# Patient Record
Sex: FEMALE | Race: WHITE | NOT HISPANIC OR LATINO | Employment: STUDENT | URBAN - METROPOLITAN AREA
[De-identification: names, ages, dates, MRNs, and addresses within clinical notes are randomized per-mention and may not be internally consistent; named-entity substitution may affect disease eponyms.]

---

## 2022-11-28 PROBLEM — D68.52 PROTHROMBIN GENE MUTATION (HCC): Status: ACTIVE | Noted: 2022-11-28

## 2023-01-31 PROBLEM — Z31.69 ENCOUNTER FOR PRECONCEPTION CONSULTATION: Status: ACTIVE | Noted: 2023-01-31

## 2023-01-31 NOTE — PROGRESS NOTES
PRECONCEPTION CONSULTATION: MATERNAL-FETAL MEDICINE      Assessment and Plan: Doug Murrell is a 33y o  year-old  here for preconception counseling due to a new diagnosis of prothrombin gene heterozygosity    Problem List Items Addressed This Visit        Other    Prothrombin gene mutation ([address] ) - Primary    Encounter for preconception consultation     1  Prothrombin gene mutation: This was diagnosed because her mother had blood clots recently diagnosed and was found to have low AT3 and was a carrier for prothrombin gene mutation  Mariela Karimi was tested for prothrombin and was found to be a heterozygous carrier  We reviewed that the prothrombin [id number] mutation results in elevated circulating prothrombin levels  This is present in 3% of  populations  A heterozygous carrier, such as Doug Murrell, has a <1% risk of VTE during pregnancy  Having a personal history of VTE would increase this risk to ~10%  With a history of an affected first degree relative (Maxwell's mom), this risk is only slightly increased from the baseline <1% risk in mutation carriers  A combination of this mutation with factor V Leiden could increase the risk even more  Though having both mutations is rare, we will test her for this and AT3 given her moms lab results  If it is positive, she would benefit from anticoagulation in a future pregnancy  If she is negative, then she would not require anticoagulation during a future pregnancy, however it would be recommended for 6 weeks postpartum given her first degree relative with a positive family history  While it can be considered during pregnancy, given the low risk and no additional risk factors, I would not strongly recommend this  If she developed additional risk factors during a pregnancy for VTE (prolonged hospitalization, surgery, preeclampsia, or other), then I would initiate antepartum anticoagulation   Postpartum she should be on prophylactic lovenox 40 mg once a day for 6 weeks     2  General Preconception Counseling: We reviewed the typical course of prenatal care and opportunities to improve health prior to pregnancy including tobacco and marijuana cessation, a healthy diet and exercise  We discussed micronutrients that will improve nutritional stores during pregnancy (including calcium supplementation 7469 mg daily, folic acid 631 mcg daily, choline 450 mg daily, and omega-3 fatty acids (DHA & EPA)) 1000 mg daily, as well as the importance of prenatal vitamins     - A healthy diet and exercise were encouraged as the goal is a healthy weight before pregnancy  We reviewed that for most women this would be ~20 min of moderate activity everyday  - Laboratory testing can be completed prior to pregnancy to evaluation for immunity status (rubella and varicella) and immunizations updated prior to pregnancy  - Carrier Screening: We briefly discussed carrier screening  If not already offered through your office, we would be happy to see her back for carrier screening discussion and testing as desired  3  Adderall use: We reviewed that a lot of data that was concerning for pregnancy was based off of using more dextroamphetamine than prescribed  Using prescribed amounts has not been as well studied  When used for medical reasons it overall does not appear to increase the risk of birth defects  Some older studies are small effect on birthweight amongst people continue to use the past 28 weeks but this was to control weight gain and other studies looking at it to treat ADD as she has not found it effective  Misuse of amphetamines or using more than prescribed is associated with poor growth low birthweight and  delivery  However the studies may have had other risk factors  There can be symptoms of withdrawal in babies after exposure to amphetamines however it is not known if this extends to prescribed amounts of dextroamphetamine    There are no studies looking at long-term behavior  If she is able to be well controlled off of this medication this would be another medication that I would generally recommend discontinuing given potential risks  References: PMID: [id number] PMID: [id number]  It is her intention to discontinue this medication in the case of future pregnancy  Thank you for referring this patient for preconception consultation  Please do not hesitate to reach out with any additional questions or concerns  Unique Bocanegra MD  Attending Physician, Prema      Referring physician:   Monica Gallego, [address],  [address] [facility]    Reason for consultation: No chief complaint on file  Dear Dr Tate Nguyen,    Thank you for referring patient Nilda Hui for preconception Maternal-Fetal Medicine consultation regarding prothrombin gene heterozygosity  As you know, Ms Moon Dubon is a 33y o  year-old   Her history is as follows:    Past Medical History:   Diagnosis Date   • ADHD    • Asthma    • Fibroadenoma of breast, left    • Prothrombin gene mutation ([address] )        Past Surgical History:   Procedure Laterality Date   • WISDOM TOOTH EXTRACTION         OB History    Para Term  AB Living   0 0 0 0 0 0   SAB IAB Ectopic Multiple Live Births   0 0 0 0 0   Obstetric Comments   Menarche age 9   bcp-8yrs       Gynecologic history: No LMP recorded        Social History     Tobacco Use   • Smoking status: Never   • Smokeless tobacco: Never   Vaping Use   • Vaping Use: Never used   Substance Use Topics   • Alcohol use: Yes     Comment: occasional   • Drug use: Never       Family History   Problem Relation Age of Onset   • Thyroid disease Mother    • Breast cancer Other 61       Family history per our office screening tool includes mother with DVTs but is negative for Ashkenazi Methodist ancestry, birth defects, diabetes, early-onset breast ovarian or colon cancer; Down syndrome or other chromosome problem, genetic condition or carrier state for cystic fibrosis, sickle cell, thalassemia, Raphael-Sachs, or spinal muscular atrophy; hemophilia or von Willebrand's disease; preeclampsia or hypertension, or opiate use disorder/addiction or overdose  Current Outpatient Medications:   •  amphetamine-dextroamphetamine (ADDERALL XR) 25 MG 24 hr capsule, Take 1 capsule (25 mg total) by mouth every morning Max Daily Amount: 25 mg, Disp: 30 capsule, Rfl: 0    Allergies   Allergen Reactions   • Shellfish-Derived Products - Food Allergy Swelling       Occupation: She works as an RN   Spouse/Partner Name: Adelia Magana; Age 34; occupation: [employer]  Review of Systems   Constitutional: Negative for chills, fever and unexpected weight change  HENT: Negative for congestion, dental problem, facial swelling and sore throat  Eyes: Negative for visual disturbance  Respiratory: Negative for cough and shortness of breath  Cardiovascular: Negative for chest pain and palpitations  Gastrointestinal: Negative for diarrhea and vomiting  Endocrine: Negative for polydipsia  Genitourinary: Negative for dysuria and vaginal bleeding  Musculoskeletal: Negative for back pain and joint swelling  Skin: Negative for rash and wound  Allergic/Immunologic: Negative for immunocompromised state  Neurological: Negative for seizures and headaches  Hematological: Does not bruise/bleed easily  Psychiatric/Behavioral: Negative for hallucinations and suicidal ideas  Vitals: not currently breastfeeding  Physical Exam  Constitutional:       General: She is not in acute distress  Appearance: Normal appearance  She is not ill-appearing, toxic-appearing or diaphoretic  HENT:      Head: Normocephalic and atraumatic  Nose: No congestion or rhinorrhea  Eyes:      General: No scleral icterus  Right eye: No discharge  Left eye: No discharge        Extraocular Movements: Extraocular movements intact  Conjunctiva/sclera: Conjunctivae normal    Pulmonary:      Effort: Pulmonary effort is normal  No respiratory distress  Musculoskeletal:      Cervical back: Normal range of motion  Skin:     Coloration: Skin is not jaundiced or pale  Findings: No erythema, lesion or rash  Neurological:      General: No focal deficit present  Mental Status: She is alert and oriented to person, place, and time  Psychiatric:         Mood and Affect: Mood normal          Behavior: Behavior normal         -------------------------    The total time spent on this new patient on the encounter date was 45 minutes  This time included previsit service time of  15 minutes reviewing records and precharting, face-to-face (via virtual platform) service time of  20 minutes counseling regarding results and coordinating care, and post-service time of  10 minutes charting  At the conclusion of today's encounter, all questions were answered to her satisfaction  Thank you very much for this kind referral and please do not hesitate to contact me with any further questions or concerns      Sincerely,    Lauren Perry MD  Attending Physician, Pre[facility]    I spent 20 minutes directly with the patient during this visit

## 2023-02-03 ENCOUNTER — CONSULT (OUTPATIENT)
Dept: PERINATAL CARE | Facility: CLINIC | Age: 28
End: 2023-02-03

## 2023-02-03 VITALS
WEIGHT: 167.8 LBS | HEART RATE: 81 BPM | DIASTOLIC BLOOD PRESSURE: 60 MMHG | BODY MASS INDEX: 26.34 KG/M2 | HEIGHT: 67 IN | SYSTOLIC BLOOD PRESSURE: 120 MMHG

## 2023-02-03 DIAGNOSIS — Z82.49 FAMILY HISTORY OF THROMBOSIS IN FIRST DEGREE RELATIVE: ICD-10-CM

## 2023-02-03 DIAGNOSIS — Z31.69 ENCOUNTER FOR PRECONCEPTION CONSULTATION: ICD-10-CM

## 2023-02-03 DIAGNOSIS — D68.52 PROTHROMBIN GENE MUTATION (HCC): Primary | ICD-10-CM

## 2023-02-06 ENCOUNTER — APPOINTMENT (OUTPATIENT)
Dept: LAB | Facility: CLINIC | Age: 28
End: 2023-02-06

## 2023-02-06 DIAGNOSIS — D68.52 PROTHROMBIN GENE MUTATION (HCC): ICD-10-CM

## 2023-02-06 DIAGNOSIS — Z82.49 FAMILY HISTORY OF THROMBOSIS IN FIRST DEGREE RELATIVE: ICD-10-CM

## 2023-02-07 LAB — DEPRECATED AT III PPP: 93 % OF NORMAL (ref 92–136)

## 2023-02-10 LAB — F5 GENE MUT ANL BLD/T: NORMAL

## 2023-02-20 DIAGNOSIS — F90.1 ATTENTION DEFICIT HYPERACTIVITY DISORDER (ADHD), PREDOMINANTLY HYPERACTIVE TYPE: ICD-10-CM

## 2023-02-21 RX ORDER — DEXTROAMPHETAMINE SACCHARATE, AMPHETAMINE ASPARTATE MONOHYDRATE, DEXTROAMPHETAMINE SULFATE AND AMPHETAMINE SULFATE 6.25; 6.25; 6.25; 6.25 MG/1; MG/1; MG/1; MG/1
25 CAPSULE, EXTENDED RELEASE ORAL EVERY MORNING
Qty: 30 CAPSULE | Refills: 0 | Status: SHIPPED | OUTPATIENT
Start: 2023-02-21

## 2023-02-27 ENCOUNTER — TELEPHONE (OUTPATIENT)
Facility: HOSPITAL | Age: 28
End: 2023-02-27

## 2023-02-27 NOTE — TELEPHONE ENCOUNTER
PT called office requesting to discuss blood work she received from her mother that was positive  She states she wants to reach out and discuss this with Dr Jak Almendarez  Pt informed that I would send a message to her and Dr Jak Almendarez will call her to discuss  PTs phone number in Epic confirmed  PT receptive to information and declines questions at this time

## 2023-02-27 NOTE — TELEPHONE ENCOUNTER
I spoke with Ena Borden about her moms recent + test for lupus anticoagulant  Due to this result her mom got diagnosed with antiphospholipid antibody syndrome (APLS) in addition to her diagnosis of prothrombin gene mutation  We reviewed that APLS is an acquired and not an inherited thrombophilia  Since she has no personal history of blood clots and no other clinical history that is seen with APLS I would not recommend additional thrombophilia workup at this time  Ena Borden has known + Prothrombin gene heterozygosity  Ena Borden had directed testing based on her moms genetic mutation  I added Factor V Leiden testing as these two mutations together though rare can change pregnancy management, as well as AT3 since her mom had borderline values in her results  At this point I do not recommend additional work up as per my full preconception consult note    Unique Bocanegra MD  Attending Physician, Prema

## 2023-03-09 ENCOUNTER — TELEPHONE (OUTPATIENT)
Dept: FAMILY MEDICINE CLINIC | Facility: CLINIC | Age: 28
End: 2023-03-09

## 2023-03-15 ENCOUNTER — TELEPHONE (OUTPATIENT)
Dept: OBGYN CLINIC | Facility: MEDICAL CENTER | Age: 28
End: 2023-03-15

## 2023-03-15 DIAGNOSIS — N92.6 MISSED MENSES: Primary | ICD-10-CM

## 2023-03-19 ENCOUNTER — NURSE TRIAGE (OUTPATIENT)
Dept: OTHER | Facility: OTHER | Age: 28
End: 2023-03-19

## 2023-03-19 ENCOUNTER — APPOINTMENT (EMERGENCY)
Dept: ULTRASOUND IMAGING | Facility: HOSPITAL | Age: 28
End: 2023-03-19

## 2023-03-19 ENCOUNTER — HOSPITAL ENCOUNTER (EMERGENCY)
Facility: HOSPITAL | Age: 28
Discharge: HOME | End: 2023-03-20
Attending: EMERGENCY MEDICINE

## 2023-03-19 VITALS
HEART RATE: 81 BPM | SYSTOLIC BLOOD PRESSURE: 115 MMHG | RESPIRATION RATE: 18 BRPM | OXYGEN SATURATION: 100 % | BODY MASS INDEX: 27.1 KG/M2 | TEMPERATURE: 98.3 F | WEIGHT: 171.74 LBS | DIASTOLIC BLOOD PRESSURE: 69 MMHG

## 2023-03-19 DIAGNOSIS — O20.0 THREATENED MISCARRIAGE IN EARLY PREGNANCY: Primary | ICD-10-CM

## 2023-03-19 LAB
ABO GROUP BLD: NORMAL
ANION GAP SERPL CALCULATED.3IONS-SCNC: 7 MMOL/L (ref 4–13)
B-HCG SERPL-ACNC: 14 MIU/ML (ref 0–11.6)
BACTERIA UR QL AUTO: ABNORMAL /HPF
BASOPHILS # BLD AUTO: 0.03 THOUSANDS/ÂΜL (ref 0–0.1)
BASOPHILS NFR BLD AUTO: 0 % (ref 0–1)
BILIRUB UR QL STRIP: NEGATIVE
BUN SERPL-MCNC: 17 MG/DL (ref 5–25)
CALCIUM SERPL-MCNC: 9.2 MG/DL (ref 8.4–10.2)
CHLORIDE SERPL-SCNC: 104 MMOL/L (ref 96–108)
CLARITY UR: CLEAR
CO2 SERPL-SCNC: 26 MMOL/L (ref 21–32)
COLOR UR: YELLOW
CREAT SERPL-MCNC: 0.82 MG/DL (ref 0.6–1.3)
EOSINOPHIL # BLD AUTO: 0.05 THOUSAND/ÂΜL (ref 0–0.61)
EOSINOPHIL NFR BLD AUTO: 0 % (ref 0–6)
ERYTHROCYTE [DISTWIDTH] IN BLOOD BY AUTOMATED COUNT: 12.8 % (ref 11.6–15.1)
EXT PREGNANCY TEST URINE: NEGATIVE
EXT. CONTROL: NORMAL
GFR SERPL CREATININE-BSD FRML MDRD: 99 ML/MIN/1.73SQ M
GLUCOSE SERPL-MCNC: 96 MG/DL (ref 65–140)
GLUCOSE UR STRIP-MCNC: NEGATIVE MG/DL
HCT VFR BLD AUTO: 41.4 % (ref 34.8–46.1)
HGB BLD-MCNC: 13.6 G/DL (ref 11.5–15.4)
HGB UR QL STRIP.AUTO: ABNORMAL
IMM GRANULOCYTES # BLD AUTO: 0.04 THOUSAND/UL (ref 0–0.2)
IMM GRANULOCYTES NFR BLD AUTO: 0 % (ref 0–2)
KETONES UR STRIP-MCNC: NEGATIVE MG/DL
LEUKOCYTE ESTERASE UR QL STRIP: NEGATIVE
LYMPHOCYTES # BLD AUTO: 1.82 THOUSANDS/ÂΜL (ref 0.6–4.47)
LYMPHOCYTES NFR BLD AUTO: 14 % (ref 14–44)
MCH RBC QN AUTO: 29.2 PG (ref 26.8–34.3)
MCHC RBC AUTO-ENTMCNC: 32.9 G/DL (ref 31.4–37.4)
MCV RBC AUTO: 89 FL (ref 82–98)
MONOCYTES # BLD AUTO: 0.62 THOUSAND/ÂΜL (ref 0.17–1.22)
MONOCYTES NFR BLD AUTO: 5 % (ref 4–12)
NEUTROPHILS # BLD AUTO: 10.12 THOUSANDS/ÂΜL (ref 1.85–7.62)
NEUTS SEG NFR BLD AUTO: 81 % (ref 43–75)
NITRITE UR QL STRIP: NEGATIVE
NON-SQ EPI CELLS URNS QL MICRO: ABNORMAL /HPF
NRBC BLD AUTO-RTO: 0 /100 WBCS
PH UR STRIP.AUTO: 5.5 [PH] (ref 4.5–8)
PLATELET # BLD AUTO: 257 THOUSANDS/UL (ref 149–390)
PMV BLD AUTO: 8.7 FL (ref 8.9–12.7)
POTASSIUM SERPL-SCNC: 3.8 MMOL/L (ref 3.5–5.3)
PROT UR STRIP-MCNC: ABNORMAL MG/DL
RBC # BLD AUTO: 4.66 MILLION/UL (ref 3.81–5.12)
RBC #/AREA URNS AUTO: ABNORMAL /HPF
RH BLD: POSITIVE
SODIUM SERPL-SCNC: 137 MMOL/L (ref 135–147)
SP GR UR STRIP.AUTO: >=1.03 (ref 1–1.03)
UROBILINOGEN UR QL STRIP.AUTO: 0.2 E.U./DL
WBC # BLD AUTO: 12.68 THOUSAND/UL (ref 4.31–10.16)
WBC #/AREA URNS AUTO: ABNORMAL /HPF

## 2023-03-19 NOTE — TELEPHONE ENCOUNTER
Regardin Weeks Pregnant, Now Moderate Bleeding with Clots, Cramping  ----- Message from Armida Johnson sent at 3/19/2023  5:33 PM EDT -----  " I am 6 weeks Pregnant, I called earlier and was told to call back if symptoms got worse  My bleeding got heavier, It's now moderate with some clots   The blood is mixed with my urine stream  I am having some Cramps along with it  "

## 2023-03-19 NOTE — TELEPHONE ENCOUNTER
Reason for Disposition  • SPOTTING (single or brief episode)    Answer Assessment - Initial Assessment Questions  1  ONSET: "When did this bleeding start?"        today  2  DESCRIPTION: "Describe the bleeding that you are having " "How much bleeding is there?"     - SPOTTING: spotting, or pinkish / brownish mucous discharge; does not fill panti-liner or pad     - MILD:  less than 1 pad / hour; less than patient's usual menstrual bleeding    - MODERATE: 1-2 pads / hour; 1 menstrual cup every 6 hours; small-medium blood clots (e g , pea, grape, small coin)    - SEVERE: soaking 2 or more pads/hour for 2 or more hours; 1 menstrual cup every 2 hours; bleeding not contained by pads or continuous red blood from vagina; large blood clots (e g , golf ball, large coin)       Spotting does not need a pad   3  ABDOMINAL PAIN SEVERITY: If present, ask: "How bad is it?"  (e g , Scale 1-10; mild, moderate, or severe)    - MILD (1-3): doesn't interfere with normal activities, abdomen soft and not tender to touch     - MODERATE (4-7): interferes with normal activities or awakens from sleep, tender to touch     - SEVERE (8-10): excruciating pain, doubled over, unable to do any normal activities      3/10   4   PREGNANCY: "Do you know how many weeks or months pregnant you are?" "When was the first day of your last normal menstrual period?"      Unsure last period 2/7    Protocols used: PREGNANCY - VAGINAL BLEEDING LESS THAN 20 WEEKS EGA-ADULT-AH

## 2023-03-19 NOTE — TELEPHONE ENCOUNTER
Reason for Disposition  • MILD vaginal bleeding (i e , less than 1 pad / hour; less than patient's usual menstrual bleeding; not just spotting)    Answer Assessment - Initial Assessment Questions  1  ONSET: "When did this bleeding start?"        Today this morning  2  DESCRIPTION: "Describe the bleeding that you are having " "How much bleeding is there?"     - SPOTTING: spotting, or pinkish / brownish mucous discharge; does not fill panti-liner or pad     - MILD:  less than 1 pad / hour; less than patient's usual menstrual bleeding    - MODERATE: 1-2 pads / hour; 1 menstrual cup every 6 hours; small-medium blood clots (e g , pea, grape, small coin)    - SEVERE: soaking 2 or more pads/hour for 2 or more hours; 1 menstrual cup every 2 hours; bleeding not contained by pads or continuous red blood from vagina; large blood clots (e g , golf ball, large coin)       Mild- noticed some red blood in the toilet  Some small clots  Just started wearing a pad now- is not soaking through pad  Started with spotting earlier today  3  ABDOMINAL PAIN SEVERITY: If present, ask: "How bad is it?"  (e g , Scale 1-10; mild, moderate, or severe)    - MILD (1-3): doesn't interfere with normal activities, abdomen soft and not tender to touch     - MODERATE (4-7): interferes with normal activities or awakens from sleep, tender to touch     - SEVERE (8-10): excruciating pain, doubled over, unable to do any normal activities      3/10- feels period cramp    4  PREGNANCY: "Do you know how many weeks or months pregnant you are?" "When was the first day of your last normal menstrual period?"      Estimated to be about 6 weeks gestation  2/7 was last menstrual period  5  HEMODYNAMIC STATUS: "Are you weak or feeling lightheaded?" If Yes, ask: "Can you stand and walk normally?"       No lightheaded or dizziness       6  OTHER SYMPTOMS: "What other symptoms are you having with the bleeding?" (e g , passed tissue, vaginal discharge, fever, menstrual-type cramps)      No tissue like substance  NO fever  No other symptoms      Protocols used: PREGNANCY - VAGINAL BLEEDING LESS THAN 20 WEEKS EGA-ADULT-AH

## 2023-03-19 NOTE — TELEPHONE ENCOUNTER
Regarding: Light bleeding/Cramps  ----- Message from Ant Gallegos sent at 3/19/2023  4:21 PM EDT -----  "I have light bleeding with some cramps"

## 2023-03-19 NOTE — Clinical Note
Joel Watts was seen and treated in our emergency department on 3/19/2023  Diagnosis:     Tierra Loev  may return to work on return date  She may return on this date: 03/22/2023         If you have any questions or concerns, please don't hesitate to call        Elvia Hua MD    ______________________________           _______________          _______________  Hospital Representative                              Date                                Time

## 2023-03-20 NOTE — ED NOTES
Patient returned from [address] [place] [address] [address]       Marylou Conde RN  03/19/23 [id number]

## 2023-03-20 NOTE — ED PROVIDER NOTES
History  Chief Complaint   Patient presents with   • Vaginal Bleeding     Pt c/o vaginal bleeding and cramping that has gotten  worse during the day  Pt states she is x 6 weeks pregnant  Pt denies cp/sob/n/v/d or fevers     25-year-old female presented for evaluation of pelvic pain and vaginal bleeding  She reports that she estimates she is about 6 weeks pregnant based on her last menstrual cycle  She first had a positive pregnancy test about 2 weeks ago  She has been in contact with an OB/GYN but has not seen them in the office yet and has not yet had an ultrasound  Starting last evening she began to have some vaginal spotting that was relatively mild  Starting this morning she had heavier bleeding associated with abdominal/pelvic cramping similar to menstrual cycle  Throughout the day her symptoms worsened, the pain became more intense, and bleeding became heavier  This evening while she was in the shower she noted very heavy bleeding and passage of clots  She denies any chest pain, shortness of breath, presyncopal symptoms  History provided by:  Patient   used: No    Vaginal Bleeding  Quality:  Dark red, spotting, heavier than menses and clots  Severity:  Moderate  Onset quality:  Gradual  Duration:  1 day  Timing:  Constant  Progression:  Improving  Chronicity:  New  Menstrual history:  Regular and missed period  Possible pregnancy: yes    Relieved by:  Nothing  Worsened by:  Nothing  Ineffective treatments:  None tried  Associated symptoms: abdominal pain    Associated symptoms: no fatigue, no fever and no nausea        Prior to Admission Medications   Prescriptions Last Dose Informant Patient Reported? Taking?    amphetamine-dextroamphetamine (ADDERALL XR) 25 MG 24 hr capsule   No No   Sig: Take 1 capsule (25 mg total) by mouth every morning Max Daily Amount: 25 mg      Facility-Administered Medications: None       Past Medical History:   Diagnosis Date   • ADHD    • Asthma    • Fibroadenoma of breast, left    • Prothrombin gene mutation [facility])        Past Surgical History:   Procedure Laterality Date   • WISDOM TOOTH EXTRACTION         Family History   Problem Relation Age of Onset   • Thyroid disease Mother    • Breast cancer Other 61     I have reviewed and agree with the history as documented  E-Cigarette/Vaping   • E-Cigarette Use Never User      E-Cigarette/Vaping Substances   • Nicotine No    • THC No    • CBD No    • Flavoring No      Social History     Tobacco Use   • Smoking status: Never   • Smokeless tobacco: Never   Vaping Use   • Vaping Use: Never used   Substance Use Topics   • Alcohol use: Not Currently     Comment: occasional   • Drug use: Never       Review of Systems   Constitutional: Negative for fatigue and fever  Respiratory: Negative for chest tightness and shortness of breath  Gastrointestinal: Positive for abdominal pain  Negative for nausea and vomiting  Genitourinary: Positive for pelvic pain and vaginal bleeding  Neurological: Negative for syncope, weakness and numbness  Physical Exam  Physical Exam  Vitals and nursing note reviewed  Constitutional:       General: She is not in acute distress  Appearance: She is well-developed  HENT:      Head: Normocephalic and atraumatic  Mouth/Throat:      Mouth: Mucous membranes are moist       Pharynx: Oropharynx is clear  Eyes:      Conjunctiva/sclera: Conjunctivae normal    Cardiovascular:      Rate and Rhythm: Normal rate and regular rhythm  Heart sounds: No murmur heard  Pulmonary:      Effort: Pulmonary effort is normal  No respiratory distress  Breath sounds: Normal breath sounds  Abdominal:      Palpations: Abdomen is soft  Tenderness: There is no abdominal tenderness  Comments: Could not identify IUP on bedside transabdominal US  Musculoskeletal:         General: No swelling  Cervical back: Neck supple  Skin:     General: Skin is warm and dry  Capillary Refill: Capillary refill takes less than 2 seconds  Neurological:      General: No focal deficit present  Mental Status: She is alert and oriented to person, place, and time  Motor: No weakness  Gait: Gait normal    Psychiatric:         Mood and Affect: Mood normal          Vital Signs  ED Triage Vitals [03/19/23 2032]   Temperature Pulse Respirations Blood Pressure SpO2   98 3 °F (36 8 °C) 83 18 130/68 100 %      Temp Source Heart Rate Source Patient Position - Orthostatic VS BP Location FiO2 (%)   Oral Monitor Sitting Right arm --      Pain Score       6           Vitals:    03/19/23 2032 03/19/23 2304   BP: 130/68 115/69   Pulse: 83 81   Patient Position - Orthostatic VS: Sitting Lying         Visual Acuity      ED Medications  Medications - No data to display    Diagnostic Studies  Results Reviewed     Procedure Component Value Units Date/Time    Urine culture [[id number]] Collected: 03/19/23 2134    Lab Status:  In process Specimen: Urine, Clean Catch Updated: 03/19/23 2223    hCG, quantitative [[id number]]  (Abnormal) Collected: 03/19/23 2132    Lab Status: Final result Specimen: Blood from Arm, Left Updated: 03/19/23 2206     HCG, Quant 14 mIU/mL     Narrative:       Expected Ranges:     Approximate               Approximate HCG  Gestation age          Concentration ( mIU/mL)  _____________          ______________________   Aric Nurse                      HCG values  0 2-1                       5-50  1-2                           2-3                         100-5000  3-4                         500-58273  4-5                         1000-72839  5-6                         [id number]  6-8                         [id number]  8-12                        [id number]      Urine Microscopic [[id number]]  (Abnormal) Collected: 03/19/23 2134    Lab Status: Final result Specimen: Urine, Clean Catch Updated: 03/19/23 2203     RBC, UA Innumerable /hpf      WBC, UA 4-10 /hpf      Epithelial Cells None Seen /hpf      Bacteria, UA Occasional /hpf     Basic metabolic panel [[id number]] Collected: 03/19/23 2132    Lab Status: Final result Specimen: Blood from Arm, Left Updated: 03/19/23 2157     Sodium 137 mmol/L      Potassium 3 8 mmol/L      Chloride 104 mmol/L      CO2 26 mmol/L      ANION GAP 7 mmol/L      BUN 17 mg/dL      Creatinine 0 82 mg/dL      Glucose 96 mg/dL      Calcium 9 2 mg/dL      eGFR 99 ml/min/1 73sq m     Narrative:      [place] guidelines for Chronic Kidney Disease (CKD):   •  Stage 1 with normal or high GFR (GFR > 90 mL/min/1 73 square meters)  •  Stage 2 Mild CKD (GFR = 60-89 mL/min/1 73 square meters)  •  Stage 3A Moderate CKD (GFR = 45-59 mL/min/1 73 square meters)  •  Stage 3B Moderate CKD (GFR = 30-44 mL/min/1 73 square meters)  •  Stage 4 Severe CKD (GFR = 15-29 mL/min/1 73 square meters)  •  Stage 5 End Stage CKD (GFR <15 mL/min/1 73 square meters)  Note: GFR calculation is accurate only with a steady state creatinine    CBC and differential [[id number]]  (Abnormal) Collected: 03/19/23 2132    Lab Status: Final result Specimen: Blood from Arm, Left Updated: 03/19/23 2139     WBC 12 68 Thousand/uL      RBC 4 66 Million/uL      Hemoglobin 13 6 g/dL      Hematocrit 41 4 %      MCV 89 fL      MCH 29 2 pg      MCHC 32 9 g/dL      RDW 12 8 %      MPV 8 7 fL      Platelets 213 Thousands/uL      nRBC 0 /100 WBCs      Neutrophils Relative 81 %      Immat GRANS % 0 %      Lymphocytes Relative 14 %      Monocytes Relative 5 %      Eosinophils Relative 0 %      Basophils Relative 0 %      Neutrophils Absolute 10 12 Thousands/µL      Immature Grans Absolute 0 04 Thousand/uL      Lymphocytes Absolute 1 82 Thousands/µL      Monocytes Absolute 0 62 Thousand/µL      Eosinophils Absolute 0 05 Thousand/µL      Basophils Absolute 0 03 Thousands/µL     POCT pregnancy, urine [[id number]]  (Normal) Resulted: 03/19/23 2136    Lab Status: Final result Updated: 03/19/23 2136 EXT Preg Test, Ur Negative     Control Valid    Urine Macroscopic, POC [[id number]]  (Abnormal) Collected: 23    Lab Status: Final result Specimen: Urine Updated: 23     Color, UA Yellow     Clarity, UA Clear     pH, UA 5 5     Leukocytes, UA Negative     Nitrite, UA Negative     Protein, UA Trace mg/dl      Glucose, UA Negative mg/dl      Ketones, UA Negative mg/dl      Urobilinogen, UA 0 2 E U /dl      Bilirubin, UA Negative     Occult Blood, UA Large     Specific Gravity, UA >=1 030    Narrative:      CLINITEK RESULT    POCT urinalysis dipstick [[id number]]     Lab Status: No result Specimen: Urine                  US OB pregnancy limited with transvaginal   Final Result by Thao Harrell DO (40)      No intrauterine gestation is identified  Differential considerations remain early IUP, spontaneous  or ectopic pregnancy  Clinical correlation and correlation with serial quantitative BHCG measurements recommended  Probable right ovarian corpus luteum; bilateral ovaries otherwise appear grossly unremarkable  Other findings as above  Workstation performed: [id number]                    Procedures  Procedures         ED Course                               SBIRT 22yo+    Flowsheet Row Most Recent Value   SBIRT (23 yo +)    In order to provide better care to our patients, we are screening all of our patients for alcohol and drug use  Would it be okay to ask you these screening questions? No Filed at: 2023                    Medical Decision Making  81-year-old female presented for pelvic pain and vaginal bleeding in first trimester pregnancy  She reports having positive pregnancy test at home about 2 weeks ago  Unfortunately today her serum quantitative hCG was only 14  She described rather heavy bleeding with passage of clots earlier today  Both bedside informal ultrasound were unable to identify an IUP    Discussed with patient differential diagnosis still includes early pregnancy, ectopic pregnancy, or miscarriage  Unfortunately, given the report of a positive urine test 2 weeks ago and now negative urine test with hCG of only 14, miscarriage is favored at this point  Still we stressed the importance of prompt and thorough follow-up with OB/GYN  Patient is to undergo repeat hCG in about 2 days  We discussed return precautions for new or worsening symptoms  Threatened miscarriage in early pregnancy: acute illness or injury  Amount and/or Complexity of Data Reviewed  Labs: ordered  Decision-making details documented in ED Course  Radiology: ordered  Decision-making details documented in ED Course  Disposition  Final diagnoses:   Threatened miscarriage in early pregnancy     Time reflects when diagnosis was documented in both MDM as applicable and the Disposition within this note     Time User Action Codes Description Comment    3/20/2023 12:22 AM Mary Newton Add [O20 0] Threatened miscarriage in early pregnancy       ED Disposition     ED Disposition   Discharge    Condition   Stable    Date/Time   Mon Mar 20, 2023 12:22 AM    Comment   Maxwell Crabtree discharge to home/self care                 Follow-up Information     Follow up With Specialties Details Why Contact Info Additional Information    Maribel Portillo MD [facility] [address]  [address] [facility]   [address] [facility] [address]  [address] [facility]  [address], [address], [address] [phone]          Discharge Medication List as of 3/20/2023 12:25 AM      CONTINUE these medications which have NOT CHANGED    Details   amphetamine-dextroamphetamine (ADDERALL XR) 25 MG 24 hr capsule Take 1 capsule (25 mg total) by mouth every morning Max Daily Amount: 25 mg, Starting Tue 2/21/2023, Normal             No discharge procedures on file      PDMP Review       Value Time User    PDMP Reviewed  Yes 2/21/2023 10:21 AM Yimi Rasmussen MD          ED Provider  Electronically Signed by           Onel Bailon MD  03/20/23 1421

## 2023-03-20 NOTE — ED NOTES
Patient transported to [address] [place] [address],[address]       Maria Del Carmen Pierre RN  03/19/23 0737

## 2023-03-20 NOTE — DISCHARGE INSTRUCTIONS
We were not able to identify intrauterine pregnancy on your ultrasound today  Your serum hCG level was 14  Possible diagnoses still include early pregnancy, ectopic pregnancy, or miscarriage  You should speak to your OB/GYN tomorrow and arrange for repeat hCG blood test in about 2 days  If you have any new or worsening symptoms, please call your doctor or return to the ED for reevaluation

## 2023-03-21 LAB — BACTERIA UR CULT: NORMAL

## 2023-03-22 NOTE — PROGRESS NOTES
[facility] [address] [place] [address]    Assessment/Plan:  No problem-specific Assessment & Plan notes found for this encounter  Diagnoses and all orders for this visit:    Miscarriage, threatened, early pregnancy  -     hCG, quantitative; Future    Prothrombin gene mutation ([address] )        - see MFM consult    Other orders  -     Prenatal Vit w/Aa-Rgqiqqqwh-NJ (PNV PO); Take 1 tablet by mouth in the morning    - Planned expectant management, due to starting HCG of 14  Will repeat BHCG until negative  - to continue on PNV daily  - can try to conceive when cycle returns to normal  - will contact with next HCG level, Maxwell would like to repeat lab today  Subjective:   Binta Sauer is a 33 y o   female  CC: follow-up threatened miscarriage    HPI:   Nadia Dorado and her  are here to follow-up with probable miscarriage  Bleeding started 3/19/23,vaginal spotting that was relatively mild  3/19/23 she had heavier bleeding associated with abdominal/pelvic cramping similar to menstrual cycle  Throughout the day her symptoms worsened, the pain became more intense, and bleeding became heavier  Evening of 3/19/23, she was in the shower she noted very heavy bleeding and passage of clots  She still feels bloated and notes breast tenderness  HCG level was 14 on 3/19/23  Would like to try to get pregnant again in the near future  We reviewed recent lab results post visit with MFM  Nadia Dorado has known + Prothrombin gene heterozygosity  She is handling emotions well  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  She is taking PNV  Notes healthy diet  Planned expectant management  Will repeat BHCG until negative  Most common causes of miscarriage were discussed  ROS: Review of Systems   Constitutional: Negative for chills, fatigue and fever  Genitourinary: Positive for menstrual problem   Negative for difficulty urinating, frequency, urgency, vaginal bleeding, vaginal discharge and vaginal pain  PFSH: The following portions of the patient's history were reviewed and updated as appropriate: allergies, current medications, past family history, past medical history, obstetric history, gynecologic history, past social history, past surgical history and problem list        Objective:  /62   Ht 5' 6 75" (1 695 m)   Wt 77 7 kg (171 lb 4 8 oz)   LMP 02/07/2023 (Exact Date)   Breastfeeding Unknown   BMI 27 03 kg/m²    Physical Exam  Vitals reviewed  Cardiovascular:      Rate and Rhythm: Normal rate  Pulmonary:      Effort: Pulmonary effort is normal    Neurological:      Mental Status: She is alert and oriented to person, place, and time     Psychiatric:         Mood and Affect: Mood normal          Behavior: Behavior normal

## 2023-03-23 ENCOUNTER — APPOINTMENT (OUTPATIENT)
Dept: LAB | Facility: MEDICAL CENTER | Age: 28
End: 2023-03-23

## 2023-03-23 ENCOUNTER — ROUTINE PRENATAL (OUTPATIENT)
Dept: OBGYN CLINIC | Facility: MEDICAL CENTER | Age: 28
End: 2023-03-23

## 2023-03-23 VITALS
SYSTOLIC BLOOD PRESSURE: 116 MMHG | WEIGHT: 171.3 LBS | HEIGHT: 67 IN | BODY MASS INDEX: 26.89 KG/M2 | DIASTOLIC BLOOD PRESSURE: 62 MMHG

## 2023-03-23 DIAGNOSIS — O20.0 MISCARRIAGE, THREATENED, EARLY PREGNANCY: ICD-10-CM

## 2023-03-23 DIAGNOSIS — O20.0 MISCARRIAGE, THREATENED, EARLY PREGNANCY: Primary | ICD-10-CM

## 2023-03-23 DIAGNOSIS — D68.52 PROTHROMBIN GENE MUTATION (HCC): ICD-10-CM

## 2023-03-23 LAB — B-HCG SERPL-ACNC: <2 MIU/ML

## 2023-04-03 ENCOUNTER — OFFICE VISIT (OUTPATIENT)
Dept: FAMILY MEDICINE CLINIC | Facility: CLINIC | Age: 28
End: 2023-04-03

## 2023-04-03 VITALS
WEIGHT: 175 LBS | OXYGEN SATURATION: 99 % | DIASTOLIC BLOOD PRESSURE: 68 MMHG | BODY MASS INDEX: 27.47 KG/M2 | HEART RATE: 73 BPM | TEMPERATURE: 97.4 F | HEIGHT: 67 IN | SYSTOLIC BLOOD PRESSURE: 122 MMHG

## 2023-04-03 DIAGNOSIS — D68.52 PROTHROMBIN GENE MUTATION (HCC): ICD-10-CM

## 2023-04-03 DIAGNOSIS — F90.0 ATTENTION DEFICIT HYPERACTIVITY DISORDER (ADHD), PREDOMINANTLY INATTENTIVE TYPE: Primary | ICD-10-CM

## 2023-04-03 NOTE — PATIENT INSTRUCTIONS
Doing well overall considering  Recent miscarriage but optimistic for the next time  At the present time, prefers to stay off Adderall although there is low risk of abnormality of pregnancy when on Adderall  Fetal medicine specialist consultation reviewed in regard to her prothrombin gene disorder  Return as needed

## 2023-04-03 NOTE — PROGRESS NOTES
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  Work was a little harder without her Adderall  Energy levels lower without the Adderall  Presently doing 6 nightshifts in a row  Then 8 days off  Going to [employer] for 11 days  Past Medical History:   Diagnosis Date   • ADHD    • Asthma    • Fibroadenoma of breast, left    • Prothrombin gene mutation [facility])         Past Surgical History:   Procedure Laterality Date   • WISDOM TOOTH EXTRACTION         Social History     Tobacco Use   • Smoking status: Never   • Smokeless tobacco: Never   Substance Use Topics   • Alcohol use: Not Currently     Comment: occasional       Social History     Social History Narrative    Works as a nurse at [employer]  Ortho/Oncology  Went to [employer]  After pre-requisites at [employer]   to Otilia since 8/20  Back yard wedding  Came from [facility] when she was 7  Otilia works for the [employer]  Enjoys volleyball          The following portions of the patient's history were reviewed and updated as appropriate: allergies, current medications, past family history, past medical history, past social history, past surgical history and problem list       Review of Systems       /68 (BP Location: Left arm, Patient Position: Sitting, Cuff Size: Standard)   Pulse 73   Temp (!) 97 4 °F (36 3 °C) (Temporal)   Ht 5' 6 75\" (1 695 m)   Wt 79 4 kg (175 lb)   SpO2 99%   BMI 27 61 " kg/m²      Physical Exam   Appears well  Mood is OK  Current Outpatient Medications:   •  Prenatal Vit w/Tx-Mucamfslb-NH (PNV PO), Take 1 tablet by mouth in the morning, Disp: , Rfl:      No problem-specific Assessment & Plan notes found for this encounter  Diagnoses and all orders for this visit:    Attention deficit hyperactivity disorder (ADHD), predominantly inattentive type        Patient Instructions   Doing well overall considering  Recent miscarriage but optimistic for the next time  At the present time, prefers to stay off Adderall although there is low risk of abnormality of pregnancy when on Adderall  Fetal medicine specialist consultation reviewed in regard to her prothrombin gene disorder  Return as needed

## 2023-05-15 ENCOUNTER — TELEPHONE (OUTPATIENT)
Dept: OBGYN CLINIC | Facility: MEDICAL CENTER | Age: 28
End: 2023-05-15

## 2023-05-15 DIAGNOSIS — Z32.01 POSITIVE PREGNANCY TEST: Primary | ICD-10-CM

## 2023-05-15 NOTE — TELEPHONE ENCOUNTER
Patient called into office with positive pregnancy test, LMP was 4/16/23  Patient stated she does have history of miscarriage and was told to have labs drawn early to monitor levels  Patient was advised to have labs drawn at any time  Patient will call with any questions or concerns, labs added to chart

## 2023-05-16 ENCOUNTER — LAB (OUTPATIENT)
Dept: LAB | Facility: CLINIC | Age: 28
End: 2023-05-16

## 2023-05-16 DIAGNOSIS — N92.6 MISSED MENSES: ICD-10-CM

## 2023-05-16 DIAGNOSIS — Z32.01 POSITIVE PREGNANCY TEST: ICD-10-CM

## 2023-05-16 LAB
ABO GROUP BLD: NORMAL
B-HCG SERPL-ACNC: 540 MIU/ML (ref 0–5)
BLD GP AB SCN SERPL QL: NEGATIVE
PROGEST SERPL-MCNC: 24.9 NG/ML
RH BLD: POSITIVE
SPECIMEN EXPIRATION DATE: NORMAL

## 2023-05-17 ENCOUNTER — TELEPHONE (OUTPATIENT)
Dept: OBGYN CLINIC | Facility: MEDICAL CENTER | Age: 28
End: 2023-05-17

## 2023-05-17 DIAGNOSIS — Z32.01 POSITIVE PREGNANCY TEST: Primary | ICD-10-CM

## 2023-05-17 NOTE — RESULT ENCOUNTER NOTE
Please call patient with results  Labs consistent with early pregnancy  4w3d by LMP, labs consistent with this or psb just a bit earlier  Too early to definitively confirm viability and dates by US  Would recommend repeat labs tomorrow (ie approx 48hr after last) to watch rise  Monitor reported cramping and consider tylenol as needed  If bleeding develops, call office back to discuss

## 2023-05-17 NOTE — TELEPHONE ENCOUNTER
Spoke with patient and reviewed lab and results   Pt aware to call with any bleeding or extreme cramping  Aware to increase fluids and take tylenol as needed   Will have labs redrawn tomorrow

## 2023-05-17 NOTE — TELEPHONE ENCOUNTER
Patient called and would like to go over her HCG labs, patient said she is been having a lot of cramps and would like to know the next step   Please review

## 2023-05-18 ENCOUNTER — LAB (OUTPATIENT)
Dept: LAB | Facility: CLINIC | Age: 28
End: 2023-05-18

## 2023-05-18 DIAGNOSIS — Z32.01 POSITIVE PREGNANCY TEST: ICD-10-CM

## 2023-05-18 LAB — B-HCG SERPL-ACNC: 1427 MIU/ML (ref 0–5)

## 2023-05-19 ENCOUNTER — TELEPHONE (OUTPATIENT)
Dept: OBGYN CLINIC | Facility: MEDICAL CENTER | Age: 28
End: 2023-05-19

## 2023-05-19 DIAGNOSIS — Z32.01 POSITIVE PREGNANCY TEST: Primary | ICD-10-CM

## 2023-05-19 NOTE — RESULT ENCOUNTER NOTE
Please call patient with results  Hcg levels are rising as expected  Please have her repeat hcg in 1wk to monitor, schedule US in 2-4wks for pregnancy dating/location  Can complete here or with radiology

## 2023-05-25 ENCOUNTER — APPOINTMENT (OUTPATIENT)
Dept: LAB | Facility: CLINIC | Age: 28
End: 2023-05-25

## 2023-05-25 DIAGNOSIS — Z32.01 POSITIVE PREGNANCY TEST: ICD-10-CM

## 2023-05-25 LAB — B-HCG SERPL-ACNC: ABNORMAL MIU/ML (ref 0–5)

## 2023-05-28 DIAGNOSIS — Z32.01 POSITIVE PREGNANCY TEST: Primary | ICD-10-CM

## 2023-05-30 ENCOUNTER — TELEPHONE (OUTPATIENT)
Dept: OBGYN CLINIC | Facility: MEDICAL CENTER | Age: 28
End: 2023-05-30

## 2023-06-01 ENCOUNTER — APPOINTMENT (OUTPATIENT)
Dept: LAB | Facility: CLINIC | Age: 28
End: 2023-06-01
Payer: COMMERCIAL

## 2023-06-01 DIAGNOSIS — Z32.01 POSITIVE PREGNANCY TEST: ICD-10-CM

## 2023-06-01 LAB — B-HCG SERPL-ACNC: ABNORMAL MIU/ML (ref 0–5)

## 2023-06-01 PROCEDURE — 84702 CHORIONIC GONADOTROPIN TEST: CPT

## 2023-06-01 PROCEDURE — 36415 COLL VENOUS BLD VENIPUNCTURE: CPT

## 2023-06-16 ENCOUNTER — ULTRASOUND (OUTPATIENT)
Dept: OBGYN CLINIC | Facility: MEDICAL CENTER | Age: 28
End: 2023-06-16
Payer: COMMERCIAL

## 2023-06-16 VITALS
HEIGHT: 67 IN | DIASTOLIC BLOOD PRESSURE: 64 MMHG | SYSTOLIC BLOOD PRESSURE: 115 MMHG | BODY MASS INDEX: 27.56 KG/M2 | WEIGHT: 175.6 LBS

## 2023-06-16 DIAGNOSIS — Z32.01 PREGNANCY CONFIRMED BY POSITIVE BLOOD TEST: Primary | ICD-10-CM

## 2023-06-16 PROCEDURE — 76817 TRANSVAGINAL US OBSTETRIC: CPT | Performed by: STUDENT IN AN ORGANIZED HEALTH CARE EDUCATION/TRAINING PROGRAM

## 2023-06-16 PROCEDURE — 99214 OFFICE O/P EST MOD 30 MIN: CPT | Performed by: STUDENT IN AN ORGANIZED HEALTH CARE EDUCATION/TRAINING PROGRAM

## 2023-06-16 NOTE — PROGRESS NOTES
"Pregnancy Confirmation Visit  [facility] [address] [place] [address]    Assessment/Plan:  33 y o  Winferd Plaster presenting with missed menses  Viable pregnancy 8w5d by LMP consistent with ultrasound today (JOSE ELIAS 01/21/2024)  - Continue/start prenatal vitamin  - We reviewed her current medications and discussed which are safe to continue in pregnancy  - We briefly discussed options for aneuploidy screening, to be discussed further at the prenatal intake  - Schedule prenatal intake with RN and initial prenatal visit; prenatal labs will be ordered during the prenatal intake      Subjective:    CC: Missed period    Ligia James is a 33 y o  Winferd Plaster who presents with missed menses  Patient's last menstrual period was 04/16/2023 (exact date)  Patient notes that this pregnancy was planned and desired  She was not using contraception at the time of conception  She reports she is certain of her LMP and that she has regular menses  She has no vaginal bleeding since her LMP      Objective:  /64   Ht 5' 6 75\" (1 695 m)   Wt 79 7 kg (175 lb 9 6 oz)   LMP 04/16/2023 (Exact Date)   BMI 27 71 kg/m²     Physical Exam:  General: Well appearing, no distress  CV: Regular rate  Respiratory: Unlabored breathing  Abdomen: Soft, nontender  Extremities: Without edema  Mood and Affect: Appropriate    Transvaginal Pelvic Ultrasound  Malhotra IUP  Yolk sac: Present  Fetal Pole: Present  CRL consistent with [id number]  Cardiac activity: Present   bpm  No adnexal masses appreciated                      "

## 2023-06-27 ENCOUNTER — APPOINTMENT (OUTPATIENT)
Dept: LAB | Facility: MEDICAL CENTER | Age: 28
End: 2023-06-27
Payer: COMMERCIAL

## 2023-06-27 ENCOUNTER — INITIAL PRENATAL (OUTPATIENT)
Dept: OBGYN CLINIC | Facility: MEDICAL CENTER | Age: 28
End: 2023-06-27

## 2023-06-27 VITALS
WEIGHT: 178.4 LBS | DIASTOLIC BLOOD PRESSURE: 66 MMHG | BODY MASS INDEX: 28 KG/M2 | HEIGHT: 67 IN | SYSTOLIC BLOOD PRESSURE: 112 MMHG

## 2023-06-27 DIAGNOSIS — Z34.81 PRENATAL CARE, SUBSEQUENT PREGNANCY, FIRST TRIMESTER: Primary | ICD-10-CM

## 2023-06-27 DIAGNOSIS — Z34.81 PRENATAL CARE, SUBSEQUENT PREGNANCY, FIRST TRIMESTER: ICD-10-CM

## 2023-06-27 LAB
BASOPHILS # BLD AUTO: 0.02 THOUSANDS/ÂΜL (ref 0–0.1)
BASOPHILS NFR BLD AUTO: 0 % (ref 0–1)
BILIRUB UR QL STRIP: NEGATIVE
CLARITY UR: CLEAR
COLOR UR: YELLOW
EOSINOPHIL # BLD AUTO: 0.04 THOUSAND/ÂΜL (ref 0–0.61)
EOSINOPHIL NFR BLD AUTO: 1 % (ref 0–6)
ERYTHROCYTE [DISTWIDTH] IN BLOOD BY AUTOMATED COUNT: 12.6 % (ref 11.6–15.1)
GLUCOSE UR STRIP-MCNC: NEGATIVE MG/DL
HBV SURFACE AB SER-ACNC: 17.8 MIU/ML
HBV SURFACE AG SER QL: NORMAL
HCT VFR BLD AUTO: 37.5 % (ref 34.8–46.1)
HGB BLD-MCNC: 13 G/DL (ref 11.5–15.4)
HGB UR QL STRIP.AUTO: NEGATIVE
HIV 1+2 AB+HIV1 P24 AG SERPL QL IA: NORMAL
HIV 2 AB SERPL QL IA: NORMAL
HIV1 AB SERPL QL IA: NORMAL
HIV1 P24 AG SERPL QL IA: NORMAL
IMM GRANULOCYTES # BLD AUTO: 0.02 THOUSAND/UL (ref 0–0.2)
IMM GRANULOCYTES NFR BLD AUTO: 0 % (ref 0–2)
KETONES UR STRIP-MCNC: NEGATIVE MG/DL
LEUKOCYTE ESTERASE UR QL STRIP: NEGATIVE
LYMPHOCYTES # BLD AUTO: 1.67 THOUSANDS/ÂΜL (ref 0.6–4.47)
LYMPHOCYTES NFR BLD AUTO: 21 % (ref 14–44)
MCH RBC QN AUTO: 29.7 PG (ref 26.8–34.3)
MCHC RBC AUTO-ENTMCNC: 34.7 G/DL (ref 31.4–37.4)
MCV RBC AUTO: 86 FL (ref 82–98)
MONOCYTES # BLD AUTO: 0.44 THOUSAND/ÂΜL (ref 0.17–1.22)
MONOCYTES NFR BLD AUTO: 6 % (ref 4–12)
NEUTROPHILS # BLD AUTO: 5.74 THOUSANDS/ÂΜL (ref 1.85–7.62)
NEUTS SEG NFR BLD AUTO: 72 % (ref 43–75)
NITRITE UR QL STRIP: NEGATIVE
NRBC BLD AUTO-RTO: 0 /100 WBCS
PH UR STRIP.AUTO: 6 [PH]
PLATELET # BLD AUTO: 237 THOUSANDS/UL (ref 149–390)
PMV BLD AUTO: 9.7 FL (ref 8.9–12.7)
PROT UR STRIP-MCNC: NEGATIVE MG/DL
RBC # BLD AUTO: 4.37 MILLION/UL (ref 3.81–5.12)
RUBV IGG SERPL IA-ACNC: 29.3 IU/ML
SP GR UR STRIP.AUTO: 1.02 (ref 1–1.03)
TREPONEMA PALLIDUM IGG+IGM AB [PRESENCE] IN SERUM OR PLASMA BY IMMUNOASSAY: NORMAL
UROBILINOGEN UR STRIP-ACNC: <2 MG/DL
WBC # BLD AUTO: 7.93 THOUSAND/UL (ref 4.31–10.16)

## 2023-06-27 PROCEDURE — 81003 URINALYSIS AUTO W/O SCOPE: CPT

## 2023-06-27 PROCEDURE — 87086 URINE CULTURE/COLONY COUNT: CPT

## 2023-06-27 PROCEDURE — 87389 HIV-1 AG W/HIV-1&-2 AB AG IA: CPT

## 2023-06-27 PROCEDURE — 85025 COMPLETE CBC W/AUTO DIFF WBC: CPT

## 2023-06-27 PROCEDURE — OBC

## 2023-06-27 PROCEDURE — 36415 COLL VENOUS BLD VENIPUNCTURE: CPT

## 2023-06-27 PROCEDURE — 86780 TREPONEMA PALLIDUM: CPT

## 2023-06-27 PROCEDURE — 87340 HEPATITIS B SURFACE AG IA: CPT

## 2023-06-27 PROCEDURE — 86706 HEP B SURFACE ANTIBODY: CPT

## 2023-06-27 PROCEDURE — 86762 RUBELLA ANTIBODY: CPT

## 2023-06-27 NOTE — PROGRESS NOTES
OB History    Para Term  AB Living   2 0 0 0 1 0   SAB IAB Ectopic Multiple Live Births   1 0 0 0 0      # Outcome Date GA Lbr Newton/2nd Weight Sex Delivery Anes PTL Lv   2 Current            1 SAB               Obstetric Comments   Menarche age 9   bcp-9yrs         * Pt presents for OB intake  *  *Pt's LMP was Patient's last menstrual period was 2023 (exact date)  *Ultrasound date:23   8weeks 5days  *Estimated date of delivery: 24   * confirmed by LMP    *Signs/Symptoms of Pregnancy   *no Constipation    *no Headaches   *no Cramping  *no Spotting   *yes  Vomiting     Diabetes     *no Hx of GDM    *no BMI >35    *no First degree relative with type 2 diabetes    *no Hx of PCOS     Hypertension-    *no Hx of chronic HTN    *no Hx of gestational HTN   *no hx of preeclampsia, eclampsia, or HELLP syndrome     *Infection Screening   *no Does the pt have a hx of MRSA? *yes History of herpes? - cold sores    *Immunizations:   *yes Discussed influenza vaccine   *yes Discussed TDaP vaccine   *yes COVID Vaccine x2    ACTIVE MEDICAL/MENTAL HEALTH CONDITIONS  Asthma: *yes   Prothrombin gene mutation  Depression *no   Anxiety*no  Medications*no    *Interview education   *Handouts given:    *[facility] support center     *Lab Locations    *[facility] Parent[facility] Classes    *Schedule for Prenatal Visits    *Pregnancy Warning Signs Reviewed    *Safe Medications During Pregnancy    *CPT Code Sheet/MFM 13week NT pamphlet    *[employer]      SBIRT Screen:  Depression Screening Follow-up Plan: Patient's depression screening was negative with an [place] score of 5          *[facility]   *yes discussed genetic testing- pt interested   Patient has been informed of basic prenatal advice such as avoiding alcohol, drugs, and smoking  She should remain hydrated and take daily prenatal vitamins   Patient should avoid caffeine, raw sprouts, high mercury fish, undercooked fish, raw eggs, organ meat, unwashed produce, and unpasteurized cheeses, milk, and fruit juice and undercooked meats  She has been informed about toxoplasmosis and to avoid cat feces  *Details that I feel the provider should be aware of:  Gabe Merino came in for her OB intake at 10w2d  She c/o nausea and vomiting  Pregnancy medication list reviewed  Prenatal labs ordered  MFM appointments already scheduled  PN1 visit scheduled for *7/18  The patient was oriented to our practice and all questions were answered      Interviewed by:  Rosie Hanna RN

## 2023-06-29 LAB — BACTERIA UR CULT: NORMAL

## 2023-07-03 ENCOUNTER — TELEPHONE (OUTPATIENT)
Dept: OBGYN CLINIC | Facility: MEDICAL CENTER | Age: 28
End: 2023-07-03

## 2023-07-03 ENCOUNTER — CLINICAL SUPPORT (OUTPATIENT)
Dept: OBGYN CLINIC | Facility: MEDICAL CENTER | Age: 28
End: 2023-07-03

## 2023-07-03 VITALS — DIASTOLIC BLOOD PRESSURE: 76 MMHG | SYSTOLIC BLOOD PRESSURE: 124 MMHG

## 2023-07-03 DIAGNOSIS — O21.9 NAUSEA AND VOMITING IN PREGNANCY: Primary | ICD-10-CM

## 2023-07-03 DIAGNOSIS — Z34.91 PRESENCE OF FETAL HEART SOUNDS IN FIRST TRIMESTER: Primary | ICD-10-CM

## 2023-07-03 RX ORDER — METOCLOPRAMIDE 10 MG/1
10 TABLET ORAL 3 TIMES DAILY PRN
Qty: 90 TABLET | Refills: 0 | Status: SHIPPED | OUTPATIENT
Start: 2023-07-03

## 2023-07-03 NOTE — PROGRESS NOTES
Patient present for fetal heart tones. She has been going through a stressful time with a recent family emergency and her work. She is having constant nausea and vomiting. Vitamin B6 and Unisom not really helping. Discussed with provider and rx for Reglan will be sent to pharmacy. Denies any other symptoms. Letter for work days missed due to illness provided.  [id number]

## 2023-07-03 NOTE — TELEPHONE ENCOUNTER
Patient called into office at 11 weeks pregnant. She stated that she had a lot of stress this weekend due to a family emergency and she is concerned about the wellbeing of the baby. Spoke with Tereso Lozano who suggested patient come in for heart tones. Patient agreed to treatment plan.

## 2023-07-17 PROBLEM — Z31.69 ENCOUNTER FOR PRECONCEPTION CONSULTATION: Status: RESOLVED | Noted: 2023-01-31 | Resolved: 2023-07-17

## 2023-07-17 PROBLEM — Z3A.13 13 WEEKS GESTATION OF PREGNANCY: Status: ACTIVE | Noted: 2023-07-17

## 2023-07-17 NOTE — PATIENT INSTRUCTIONS
Medications and Pregnancy  The following list of over-the-counter medications is usually considered safe to take during pregnancy. Take care to not double up on products containing acetaminophen (Tylenol). Colds/Sore Throat  Robitussin DM - Plain (guaifenesin)  Saline nasal spray  Warm salt water gargle  Cepacol throat lozenges or mouthwash (cetylpyridinium)  Sucrets (hexylresoricinol)  Allergy  AVOID the “D” - or DECONGESTANT  Claritin (loratadine)  Zrytec (cetirizine)  Allerga (fexofenadine)  Headaches / Aches and Pains  Tylenol (acetaminophen)  Do NOT exceed more than 3000 mg of Tylenol in a 24-hour period. Heartburn  Mylanta (aluminum hydroxide/simethicone, magnesium hydroxide)  Maalaox (aluminum magnesium hydroxide, magnesium hydroxide)  Tums (calcium carbonate)  Riopan (magaldrate)  Constipation  Colace (docusate sodium)  Surfak (docusate sodium)  MiraLAX  Glycerin suppositories  Fleets enema (sodium phosphate & sodium biphosphate)  Nausea/Vomiting  Vitamin B6 (pyridoxine) - May take 50 mg at bedtime, 25 mg in the morning, 25 mg in the afternoon  Unisom (doxylamine) - May use for nausea/vomiting - (cut a 25 mg tablet in half). May cause drowsiness. Sleep  Benadryl (diphenhydramine) - Take 1-2 tablets as needed at bedtime  Unisom (doxylamine) 25 mg tablet - As needed at bedtime  Melatonin 5 mg tablet - As needed at bedtime    Generally the generic form of medicine is usually lower priced than the brand name form of the medicine. Talk to your healthcare provider before you take any medicines. Many medicines may harm your baby if you take them when you are pregnant. Do not take any medicines, vitamins, herbs, or supplements without first talking to your healthcare provider.

## 2023-07-17 NOTE — PROGRESS NOTES
Prenatal H&P     Denies loss of fluid, vaginal discharge, vaginal bleeding  and abdominal pain. Not feeling fetal movement. Tolerating PNV well. Prenatal panel reviewed -WNL. Plans for genetic screening appointment today. Planned pregnancy. 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 She denies a family history of inheritable conditions such as physical or intellectual disabilities, birth defects, blood disorders, heart or neural tube defects. She has never had MRSA. Recent travel to [place]. Denies travel planned in the near future     Patient Plans   - Feeding breast  - Contraception condoms   - Aware office delivers at [place]. If < 32 weeks will recommend evaluation at [address] [place] [address]:  - Continue prenatal vitamin daily  - Genetic screening/ [facility] appointment today  - ADHD in pregnancy no longer taking Adderal .  Doing okay  - prothrombin gene mutation - anticoagulation not necessary see MFM consult 2/2023. Reviewed with patient MFM will likely recommend low-dose aspirin use 162 mg daily. Has appointment today  -  Weight gain in pregnancy- Who BMI recommend 15-25 lb .  Healthy diet and daily exercise reviewed and encouraged  - Unit regimen reviewed visit every 4 weeks until 28 weeks, every 2 weeks until 36 weeks and then weekly until delivery. - Gonorrhea/ chlamydia collected. Pap smear UTD   - Vaccines in Pregnancy      - TDAP vaccine after 27 gestational weeks     - Reviewed with patient  Pregnancy with COVID infection is considered  high risk and can have poor outcome including  delivery, more severe infection. therefore  pregnant patients are recommended to get  COVID-19 vaccination. Written information provided. Had vaccine x 2      - influenza October- March NA  -  Common discomforts of pregnancy and precautions reviewed. Signs and symptoms to report reviewed. Encouraged social distancing, good hand hygiene, masking, avoiding crowds and written information provided about COVID-19.   RTO 4 weeks f/u US

## 2023-07-18 ENCOUNTER — ROUTINE PRENATAL (OUTPATIENT)
Dept: PERINATAL CARE | Facility: CLINIC | Age: 28
End: 2023-07-18
Payer: COMMERCIAL

## 2023-07-18 ENCOUNTER — INITIAL PRENATAL (OUTPATIENT)
Dept: OBGYN CLINIC | Facility: MEDICAL CENTER | Age: 28
End: 2023-07-18

## 2023-07-18 VITALS — SYSTOLIC BLOOD PRESSURE: 116 MMHG | BODY MASS INDEX: 27.77 KG/M2 | WEIGHT: 176 LBS | DIASTOLIC BLOOD PRESSURE: 62 MMHG

## 2023-07-18 VITALS
DIASTOLIC BLOOD PRESSURE: 67 MMHG | WEIGHT: 175.2 LBS | SYSTOLIC BLOOD PRESSURE: 126 MMHG | HEIGHT: 67 IN | HEART RATE: 87 BPM | BODY MASS INDEX: 27.5 KG/M2

## 2023-07-18 DIAGNOSIS — Z36.82 ENCOUNTER FOR ANTENATAL SCREENING FOR NUCHAL TRANSLUCENCY: ICD-10-CM

## 2023-07-18 DIAGNOSIS — Z34.92 PRENATAL CARE IN SECOND TRIMESTER: Primary | ICD-10-CM

## 2023-07-18 DIAGNOSIS — D68.52 HETEROZYGOUS FOR PROTHROMBIN G20210A MUTATION (HCC): ICD-10-CM

## 2023-07-18 DIAGNOSIS — D68.52 PROTHROMBIN GENE MUTATION (HCC): ICD-10-CM

## 2023-07-18 DIAGNOSIS — Z11.3 ROUTINE SCREENING FOR STI (SEXUALLY TRANSMITTED INFECTION): ICD-10-CM

## 2023-07-18 DIAGNOSIS — O21.9 NAUSEA/VOMITING IN PREGNANCY: Primary | ICD-10-CM

## 2023-07-18 DIAGNOSIS — Z3A.13 13 WEEKS GESTATION OF PREGNANCY: ICD-10-CM

## 2023-07-18 DIAGNOSIS — Z32.01 PREGNANCY CONFIRMED BY POSITIVE BLOOD TEST: ICD-10-CM

## 2023-07-18 DIAGNOSIS — F90.0 ATTENTION DEFICIT HYPERACTIVITY DISORDER (ADHD), PREDOMINANTLY INATTENTIVE TYPE: ICD-10-CM

## 2023-07-18 PROCEDURE — 87591 N.GONORRHOEAE DNA AMP PROB: CPT | Performed by: NURSE PRACTITIONER

## 2023-07-18 PROCEDURE — 87491 CHLMYD TRACH DNA AMP PROBE: CPT | Performed by: NURSE PRACTITIONER

## 2023-07-18 PROCEDURE — PNV: Performed by: NURSE PRACTITIONER

## 2023-07-18 RX ORDER — ONDANSETRON 4 MG/1
4 TABLET, ORALLY DISINTEGRATING ORAL EVERY 8 HOURS PRN
Qty: 12 TABLET | Refills: 0 | Status: SHIPPED | OUTPATIENT
Start: 2023-07-18 | End: 2023-08-17

## 2023-07-18 RX ORDER — ASPIRIN 81 MG/1
81 TABLET, CHEWABLE ORAL DAILY
Qty: 30 TABLET | Refills: 5 | Status: SHIPPED | OUTPATIENT
Start: 2023-07-18

## 2023-07-18 NOTE — LETTER
Date: 2023    Yulia Patiño. Edgar Norris, [address] [facility]  [place]    Patient: Pia Kwok   YOB: 1995   Date of Visit: 2023   Gestational age 15w4d   Aleah Konrad of this communication: Routine though please note I recommended for antepartum management either prophy LMWH or surveillance; she will see hematology to help decide. Dear Nelly Blanco,    This patient was seen recently in our  office. Please see ultrasound report under "OB Procedures" tab. Please don't hesitate to contact our office with any concerns or questions.       Sincerely,      Michel Biggs MD  Attending Physician, [address] [place] [address]

## 2023-07-18 NOTE — PROGRESS NOTES
Patient chose to have [facility] Non-invasive Prenatal Screen with fetal sex (per pt request). Patient given brochure and is aware [facility] will contact their insurance and coordinate coverage. Patient made aware she will need to respond to text message or e-mail from [address] within 2 business days or testing will be run through insurance. Patient informed text message will come from area code  "415". Provided [employer] # [phone] and web site : [email or web address].   "[place] your test online" card with barcode and test tube ID provided to patient. Reviewed [employer]'s web site states 5-7 business days for results via their portal.   [employer] message will be sent to patient when MFM receives results /provider reviews. 2 vials of blood drawn from left arm by Danny Doyle RN. Patient tolerated blood draw without difficulty. Specimens labeled with patient identifiers (name, date of birth, specimen collection date), order and specimen were verified with patient, packed and sent via [address]. [employer]  tracking #  [id number]  Copy of lab order scanned to Epic media. Maternal Fetal Medicine will have results in approximately 7-10 business days and will call patient or notify via [address] [place] [address]. Patient aware viewing lab result online will reveal fetal sex if ordered. Patient verbalized understanding of all instructions and no questions at this time.

## 2023-07-18 NOTE — PATIENT INSTRUCTIONS
Thank you for choosing us for your  care today. If you have any questions about your ultrasound or care, please do not hesitate to contact us or your primary obstetrician. Some general instructions for your pregnancy are:    Exercise: Aim for 22 minutes per day (150 minutes per week) of regular exercise. Walking is great! Nutrition: Choose healthy sources of calcium, iron, and protein. Learn about Preeclampsia: preeclampsia is a common, serious high blood pressure complication in pregnancy. A blood pressure of [id number] (systolic or top number) or 69JRRB (diastolic or bottom number) is not normal and needs evaluation by your doctor. Aspirin is sometimes prescribed in early pregnancy to prevent preeclampsia in women with risk factors - ask your obstetrician if you should be on this medication. For more resources, visit:  [employer]  If you smoke, try to reduce how many cigarettes you smoke or try to quit completely. Do not vape. Other warning signs to watch out for in pregnancy or postpartum: chest pain, obstructed breathing or shortness of breath, seizures, thoughts of hurting yourself or your baby, bleeding, a painful or swollen leg, fever, or headache (see [employer] POST-BIRTH Warning Signs campaign). If these happen call 911. Itching is also not normal in pregnancy and if you experience this, especially over your hands and feet, potentially worse at night, notify your doctors.

## 2023-07-19 PROBLEM — D68.52 HETEROZYGOUS FOR PROTHROMBIN G20210A MUTATION (HCC): Status: ACTIVE | Noted: 2023-07-19

## 2023-07-19 LAB
C TRACH DNA SPEC QL NAA+PROBE: NEGATIVE
N GONORRHOEA DNA SPEC QL NAA+PROBE: NEGATIVE

## 2023-07-19 NOTE — PROGRESS NOTES
[id number]  [facility] [place]: Ms. Louana Pallas was seen today for nuchal translucency ultrasound. See ultrasound report under "OB Procedures" tab. MDM:   I. Diagnoses/Problems addressed:  Stable chronic illness: prothrombin gene mutation  II. Data: I ordered the following tests: NIPS. III. Risk of morbidity: Moderate (Rx management)    Please don't hesitate to contact our office with any concerns or questions.   John Osorio MD

## 2023-07-26 ENCOUNTER — TELEPHONE (OUTPATIENT)
Facility: HOSPITAL | Age: 28
End: 2023-07-26

## 2023-07-26 NOTE — TELEPHONE ENCOUNTER
Left VMM for patient with results of her [facility] NIPS test, gender not provided. Patient instructed on MSAFP being ordered by OB and timing of test. Patient instructed to contact the nurse line with any questions.

## 2023-07-26 NOTE — TELEPHONE ENCOUNTER
----- Message from Natalio Lima MD sent at 2023 11:18 AM EDT -----  Hi  RN staff, I've reviewed this NIPS result which shows low residual risk for the conditions tested (trisomies 13, 18, and 21, and sex chromosome abnormality), can you call her to inform her of this result if she has not viewed her result via [place]? Her OB office is to order the MSAFP. Thank you.   Natalio Lima MD

## 2023-08-11 ENCOUNTER — CONSULT (OUTPATIENT)
Dept: HEMATOLOGY ONCOLOGY | Facility: CLINIC | Age: 28
End: 2023-08-11
Payer: COMMERCIAL

## 2023-08-11 ENCOUNTER — APPOINTMENT (OUTPATIENT)
Dept: LAB | Facility: MEDICAL CENTER | Age: 28
End: 2023-08-11
Payer: COMMERCIAL

## 2023-08-11 VITALS
DIASTOLIC BLOOD PRESSURE: 64 MMHG | TEMPERATURE: 96.9 F | WEIGHT: 180 LBS | HEIGHT: 67 IN | OXYGEN SATURATION: 98 % | BODY MASS INDEX: 28.25 KG/M2 | RESPIRATION RATE: 18 BRPM | SYSTOLIC BLOOD PRESSURE: 112 MMHG | HEART RATE: 86 BPM

## 2023-08-11 DIAGNOSIS — Z82.49 FAMILY HISTORY OF DVT: ICD-10-CM

## 2023-08-11 DIAGNOSIS — Z83.2 FAMILY HISTORY OF ANTIPHOSPHOLIPID SYNDROME: ICD-10-CM

## 2023-08-11 DIAGNOSIS — Z3A.16 16 WEEKS GESTATION OF PREGNANCY: ICD-10-CM

## 2023-08-11 DIAGNOSIS — D68.52 HETEROZYGOUS FOR PROTHROMBIN G20210A MUTATION (HCC): Primary | ICD-10-CM

## 2023-08-11 DIAGNOSIS — O09.299 HISTORY OF MISCARRIAGE, CURRENTLY PREGNANT: ICD-10-CM

## 2023-08-11 PROCEDURE — 85732 THROMBOPLASTIN TIME PARTIAL: CPT

## 2023-08-11 PROCEDURE — 85705 THROMBOPLASTIN INHIBITION: CPT

## 2023-08-11 PROCEDURE — 99204 OFFICE O/P NEW MOD 45 MIN: CPT

## 2023-08-11 PROCEDURE — 85670 THROMBIN TIME PLASMA: CPT

## 2023-08-11 PROCEDURE — 36415 COLL VENOUS BLD VENIPUNCTURE: CPT

## 2023-08-11 PROCEDURE — 86147 CARDIOLIPIN ANTIBODY EA IG: CPT

## 2023-08-11 PROCEDURE — 85613 RUSSELL VIPER VENOM DILUTED: CPT

## 2023-08-11 PROCEDURE — 86146 BETA-2 GLYCOPROTEIN ANTIBODY: CPT

## 2023-08-11 NOTE — PROGRESS NOTES
[address] [facility] [address]  Hematology Ambulatory Consult  [address] [facility], 1995, [id number]  2023    Assessment/Plan:  1. 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 16 weeks gestation of pregnancy  3. Family history of DVT  4. Family history of antiphospholipid syndrome  5. History of miscarriage, currently pregnant  Ms. Kristen Reddy is a 52-year-old female seen in consultation for anticoagulation recommendations. She has a personal history of heterozygous prothrombin gene mutation diagnosed in  and is currently 16 weeks gestation. She has no personal history of VTE. She does have a family history of VTE in her mother at the age of 40 who was diagnosed with prothrombin gene mutation as well as lupus anticoagulant and is now on Coumadin lifelong/indefinite. She also has a personal history of a miscarriage at 6 weeks gestation. For these reasons I have ordered for her to have testing of lupus anticoagulant, cardiolipin antibody, or beta-2 glycoprotein which I have ordered for her today. I discussed with the patient that due to no personal history of VTE and a family history under the age of 48 of VTE with only a diagnosis of heterozygous prothrombin gene mutation anticoagulation could be considered antepartum. We also discussed that if she were to deliver via  6 weeks of postpartum Lovenox would be recommended. If she does have a diagnosis of lupus anticoagulant, cardiolipin antibody or beta-2 glycoprotein anticoagulation with Lovenox would definitely be recommended for VTE prophylaxis. She will have this testing drawn today after leaving the office and start the 81 mg of aspirin daily suggested by [facility] previously. She knows I will reach out to her with the results as well as discussed with [facility] initiation of anticoagulation if indicated.   I reviewed with her today in the office signs and symptoms of VTE which she should seek evaluation for. - Lupus anticoagulant; Future  - Cardiolipin antibody; Future  - Beta-2 glycoprotein antibodies; Future          Based on the results of upcoming labs we will determine if further ongoing hematology follow-up is necessary. Patient voiced agreement and understanding to the above. Patient knows to call the Hematology/Oncology office with any questions and concerns regarding the above. Barrier(s) to care: None. The patient is able to self care.    -------------------------------------------------------------------------------------------------------    Chief Complaint   Patient presents with   • Follow-up       Referring provider:  Clary Lassiter MD  [address] [place] [address]    History of present illness:  Tierra Sutherland is a 73-year-old female with no significant past medical history seen in consultation for anticoagulation recommendations. She is currently 16 weeks gestation and was diagnosed with heterozygous prothrombin gene mutation in 2022. She was prescribed 81 mg of aspirin daily by [facility] and has not started this as of yet. She does have a history of a miscarriage at 6 weeks gestation. No other previous pregnancies. She has no prior history of VTE, MI, or CVA. She was previously on birth control up until her diagnosis in 2022. She underwent testing after her mom had a DVT at 40years old. She was diagnosed as well with heterozygous prothrombin gene mutation as well as lupus anticoagulant and now on Coumadin lifelong/indefinite. She also has a family history in her maternal grandfather of VTE. She does not have any history of any kidney or liver disorders, autoimmune conditions. She is not a smoker. She is up-to-date on Pap smears.   She only has a surgical history with wisdom teeth removal.        Component 11/2/22 12:16 PM   Prothrombin Mutation Comment Abnormal     Comment: Result: c.*97G>A - Detected, Heterozygous   This result is consistent with a 2- to 4-fold increased risk for   venous thromboembolism. Review of Systems   Constitutional: Negative for activity change, appetite change, diaphoresis, fatigue, fever and unexpected weight change. HENT: Negative. Eyes: Negative for photophobia and visual disturbance. Respiratory: Negative. Negative for cough, chest tightness and shortness of breath. Cardiovascular: Negative. Negative for leg swelling. Gastrointestinal: Negative. Endocrine: Negative for cold intolerance and heat intolerance. Genitourinary: Negative. Musculoskeletal: Negative. Skin: Negative for color change, pallor and rash. Neurological: Negative. Hematological: Negative. Psychiatric/Behavioral: Negative.         Patient Active Problem List   Diagnosis   • Attention deficit hyperactivity disorder (ADHD)   • Prothrombin gene mutation (HCC)   • 13 weeks gestation of pregnancy   • Heterozygous for prothrombin [id number] mutation ([address])       Past Medical History:   Diagnosis Date   • ADHD    • Asthma    • Fibroadenoma of breast, left    • Prothrombin gene mutation ([address])        Past Surgical History:   Procedure Laterality Date   • WISDOM TOOTH EXTRACTION         Family History   Problem Relation Age of Onset   • Deep vein thrombosis Mother    • Other Mother         Prothrombin gene mutation   • Thyroid disease Mother    • No Known Problems Father    • No Known Problems Maternal Grandmother    • Diabetes Maternal Grandfather    • Heart disease Maternal Grandfather    • Heart attack Maternal Grandfather    • No Known Problems Paternal Grandmother    • Heart disease Paternal Grandfather    • Heart attack Paternal Grandfather    • Breast cancer Other 61   • Lung cancer Other    • Colon cancer Neg Hx    • Ovarian cancer Neg Hx        Social History     Socioeconomic History   • Marital status: /Civil Union     Spouse name: Not on file   • Number of children: Not on file   • Years of education: Not on file   • Highest education level: Not on file   Occupational History   • Not on file   Tobacco Use   • Smoking status: Never   • Smokeless tobacco: Never   Vaping Use   • Vaping Use: Never used   Substance and Sexual Activity   • Alcohol use: Not Currently     Comment: occasional   • Drug use: Never   • Sexual activity: Yes     Partners: Male     Birth control/protection: None   Other Topics Concern   • Not on file   Social History Narrative    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 Went to [employer]. After pre-requisites at [facility] [employer].  to Marky since 8/20. Back yard wedding. Came from [facility] when she was 7. Marky works for the [employer]. Enjoys [employer]. Social Determinants of Health     Financial Resource Strain: Not on file   Food Insecurity: Not on file   Transportation Needs: Not on file   Physical Activity: Not on file   Stress: Not on file   Social Connections: Not on file   Intimate Partner Violence: Not on file   Housing Stability: Not on file         Current Outpatient Medications:   •  metoclopramide (Reglan) 10 mg tablet, Take 1 tablet (10 mg total) by mouth 3 (three) times a day as needed (nausea/ vomiting in pregnancy), Disp: 90 tablet, Rfl: 0  •  ondansetron (ZOFRAN-ODT) 4 mg disintegrating tablet, Take 1 tablet (4 mg total) by mouth every 8 (eight) hours as needed for nausea or vomiting, Disp: 12 tablet, Rfl: 0  •  Prenatal Vit w/Zd-Gyevydhjl-BG (PNV PO), Take 1 tablet by mouth in the morning, Disp: , Rfl:   •  aspirin 81 mg chewable tablet, Chew 1 tablet (81 mg total) daily Chew or swallow one tablets daily. Stop at 36 weeks.  (Patient not taking: Reported on 8/11/2023), Disp: 30 tablet, Rfl: 5    Allergies   Allergen Reactions   • Shellfish-Derived Products - Food Allergy Swelling       Objective:  /64 (BP Location: Left arm)   Pulse 86   Temp (!) 96.9 °F (36.1 °C) (Tympanic) Resp 18   Ht 5' 6.5" (1.689 m)   Wt 81.6 kg (180 lb)   LMP 04/16/2023 (Exact Date)   SpO2 98%   BMI 28.62 kg/m²   Physical Exam  Constitutional:       General: She is not in acute distress. Appearance: Normal appearance. She is normal weight. She is not ill-appearing. HENT:      Head: Atraumatic. Eyes:      Extraocular Movements: Extraocular movements intact. Conjunctiva/sclera: Conjunctivae normal.   Cardiovascular:      Rate and Rhythm: Normal rate. Pulses: Normal pulses. Pulmonary:      Effort: Pulmonary effort is normal. No respiratory distress. Abdominal:      Tenderness: There is no abdominal tenderness. Musculoskeletal:         General: Normal range of motion. Cervical back: Normal range of motion. No tenderness. Right lower leg: No edema. Left lower leg: No edema. Lymphadenopathy:      Cervical: No cervical adenopathy. Skin:     General: Skin is warm and dry. Capillary Refill: Capillary refill takes less than 2 seconds. Coloration: Skin is not jaundiced or pale. Neurological:      General: No focal deficit present. Mental Status: She is alert and oriented to person, place, and time. Motor: No weakness. Gait: Gait normal.   Psychiatric:         Mood and Affect: Mood normal.         Behavior: Behavior normal.         Thought Content:  Thought content normal.         Judgment: Judgment normal.         Result Review  Labs:   Initial Prenatal on 07/18/2023   Component Date Value Ref Range Status   • N gonorrhoeae, DNA Probe 07/18/2023 Negative  Negative Final   • Chlamydia trachomatis, DNA Probe 07/18/2023 Negative  Negative Final   Appointment on 06/27/2023   Component Date Value Ref Range Status   • HIV-1 p24 Antigen 06/27/2023 Non-Reactive  Non-Reactive Final   • HIV-1 Antibody 06/27/2023 Non-Reactive  Non-Reactive Final   • HIV-2 Antibody 06/27/2023 Non-Reactive  Non-Reactive Final   • HIV Ag-Ab 5th Gen 06/27/2023 Non-Reactive Non-Reactive Final    A Non-Reactive test result does not preclude the possibility of exposure or infection with HIV-1 and/or HIV-2. Non-Reactive results can occur if the quantity of marker present is below the detection limits or is not present during the stage of disease in which a sample is collected. Repeat testing should be considered where there is clinical suspicion of infection.       • Urine Culture 06/27/2023 50,000-59,000 cfu/ml   Final    Mixed Contaminants X3   • Hepatitis B Surface Ag 06/27/2023 Non-reactive  Non-Reactive Final   • WBC 06/27/2023 7.93  4.31 - 10.16 Thousand/uL Final   • RBC 06/27/2023 4.37  3.81 - 5.12 Million/uL Final   • Hemoglobin 06/27/2023 13.0  11.5 - 15.4 g/dL Final   • Hematocrit 06/27/2023 37.5  34.8 - 46.1 % Final   • MCV 06/27/2023 86  82 - 98 fL Final   • MCH 06/27/2023 29.7  26.8 - 34.3 pg Final   • MCHC 06/27/2023 34.7  31.4 - 37.4 g/dL Final   • RDW 06/27/2023 12.6  11.6 - 15.1 % Final   • MPV 06/27/2023 9.7  8.9 - 12.7 fL Final   • Platelets 85/46/9359 237  149 - 390 Thousands/uL Final   • nRBC 06/27/2023 0  /100 WBCs Final   • Neutrophils Relative 06/27/2023 72  43 - 75 % Final   • Immat GRANS % 06/27/2023 0  0 - 2 % Final   • Lymphocytes Relative 06/27/2023 21  14 - 44 % Final   • Monocytes Relative 06/27/2023 6  4 - 12 % Final   • Eosinophils Relative 06/27/2023 1  0 - 6 % Final   • Basophils Relative 06/27/2023 0  0 - 1 % Final   • Neutrophils Absolute 06/27/2023 5.74  1.85 - 7.62 Thousands/µL Final   • Immature Grans Absolute 06/27/2023 0.02  0.00 - 0.20 Thousand/uL Final   • Lymphocytes Absolute 06/27/2023 1.67  0.60 - 4.47 Thousands/µL Final   • Monocytes Absolute 06/27/2023 0.44  0.17 - 1.22 Thousand/µL Final   • Eosinophils Absolute 06/27/2023 0.04  0.00 - 0.61 Thousand/µL Final   • Basophils Absolute 06/27/2023 0.02  0.00 - 0.10 Thousands/µL Final   • Rubella IgG Quant 06/27/2023 29.3  >14.9 IU/mL Final   • Syphilis Total Antibody 06/27/2023 Non-reactive Non-Reactive Final    No serological evidence of infection with T. pallidum. Early or incubating syphilis infection cannot be excluded. Consider repeat testing based on clinical suspicion.    • Hep B S Ab 06/27/2023 17.80  3-500 mIU/mL mIU/mL Final    Protective Immunity: Hep B Surface Antibody >= 10 mIu/ml (Traceable to [facility] Reference Preparation)   Initial Prenatal on 06/27/2023   Component Date Value Ref Range Status   • Color, UA 06/27/2023 Yellow   Final   • Clarity, UA 06/27/2023 Clear   Final   • Specific Gravity, UA 06/27/2023 1.022  1.003 - 1.030 Final   • pH, UA 06/27/2023 6.0  4.5, 5.0, 5.5, 6.0, 6.5, 7.0, 7.5, 8.0 Final   • Leukocytes, UA 06/27/2023 Negative  Negative Final   • Nitrite, UA 06/27/2023 Negative  Negative Final   • Protein, UA 06/27/2023 Negative  Negative mg/dl Final   • Glucose, UA 06/27/2023 Negative  Negative mg/dl Final   • Ketones, UA 06/27/2023 Negative  Negative mg/dl Final   • Urobilinogen, UA 06/27/2023 <2.0  <2.0 mg/dl mg/dl Final   • Bilirubin, UA 06/27/2023 Negative  Negative Final   • Occult Blood, UA 06/27/2023 Negative  Negative Final   Appointment on 06/01/2023   Component Date Value Ref Range Status   • HCG, Quant 06/01/2023 58,148 (H)  0 - 5 mIU/mL Final   Appointment on 05/25/2023   Component Date Value Ref Range Status   • HCG, Quant 05/25/2023 16,638 (H)  0 - 5 mIU/mL Final   Lab on 05/18/2023   Component Date Value Ref Range Status   • HCG, Quant 05/18/2023 1,427 (H)  0 - 5 mIU/mL Final   Lab on 05/16/2023   Component Date Value Ref Range Status   • ABO Grouping 05/16/2023 O   Final   • Rh Factor 05/16/2023 Positive   Final   • Antibody Screen 05/16/2023 Negative   Final   • Specimen Expiration Date 05/16/2023 [id number]   Final   • HCG, Quant 05/16/2023 540 (H)  0 - 5 mIU/mL Final   • Progesterone 05/16/2023 24.90  See Comment ng/mL Final    PROGESTERONE:     Non Pregnant Females:  Mid-Follicular Phase 0.13-4.91 ng/mL   Mid-Luteal Phase 5.16-18.56 ng/mL Postmenopausal Females <0.78 ng/mL     Pregnant Females:   1st trimester 4.73-50.74 ng/mL   2nd trimester 19.41-45.30 ng/mL    Note: Normal ranges do not apply to patients who are transgender, non-binary, or whose legal sex, sex at birth, and gender identity differ.    Appointment on 03/23/2023   Component Date Value Ref Range Status   • HCG, Quant 03/23/2023 <2  <=6 mIU/mL Final   Admission on 03/19/2023, Discharged on 03/20/2023   Component Date Value Ref Range Status   • WBC 03/19/2023 12.68 (H)  4.31 - 10.16 Thousand/uL Final   • RBC 03/19/2023 4.66  3.81 - 5.12 Million/uL Final   • Hemoglobin 03/19/2023 13.6  11.5 - 15.4 g/dL Final   • Hematocrit 03/19/2023 41.4  34.8 - 46.1 % Final   • MCV 03/19/2023 89  82 - 98 fL Final   • MCH 03/19/2023 29.2  26.8 - 34.3 pg Final   • MCHC 03/19/2023 32.9  31.4 - 37.4 g/dL Final   • RDW 03/19/2023 12.8  11.6 - 15.1 % Final   • MPV 03/19/2023 8.7 (L)  8.9 - 12.7 fL Final   • Platelets 63/75/6601 257  149 - 390 Thousands/uL Final   • nRBC 03/19/2023 0  /100 WBCs Final   • Neutrophils Relative 03/19/2023 81 (H)  43 - 75 % Final   • Immat GRANS % 03/19/2023 0  0 - 2 % Final   • Lymphocytes Relative 03/19/2023 14  14 - 44 % Final   • Monocytes Relative 03/19/2023 5  4 - 12 % Final   • Eosinophils Relative 03/19/2023 0  0 - 6 % Final   • Basophils Relative 03/19/2023 0  0 - 1 % Final   • Neutrophils Absolute 03/19/2023 10.12 (H)  1.85 - 7.62 Thousands/µL Final   • Immature Grans Absolute 03/19/2023 0.04  0.00 - 0.20 Thousand/uL Final   • Lymphocytes Absolute 03/19/2023 1.82  0.60 - 4.47 Thousands/µL Final   • Monocytes Absolute 03/19/2023 0.62  0.17 - 1.22 Thousand/µL Final   • Eosinophils Absolute 03/19/2023 0.05  0.00 - 0.61 Thousand/µL Final   • Basophils Absolute 03/19/2023 0.03  0.00 - 0.10 Thousands/µL Final   • Sodium 03/19/2023 137  135 - 147 mmol/L Final   • Potassium 03/19/2023 3.8  3.5 - 5.3 mmol/L Final   • Chloride 03/19/2023 104  96 - 108 mmol/L Final   • CO2 03/19/2023 26  21 - 32 mmol/L Final   • ANION GAP 03/19/2023 7  4 - 13 mmol/L Final   • BUN 03/19/2023 17  5 - 25 mg/dL Final   • Creatinine 03/19/2023 0.82  0.60 - 1.30 mg/dL Final    Standardized to IDMS reference method   • Glucose 03/19/2023 96  65 - 140 mg/dL Final    If the patient is fasting, the ADA then defines impaired fasting glucose as > 100 mg/dL and diabetes as > or equal to 123 mg/dL. Specimen collection should occur prior to Sulfasalazine administration due to the potential for falsely depressed results. Specimen collection should occur prior to Sulfapyridine administration due to the potential for falsely elevated results.    • Calcium 03/19/2023 9.2  8.4 - 10.2 mg/dL Final   • eGFR 03/19/2023 99  ml/min/1.73sq m Final   • ABO Grouping 03/19/2023 O   Final   • Rh Factor 03/19/2023 Positive   Final   • HCG, Quant 03/19/2023 14 (H)  0 - 11.6 mIU/mL Final   • EXT Preg Test, Ur 03/19/2023 Negative   Final   • Control 03/19/2023 Valid   Final   • Color, UA 03/19/2023 Yellow   Final   • Clarity, UA 03/19/2023 Clear   Final   • pH, UA 03/19/2023 5.5  4.5 - 8.0 Final   • Leukocytes, UA 03/19/2023 Negative  Negative Final   • Nitrite, UA 03/19/2023 Negative  Negative Final   • Protein, UA 03/19/2023 Trace (A)  Negative mg/dl Final   • Glucose, UA 03/19/2023 Negative  Negative mg/dl Final   • Ketones, UA 03/19/2023 Negative  Negative mg/dl Final   • Urobilinogen, UA 03/19/2023 0.2  0.2, 1.0 E.U./dl E.U./dl Final   • Bilirubin, UA 03/19/2023 Negative  Negative Final   • Occult Blood, UA 03/19/2023 Large (A)  Negative Final   • Specific Gravity, UA 03/19/2023 >=1.030  1.003 - 1.030 Final   • RBC, UA 03/19/2023 Innumerable (A)  None Seen, 2-4 /hpf Final   • WBC, UA 03/19/2023 4-10 (A)  None Seen, 2-4, 5-60 /hpf Final   • Epithelial Cells 03/19/2023 None Seen  None Seen, Occasional /hpf Final   • Bacteria, UA 03/19/2023 Occasional  None Seen, Occasional /hpf Final   • Urine Culture 03/19/2023 No Growth <1000 cfu/mL   Final   Appointment on 02/06/2023   Component Date Value Ref Range Status   • AntiThrombIN III Activity 02/06/2023 93  92 - 136 % of Normal Final   • Factor V Leiden 02/06/2023 Comment   Final    Comment: Result: c.1601G>A ([id number]) - Not Detected  This result is not associated with an increased risk for venous  thromboembolism. See Additional Clinical Information and  Comments. Additional Clinical Information:  Venous thromboembolism is a multifactorial disease influenced by  genetic, environmental, and circumstantial risk factors. The c.1601G>A  (p. [id number]) variant in the F5 gene, commonly referred to as Factor  V Leiden, is a genetic risk factor for venous thromboembolism. Heterozygous carriers of this variant have a 6- to 8-fold increased  risk for venous thromboembolism. Individuals homozygous for this  variant (ie, with a copy of the variant on each chromosome) have an  approximately 80-fold increased risk for venous thromboembolism. Individuals who carry both a c.*97G>A variant in the F2 gene and  Factor V Leiden have an approximately 20-fold increased risk for  venous thromboembolism. Risks are likely to be even higher in more  complex genotype combinations involving                            the F2 c.*97G>A variant and  Factor V Leiden (PMID: [id number]). Additional risk factors include but  are not limited to: deficiency of protein C, protein S, or  antithrombin III, age, male sex, personal or family history of deep  vein thromboembolism, smoking, surgery, prolonged immobilization,  malignant neoplasm, tamoxifen treatment, raloxifene treatment, oral  contraceptive use, hormone replacement therapy, and pregnancy. Management of thrombotic risk and thrombotic events should follow  established guidelines and fit the clinical circumstance. This result  cannot predict the occurrence or recurrence of a thrombotic event.   Comment:  Genetic counseling is recommended to discuss the potential clinical  implications of positive results, as well as recommendations for  testing family members. Genetic Coordinators are available for health care providers to  discuss results at [phone] ([phone]). Test Details:  Variant Analyzed: c.1601G>A (p. [id number]), referred to as Factor                            V  Leiden  Methods/Limitations:  DNA analysis of the F5 gene (NM_000130.5) was performed by PCR  amplification followed by restriction enzyme analysis. The diagnostic  sensitivity is >99%. Results must be combined with clinical  information for the most accurate interpretation. Molecular-based  testing is highly accurate, but as in any laboratory test, diagnostic  errors may occur. False positive or false negative results may occur  for reasons that include genetic variants, blood transfusions, bone  marrow transplantation, somatic or tissue-specific mosaicism,  mislabeled samples, or erroneous representation of family  relationships. This test was developed and its performance characteristics  determined by Gabrielle Maddox. It has not been cleared or approved by the  Food and Drug Administration. References:  [facility], Rossy Lowery; [facility] Professional  Practice and Guidelines Committee. Addendum: [facility] consensus statement on factor                            V Leiden mutation  testing. [employer]. 2021 Mar 5. doi: 10.1038/i74329-702-31306-t. PMID: 54818550. Micky Manley. Factor V Leiden Thrombophilia. 1999 May 14  (Updated 2018 Jan 4). In: Gen Paige, Wilbur FARFAN, Mariel RA, et al.,  editors. Alison(R) ([employer]). [address] [place] [address] ([facility]): [facility], [address] [place] [address]; [phone]. Available from:  [employer].Lisandro Amado Elna Kurk CS;  [facility] Laboratory  Committee.  Venous thromboembolism  laboratory testing (factor V Leiden and factor II c.*97G>A), 2018  update: a technical standard of the [address] [place] [address] ([facility]). [employer]. 2018 Dec;20(12):4532-2990.  doi: 47.4073/[id number]-N. Epub 2018 Oct 5. PMID: 15171742. Jonathan Mcdonald, PhD, Iram Dye, PhD  Tasha Marcial, PhD, Newton Keith, PhD, Artemio Little, PhD, Irina Lopez, PhD, Hector Bose, PhD, mW Michelle, PhD,                            [facility]   There may be more visits with results that are not included. Imaging:  No relevant imaging to review       Please note: This report has been generated by a voice recognition software system. Therefore there may be syntax, spelling, and/or grammatical errors. Please call if you have any questions.

## 2023-08-12 ENCOUNTER — NURSE TRIAGE (OUTPATIENT)
Dept: OTHER | Facility: OTHER | Age: 28
End: 2023-08-12

## 2023-08-12 NOTE — TELEPHONE ENCOUNTER
Regardin wks pregnant/ pain on left side  ----- Message from Mendel Gibbs sent at 2023  1:18 PM EDT -----  " I'm about 18 wks pregnant and my  and I had intercourse last night and now I have a pain on my left side.  I'm not sure if it's anything I just want to be cautious."

## 2023-08-12 NOTE — TELEPHONE ENCOUNTER
Patient calling in with complaints of constant left lower abdominal pain that radiates to her hip. Patient stated the pain started shortly after having intercourse yesterday morning. She stated the pain is a dull ache, 3-4/10. She stated she has been using a heating pad and resting but pain still persists. Patient also stated her blood pressure was elevated today at 150/72 and 137/7. Patient stated she has been taking her blood pressures randomly since becoming pregnant and that she normally is in low 110's/70's. Patient stated she has been getting frequent headaches, last headache was 3 days ago for which she takes her contacts out, takes Tylenol, lays in a dark room and applies ice to her head. Patient denies any visual changes, upper abdominal pain, vaginal bleeding or leakage of fluid. On call provider contacted, stated patient should continue to monitor pain at home as it is mild at this time. Instructed patient to continue to rest, take Tylenol as needed and rest to help with discomfort. Informed her that provider stated BP's 160/110 or higher would be cause for concern. Instructed patient to call back with further questions, concerns or worsening symptoms. Patient verbalized understanding.

## 2023-08-12 NOTE — TELEPHONE ENCOUNTER
Reason for Disposition  • Mild abdominal pain    Answer Assessment - Initial Assessment Questions  1. LOCATION: "Where does it hurt?"       Left side, LLQ to hip     2. RADIATION: "Does the pain shoot anywhere else?" (e.g., chest, back, shoulder)      Radiate to hip     3. ONSET: "When did the pain begin?" (e.g., minutes, hours or days ago)       Yesterday morning     4. ONSET: "Gradual or sudden onset?"      Sudden    5. PATTERN "Does the pain come and go, or has it been constant since it started?"        Constant     6. SEVERITY: "How bad is the pain?" "What does it keep you from doing?"  (e.g., Scale 1-10; mild, moderate, or severe)    - MILD (1-3): doesn't interfere with normal activities, abdomen soft and not tender to touch     - MODERATE (4-7): interferes with normal activities or awakens from sleep, tender to touch     - SEVERE (8-10): excruciating pain, doubled over, unable to do any normal activities      Dull ache, 3-4/10    7. RECURRENT SYMPTOM: "Have you ever had this type of stomach pain before?" If Yes, ask: "When was the last time?" and "What happened that time?"       Denies     8. CAUSE: "What do you think is causing the stomach pain?"      Eliza yesterday morning     9. RELIEVING/AGGRAVATING FACTORS: "What makes it better or worse?" (e.g., antacids, bowel movement, movement)       Denies        Tried using a heating pad and resting but it hasn't gone away     10. OTHER SYMPTOMS: "Has there been any vaginal bleeding, fever, vomiting, diarrhea, or urine problems?"        BP- 150/72 this afternoon, BP now- 137/77         Has been getting frequent headaches- last one 3 days ago        Denies vaginal bleeding, leakage of fluid, contractions        Last BM today    11.  JOSE ELIAS: "What date are you expecting to deliver?"        1/21/24    Protocols used: PREGNANCY - ABDOMINAL PAIN LESS THAN 20 WEEKS EGA-ADULT-

## 2023-08-14 LAB
APTT SCREEN TO CONFIRM RATIO: 1.3 RATIO (ref 0–1.34)
CONFIRM APTT/NORMAL: 37.6 SEC (ref 0–47.6)
LA PPP-IMP: NORMAL
SCREEN APTT: 36.6 SEC (ref 0–43.5)
SCREEN DRVVT: 35 SEC (ref 0–47)
THROMBIN TIME: 15.8 SEC (ref 0–23)

## 2023-08-15 ENCOUNTER — ROUTINE PRENATAL (OUTPATIENT)
Dept: OBGYN CLINIC | Facility: MEDICAL CENTER | Age: 28
End: 2023-08-15

## 2023-08-15 ENCOUNTER — APPOINTMENT (OUTPATIENT)
Dept: LAB | Facility: MEDICAL CENTER | Age: 28
End: 2023-08-15
Payer: COMMERCIAL

## 2023-08-15 VITALS — BODY MASS INDEX: 28.46 KG/M2 | SYSTOLIC BLOOD PRESSURE: 118 MMHG | WEIGHT: 179 LBS | DIASTOLIC BLOOD PRESSURE: 72 MMHG

## 2023-08-15 DIAGNOSIS — D68.52 HETEROZYGOUS FOR PROTHROMBIN G20210A MUTATION (HCC): ICD-10-CM

## 2023-08-15 DIAGNOSIS — D68.52 PROTHROMBIN GENE MUTATION (HCC): ICD-10-CM

## 2023-08-15 DIAGNOSIS — Z3A.17 17 WEEKS GESTATION OF PREGNANCY: Primary | ICD-10-CM

## 2023-08-15 DIAGNOSIS — Z3A.17 17 WEEKS GESTATION OF PREGNANCY: ICD-10-CM

## 2023-08-15 LAB
B2 GLYCOPROT1 IGA SERPL IA-ACNC: 0.5
B2 GLYCOPROT1 IGG SERPL IA-ACNC: 1.5
B2 GLYCOPROT1 IGM SERPL IA-ACNC: 5.6
CARDIOLIPIN IGA SER IA-ACNC: 1
CARDIOLIPIN IGG SER IA-ACNC: 2.1
CARDIOLIPIN IGM SER IA-ACNC: 4.9

## 2023-08-15 PROCEDURE — 82105 ALPHA-FETOPROTEIN SERUM: CPT

## 2023-08-15 PROCEDURE — PNV: Performed by: OBSTETRICS & GYNECOLOGY

## 2023-08-15 PROCEDURE — 36415 COLL VENOUS BLD VENIPUNCTURE: CPT

## 2023-08-15 NOTE — PROGRESS NOTES
Chanelle Duron is a 33y.o. year old  at 17w2d for routine prenatal visit.   + FM, no vaginal bleeding, contractions, or LOF  Complaints: Yes - round ligament pain , supportive care discussed   Most recent ultrasound and labs reviewed.   Order for MSAFP given  prothrombin gene mutation - no personal hx of DVT / plan for postpartum anticoagulation, followed by [facility] and heme

## 2023-08-18 LAB
2ND TRIMESTER 4 SCREEN SERPL-IMP: NORMAL
AFP ADJ MOM SERPL: 1
AFP INTERP AMN-IMP: NORMAL
AFP INTERP SERPL-IMP: NORMAL
AFP INTERP SERPL-IMP: NORMAL
AFP SERPL-MCNC: 36.3 NG/ML
AGE AT DELIVERY: 27.3 YR
GA METHOD: NORMAL
GA: 17.3 WEEKS
IDDM PATIENT QL: NO
MULTIPLE PREGNANCY: NO
NEURAL TUBE DEFECT RISK FETUS: NORMAL %

## 2023-09-05 NOTE — PROGRESS NOTES
Please refer to the [facility] ultrasound report in Ob Procedures for additional information regarding today's visit

## 2023-09-07 ENCOUNTER — ROUTINE PRENATAL (OUTPATIENT)
Dept: PERINATAL CARE | Facility: OTHER | Age: 28
End: 2023-09-07
Payer: COMMERCIAL

## 2023-09-07 VITALS
BODY MASS INDEX: 29.41 KG/M2 | HEART RATE: 81 BPM | SYSTOLIC BLOOD PRESSURE: 122 MMHG | DIASTOLIC BLOOD PRESSURE: 68 MMHG | WEIGHT: 187.4 LBS | HEIGHT: 67 IN

## 2023-09-07 DIAGNOSIS — D68.52 HETEROZYGOUS FOR PROTHROMBIN G20210A MUTATION (HCC): ICD-10-CM

## 2023-09-07 DIAGNOSIS — O99.112 THROMBOPHILIA AFFECTING PREGNANCY IN SECOND TRIMESTER, ANTEPARTUM (HCC): ICD-10-CM

## 2023-09-07 DIAGNOSIS — Z36.3 ENCOUNTER FOR ANTENATAL SCREENING FOR MALFORMATIONS: Primary | ICD-10-CM

## 2023-09-07 DIAGNOSIS — Z36.86 ENCOUNTER FOR ANTENATAL SCREENING FOR CERVICAL LENGTH: ICD-10-CM

## 2023-09-07 DIAGNOSIS — D68.59 THROMBOPHILIA AFFECTING PREGNANCY IN SECOND TRIMESTER, ANTEPARTUM (HCC): ICD-10-CM

## 2023-09-07 DIAGNOSIS — Z3A.20 20 WEEKS GESTATION OF PREGNANCY: ICD-10-CM

## 2023-09-07 PROCEDURE — 99213 OFFICE O/P EST LOW 20 MIN: CPT | Performed by: OBSTETRICS & GYNECOLOGY

## 2023-09-07 PROCEDURE — 76805 OB US >/= 14 WKS SNGL FETUS: CPT | Performed by: OBSTETRICS & GYNECOLOGY

## 2023-09-07 PROCEDURE — 76817 TRANSVAGINAL US OBSTETRIC: CPT | Performed by: OBSTETRICS & GYNECOLOGY

## 2023-09-07 NOTE — LETTER
September 7, 2023     Nirav Hollins, [address] [place] [address],[address]  [place]    Patient: Tamy Guzman   YOB: 1995   Date of Visit: 9/7/2023       Dear Dr. Jacquelin Dorado: Thank you for referring Tamy Guzman to me for evaluation. Below are my notes for this consultation. If you have questions, please do not hesitate to call me. I look forward to following your patient along with you.          Sincerely,        Lb Waldron MD        CC: No Recipients    Lb Waldron MD  9/5/2023  6:16 PM  Sign when Signing Visit  Please refer to the [facility] ultrasound report in Ob Procedures for additional information regarding today's visit

## 2023-09-11 ENCOUNTER — ROUTINE PRENATAL (OUTPATIENT)
Dept: OBGYN CLINIC | Facility: MEDICAL CENTER | Age: 28
End: 2023-09-11

## 2023-09-11 ENCOUNTER — OFFICE VISIT (OUTPATIENT)
Dept: FAMILY MEDICINE CLINIC | Facility: CLINIC | Age: 28
End: 2023-09-11
Payer: COMMERCIAL

## 2023-09-11 VITALS
SYSTOLIC BLOOD PRESSURE: 114 MMHG | HEIGHT: 67 IN | OXYGEN SATURATION: 99 % | TEMPERATURE: 97.6 F | HEART RATE: 74 BPM | WEIGHT: 187.8 LBS | DIASTOLIC BLOOD PRESSURE: 64 MMHG | BODY MASS INDEX: 29.47 KG/M2

## 2023-09-11 VITALS — BODY MASS INDEX: 29.91 KG/M2 | SYSTOLIC BLOOD PRESSURE: 118 MMHG | DIASTOLIC BLOOD PRESSURE: 70 MMHG | WEIGHT: 188.1 LBS

## 2023-09-11 DIAGNOSIS — B07.0 PLANTAR WART: ICD-10-CM

## 2023-09-11 DIAGNOSIS — H61.23 BILATERAL IMPACTED CERUMEN: ICD-10-CM

## 2023-09-11 DIAGNOSIS — B07.8 COMMON WART: Primary | ICD-10-CM

## 2023-09-11 DIAGNOSIS — Z34.02 ENCOUNTER FOR SUPERVISION OF NORMAL FIRST PREGNANCY IN SECOND TRIMESTER: Primary | ICD-10-CM

## 2023-09-11 DIAGNOSIS — Z3A.21 21 WEEKS GESTATION OF PREGNANCY: ICD-10-CM

## 2023-09-11 DIAGNOSIS — R32 URINARY INCONTINENCE, UNSPECIFIED TYPE: Primary | ICD-10-CM

## 2023-09-11 DIAGNOSIS — D68.52 HETEROZYGOUS FOR PROTHROMBIN G20210A MUTATION (HCC): ICD-10-CM

## 2023-09-11 PROCEDURE — 69209 REMOVE IMPACTED EAR WAX UNI: CPT | Performed by: FAMILY MEDICINE

## 2023-09-11 PROCEDURE — PNV: Performed by: OBSTETRICS & GYNECOLOGY

## 2023-09-11 PROCEDURE — 99213 OFFICE O/P EST LOW 20 MIN: CPT | Performed by: FAMILY MEDICINE

## 2023-09-11 NOTE — PATIENT INSTRUCTIONS
Hopefully, wart will fall off over the next 3 weeks. May need to be frozen another time. For plantar wart, suggest obtaining Mediplast and a pumice stone. Junaid Good is a brand name for salicylic acid plaster. Congratulations on pregnancy! Return as needed.

## 2023-09-11 NOTE — PROGRESS NOTES
Chief Complaint   Patient presents with   • Other     Wart on elbow   • Ear Fullness     Both ears-left more than right-  Patient is 21 weeks pregnant        HPI   Here because her ears seem blocked and she has a wart on her elbow. Changed her name to her  name and is 21 weeks pregnant. Has been seen by  because of her family history of prothrombin mutation. Currently on an aspirin. Was on Adderall which she stopped when she got pregnant. Functioning okay she admits to reluctantly. Here with a wart on her right elbow. Also feels that her ears are blocked. 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 After pre-requisites at [employer].  to Charisma Jimenes since 8/20. Back yard wedding. Came from [facility] when she was 7. Charisma Jimenes works for the [employer]. Enjoys volleyball. The following portions of the patient's history were reviewed and updated as appropriate: allergies, current medications, past family history, past medical history, past social history, past surgical history and problem list.      Review of Systems       /64 (BP Location: Left arm, Patient Position: Sitting, Cuff Size: Standard)   Pulse 74   Temp 97.6 °F (36.4 °C) (Temporal)   Ht 5' 6.5" (1.689 m)   Wt 85.2 kg (187 lb 12.8 oz)   LMP 04/16/2023 (Exact Date)   SpO2 99%   BMI 29.86 kg/m²      Physical Exam   Wart present on the right elbow. Wax present in both ear canals left greater than right.         Lesion Destruction    Date/Time: 9/11/2023 3:40 PM    Performed by: Heriberto Fuentes MD  Authorized by: Heriberto Fuentes MD  Universal Protocol:  Consent: Verbal consent obtained. Patient understanding: patient states understanding of the procedure being performed      Procedure Details - Lesion Destruction:     Number of Lesions:  1  Lesion 1:     Body area:  Upper extremity    Upper extremity location:  R elbow    Initial size (mm):  6    Malignancy: benign hyperkeratotic lesion      Destruction method: cryotherapy    Ear cerumen removal    Date/Time: 9/11/2023 3:40 PM    Performed by: Heriberto Fuentes MD  Authorized by: Heriberto Fuentes MD  Universal Protocol:  Consent: Verbal consent obtained. Patient understanding: patient states understanding of the procedure being performed      Patient location:  Clinic  Procedure details:     Location:  L ear and R ear    Procedure type: irrigation only    Post-procedure details:     Complication:  None    Hearing quality:  Normal    Patient tolerance of procedure: Tolerated well, no immediate complications                    Current Outpatient Medications:   •  aspirin 81 mg chewable tablet, Chew 1 tablet (81 mg total) daily Chew or swallow one tablets daily. Stop at 36 weeks. , Disp: 30 tablet, Rfl: 5  •  Prenatal Vit w/By-Zezdukusj-ZP (PNV PO), Take 1 tablet by mouth in the morning, Disp: , Rfl:      No problem-specific Assessment & Plan notes found for this encounter. Diagnoses and all orders for this visit:    Common wart    Plantar wart    Bilateral impacted cerumen    Other orders  -     Lesion Destruction  -     Ear cerumen removal        Patient Instructions   Hopefully, wart will fall off over the next 3 weeks. May need to be frozen another time. For plantar wart, suggest obtaining Mediplast and a pumice stone. Tita Ruiz is a brand name for salicylic acid plaster. Congratulations on pregnancy! Return as needed.

## 2023-09-11 NOTE — PROGRESS NOTES
Routine Prenatal Visit  [facility] [address] [place] [address]    Assessment/Plan:  Ruby Robledo is a 33y.o. year old  at 20w2d who presents for routine prenatal visit. 1. Encounter for supervision of normal first pregnancy in second trimester    2. 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 21 weeks gestation of pregnancy          Subjective:     CC: Prenatal care    Lang Mcmillan is a 33 y.o.  female who presents for routine prenatal care at 21w1d. Pregnancy ROS: no leakage of fluid, pelvic pain, or vaginal bleeding.  + fetal movement. Reports cramping today, no bleeding    The following portions of the patient's history were reviewed and updated as appropriate: allergies, current medications, past family history, past medical history, obstetric history, gynecologic history, past social history, past surgical history and problem list.      Objective:  /70   Wt 85.3 kg (188 lb 1.6 oz)   LMP 2023 (Exact Date)   BMI 29.91 kg/m²   Pregravid Weight/BMI: 77.1 kg (170 lb) (BMI 27.03)  Current Weight: 85.3 kg (188 lb 1.6 oz)   Total Weight Gain: 8.21 kg (18 lb 1.6 oz)   Pre- Vitals    Flowsheet Row Most Recent Value   Prenatal Assessment    Fetal Heart Rate 142   Movement Present   Prenatal Vitals    Blood Pressure 118/70   Weight - Scale 85.3 kg (188 lb 1.6 oz)   Urine Albumin/Glucose    Dilation/Effacement/Station    Vaginal Drainage    Edema    LLE Edema None   RLE Edema None   Facial Edema None           General: Well appearing, no distress  Respiratory: Unlabored breathing  Cardiovascular: Regular rate. Abdomen: Soft, gravid, nontender  Fundal Height: Appropriate for gestational age. Extremities: Warm and well perfused. Non tender.

## 2023-09-16 DIAGNOSIS — Z3A.13 13 WEEKS GESTATION OF PREGNANCY: ICD-10-CM

## 2023-09-18 RX ORDER — ASPIRIN 81 MG/1
81 TABLET, CHEWABLE ORAL DAILY
Qty: 30 TABLET | Refills: 0 | Status: SHIPPED | OUTPATIENT
Start: 2023-09-18

## 2023-09-22 NOTE — PROGRESS NOTES
PT Evaluation     Today's date: 2023  Patient name: Stephen Arita  : 1995  MRN: [id number]  Referring provider: Kendra Dorman MD  Dx:   Encounter Diagnosis     ICD-10-CM    1. Urinary incontinence, unspecified type  R32 Ambulatory Referral to Physical Therapy      2. Abdominal weakness  R19.8           Start Time: 1200  Stop Time: 1300  Total time in clinic (min): 60 minutes    Assessment  Assessment details: Og Arnold is a 33y.o. year old female with complaints of urinary incontinence and requesting education for labor and delivery. The following impairments were found on evaluation: poor bladder habits . These impairments contribute to the following functional limitations: decreased tolerance to activity and functional mobility; and the following disability: decreased quality of life and increased risk of UTI. Og Arnold  is a good candidate for therapy and would benefit from skilled physical therapy to address the above impairments in order to allow the patient to achieve below goals and return to her prior level of function and maintain function t/o remainder of pregnancy. Patient education provided on PFM anatomy and function, toilet posture, toilet mechanics  and healthy bladder habits. Patient educated on diagnosis, plan of care and prognosis. Domitila Livingston are in agreement with recommended plan of care, goals for therapy and demonstrates motivation for active participation in proposed plan of care. Thank you Dr. Kerry Hahn for this referral!    Impairments: abnormal coordination, abnormal muscle firing, abnormal muscle tone, abnormal or restricted ROM, activity intolerance, impaired physical strength and poor body mechanics  Barriers to therapy: none  Understanding of Dx/Px/POC: good   Prognosis: good    Goals  STG to be completed in 8 weeks from 2023:  1.  Og Arnold will be independent with initial home exercise program.   2. Og Arnold will demonstrate ability to contract, relax and actively lengthen PFM. 3. Veto Samuels will report 50% improvement in UI.   4. Maxwell will complete bladder diary activity. 11. Veto Samuels will participate in internal PFM exam.   10. Veto Samuels will participate in birth preparation education. LTG to be completed in 12 weeks from 9/26/2023 or upon discharge from PFPT:  1. Veto Samuels will be independent with advanced home exercise program for self-management of symptoms. 2. Veto Samuels will demonstrate ability to appropriately activate deep core muscles with functional mobility tasks. 3. Maxwell report 90% improvement or better in UI.   4. Veto Samuels will report not needing to change clothes due to UI. Plan  Patient would benefit from: skilled physical therapy  Planned modality interventions: cryotherapy, biofeedback, TENS, thermotherapy: hydrocollator packs and ultrasound  Planned therapy interventions: abdominal trunk stabilization, activity modification, ADL retraining, ADL training, balance, balance/weight bearing training, behavior modification, body mechanics training, breathing training, coordination, flexibility, functional ROM exercises, graded activity, gait training, graded exercise, graded motor, home exercise program, IADL retraining, IASTM, joint mobilization, kinesiology taping, manual therapy, massage, motor coordination training, nerve gliding, neuromuscular re-education, patient education, postural training, strengthening, stretching, therapeutic activities and therapeutic exercise  Frequency: 1x week  Duration in weeks: 10  Plan of Care beginning date: 9/26/2023  Plan of Care expiration date: 12/19/2023  Treatment plan discussed with: patient, PTA and referring physician        PT Pelvic Floor Subjective:   History of Present Illness:   Erlin Aaron is a 33 y.o. female who prefers she/her pronouns. They present to pelvic floor physical therapy with the primary complaint of UI. They are referred to OPPT by Dr. Elba Mcclelland.   Maxwell reports she is having new onset urinary incontinence began during first trimester. She also wants to learn some strategies to make delivery smooth as possible. This is her first pregnancy. She is currently 23 weeks pregnant with baby boy due Jan 21.     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     Life stress severity: moderate (reads for relaxation )  Hand dominance:  Right  Diet and Exercise:    Diet:balanced nutrition    Walking every day 1 mile - was more active   Was more active prior to pregnancy   Co-morbidities:    Patient Active Problem List:     Attention deficit hyperactivity disorder (ADHD)     Prothrombin gene mutation ([address])     21 weeks gestation of pregnancy     Heterozygous for prothrombin [id number] mutation ([address])     Thrombophilia affecting pregnancy in second trimester, antepartum ([address])  OB/ gyn History    Gestational History:     Prior Pregnancy: Yes      Number of prior pregnancies: 2 (1 miscarriage)    Number of term pregnancies: 1    Menstrual History:      Menstrual irregularities regular menses    Painful periods:  Difficulty managing menstrual pain (painful but tolerable )  Bladder Function:     Voiding Difficulties negative for: urgency (more urgency at night ), frequent urination, hesitancy, straining and incomplete emptying      Voiding Difficulties comments:     Voiding frequency: every 1-2 hours and every 3-4 hours    Urinary leakage: urine leakage (does feel when she leaks )    Urinary leakage aggravated by: coughing, sneezing, exercise (sometimes when walking ), post-void dribble and laughing    Urinary leakage not aggravated by: bending, standing up, walking to the bathroom, hearing running water, key-in-the-door syndrome and seeing a toilet    Nocturia (episodes per night): 1 and 2 (during first trimester 3-4 )    Painful urination: No      Intake (ounces): Intake (ounces) comment: Water 54oz   Tea (ice) 1 cup (not every day)  Lemonade 1 cup (not every day)  At night more thirsty   Coffee- 1 cup a day   Incontinence Management:     Pads/Diaper Use:  None (needing to change her underwear 1x per day if needed )  Bowel Function:     Bowel frequency: multiple times a day and daily    [place] Stool Scale: type 3 and type 4    Uses "squatty potty": Squatty Potty  Sexual Function:     Sexually Active:  Sexually active    Pain during intercourse: No      Lubrication Use: No  Pt reports everything feels tighter  Feels less interest in sex   Can have discomfort position dependent with tightness   :  Pain:     No pain reported by patient.   Diagnostic Tests:     None    Patient Goals:     Patient goals for therapy:  Fully empty bladder or bowels, improved bladder or bowel function, improved comfort, improved pain management, improved quality of life, improved sleep and return to sport/leisure activities    Other patient goals:  "get stronger for labor, decrease peeing myself"       Objective   Graphical documentation:     Pt reports everything feels tighter  Feels less interest in sex   Can have discomfort position dependent with tightness     to be completed next visit            Precautions: standard       9/26/2023          Visit Number: #1 #2 #3 #4 #5 #6 #7 #8   Outcome Measure:           Patient Ed           PFM anatomy and function KH          Double void  KH          Decrease fluids 2 hours before bed [facility]          Bladder diary  3 copies provided           Perineal massage            Birth preparation                                                        Neuro Re-Ed                                                                                        Ther Ex           Warm-up                                                                                         Ther Activity                                 Manual           PFM exam Modalities

## 2023-09-26 ENCOUNTER — EVALUATION (OUTPATIENT)
Age: 28
End: 2023-09-26
Payer: COMMERCIAL

## 2023-09-26 DIAGNOSIS — R19.8 ABDOMINAL WEAKNESS: ICD-10-CM

## 2023-09-26 DIAGNOSIS — R32 URINARY INCONTINENCE, UNSPECIFIED TYPE: Primary | ICD-10-CM

## 2023-09-26 PROCEDURE — 97530 THERAPEUTIC ACTIVITIES: CPT

## 2023-09-26 PROCEDURE — 97162 PT EVAL MOD COMPLEX 30 MIN: CPT

## 2023-09-28 NOTE — PROGRESS NOTES
Daily Note     Today's date: 10/3/2023  Patient name: Sandra Barbosa  : 1995  MRN: [id number]  Referring provider: Carolyn Nguyen MD  Dx:   Encounter Diagnosis     ICD-10-CM    1. Urinary incontinence, unspecified type  R32       2. Abdominal weakness  R19.8           Start Time: 6307  Stop Time: 1615  Total time in clinic (min): 45 minutes    Subjective: Pt completed bladder diary. She noticed that she does leak with laughing and leaking with bending/squatting. Objective: See treatment diary below    Pelvic Floor Muscle Control  Pelvic Floor Muscle Contraction: debbie to Isolate with cues; breath holding, glutes, adductors substitution  Pelvic Floor Muscle Relaxation: complete  PERF-Power Right: 5/5  PERF-Power Left: 5/5  PERF-Endurance:  10 seconds  PERF-Reps (# to fatigue): 10  PERF- Flicks: 10  Cough Reflex: absent- improve with cuing (absent with laugh)        Assessment: Maxwell Crabtree tolerated today's treatment session well. Patient education provided on preactivation of PFMs, pressure management, fluid intake . [address] completed all TE with good form and no adverse reactions. Pt with good PFM strength. Lacks coordination with cough and laughing. She tends to have breath holding patterns and poor pressure management- improved with education. Price Sierra continues to benefit from skilled OPPT services to address urinary incontinence  and pregnancy/birth prep. Will continue to address functional deficits and focus on progression of POC per patient tolerance. Plan: Continue per plan of care. Progress treatment as tolerated.        Precautions: standard       2023 10/4/2023         Visit Number: #1 #2 #3 #4 #5 #6 #7 #8   Outcome Measure:           Patient Ed           PFM anatomy and function KH          Double void  KH          Decrease fluids 2 hours before bed [facility]          Bladder diary  3 copies provided  Reviewed          Perineal massage            Birth preparation Pressure management   [facility]                                           Neuro Re-Ed           Pre activation of PFM with STS, cough, bending over   [facility]         STS with core activation and exhale  [facility]                                                                 Ther Ex           Warm-up                                                                                         Ther Activity                                 Manual           PFM exam   [facility] - see above                                                      Modalities

## 2023-10-03 ENCOUNTER — OFFICE VISIT (OUTPATIENT)
Age: 28
End: 2023-10-03
Payer: COMMERCIAL

## 2023-10-03 DIAGNOSIS — R32 URINARY INCONTINENCE, UNSPECIFIED TYPE: Primary | ICD-10-CM

## 2023-10-03 DIAGNOSIS — R19.8 ABDOMINAL WEAKNESS: ICD-10-CM

## 2023-10-03 PROCEDURE — 97140 MANUAL THERAPY 1/> REGIONS: CPT

## 2023-10-03 PROCEDURE — 97530 THERAPEUTIC ACTIVITIES: CPT

## 2023-10-05 NOTE — PROGRESS NOTES
Daily Note     Today's date: 10/10/2023  Patient name: Yves Guerrero  : 1995  MRN: [id number]  Referring provider: Donna Bunn MD  Dx:   Encounter Diagnosis     ICD-10-CM    1. Urinary incontinence, unspecified type  R32       2. Abdominal weakness  R19.8           Start Time: 8782  Stop Time: 1200  Total time in clinic (min): 45 minutes    Subjective: Pt reports her bladder is doing better. She is trying to "sip up" she is doing ok with pelvic floor but forgets belly. She thinks she is leaking less- still with leakage if sneeze comes up out of the blue. She is trying to be better with water. She is trying to drink more earlier in the day and less in the evenings. Objective: See treatment diary below      Assessment: Yves Guerrero tolerated today's treatment session well. Patient education provided on birth prep, pressure management, bed mobility. Price Sierra completed all TE with good form and no adverse reactions. Introduced further core activation TE and birth prep education. Price Sierra continues to benefit from skilled OPPT services to address urinary incontinence . Will continue to address functional deficits and focus on progression of POC per patient tolerance. Patient performed Recumbent bike to increase blood flow to the area being treated, prepare the muscles for strength training and stretching, improve overall tolerance to activity, and aerobic endurance. Plan: Continue per plan of care. Progress treatment as tolerated.        Precautions: standard       2023 10/4/2023 10/10/2023        Visit Number: #1 #2 #3 #4 #5 #6 #7 #8   Outcome Measure:           Patient Ed           PFM anatomy and function KH          Double void  KH          Decrease fluids 2 hours before bed [facility]          Bladder diary  3 copies provided  Reviewed          Perineal massage    Handout provided         Birth preparation    Discussed environment- sound machine, music, lighting, smells Pressure management   [facility]  Reviewed         Getting in and out of bed   Reviewed and practiced        Labor positions   Handout provided                    Neuro Re-Ed           Pre activation of PFM with STS, cough, bending over   [facility] Reviewed         STS with core activation and exhale  KH  x10         Cat/cow           carey pose           Quadruped alternating arms                                  Ther Ex           Warm-up    Bike level 1 5 min         pball marches    1 min        pball LAQ   1 min                                                                Ther Activity                                 Manual           PFM exam   KH - see above                                                      Modalities

## 2023-10-09 ENCOUNTER — ROUTINE PRENATAL (OUTPATIENT)
Dept: OBGYN CLINIC | Facility: MEDICAL CENTER | Age: 28
End: 2023-10-09

## 2023-10-09 VITALS — WEIGHT: 195.4 LBS | BODY MASS INDEX: 31.07 KG/M2 | DIASTOLIC BLOOD PRESSURE: 64 MMHG | SYSTOLIC BLOOD PRESSURE: 118 MMHG

## 2023-10-09 DIAGNOSIS — Z3A.25 25 WEEKS GESTATION OF PREGNANCY: ICD-10-CM

## 2023-10-09 DIAGNOSIS — Z34.92 SECOND TRIMESTER PREGNANCY: Primary | ICD-10-CM

## 2023-10-09 DIAGNOSIS — D68.52 HETEROZYGOUS FOR PROTHROMBIN G20210A MUTATION (HCC): ICD-10-CM

## 2023-10-09 DIAGNOSIS — D68.59 THROMBOPHILIA AFFECTING PREGNANCY IN SECOND TRIMESTER, ANTEPARTUM (HCC): ICD-10-CM

## 2023-10-09 DIAGNOSIS — O99.112 THROMBOPHILIA AFFECTING PREGNANCY IN SECOND TRIMESTER, ANTEPARTUM (HCC): ICD-10-CM

## 2023-10-09 PROCEDURE — PNV: Performed by: OBSTETRICS & GYNECOLOGY

## 2023-10-09 NOTE — ASSESSMENT & PLAN NOTE
No personal hx VTE.  Offered antepartum anticoagulation vs surveillance    Plan: Surveillance antepartum, anticoagulation pp  Follows with heme onc as well

## 2023-10-09 NOTE — PROGRESS NOTES
Assessment  33 y.o. [employer] at 25w1d presenting for routine prenatal visit. Plan  Diagnoses and all orders for this visit:    Second trimester pregnancy  25 weeks gestation of pregnancy  - PTL precautions  - Normal movements discussed  - Level 2 reviewed  - 28wk lab slips given  - Discussed Tdap and flu recommendations. 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 [employer] at 25w1d who presents for routine prenatal visit. She is without complaint. She denies contractions, loss of fluid, or vaginal bleeding. She feels regular fetal movements. 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 Appearance: Normal appearance. She is well-developed. She is not ill-appearing, toxic-appearing or diaphoretic. HENT:      Head: Normocephalic and atraumatic. Eyes:      General: No scleral icterus. Right eye: No discharge. Left eye: No discharge.       Conjunctiva/sclera: Conjunctivae normal.   Pulmonary:      Effort: Pulmonary effort is normal. No accessory muscle usage or respiratory distress. Abdominal:      General: There is distension (gravid). Tenderness: There is no abdominal tenderness. There is no guarding or rebound. Skin:     General: Skin is warm and dry. Coloration: Skin is not jaundiced. Findings: No bruising, erythema or rash. Neurological:      Mental Status: She is alert. Psychiatric:         Mood and Affect: Mood normal.         Behavior: Behavior normal.         Thought Content:  Thought content normal.         Judgment: Judgment normal.

## 2023-10-10 ENCOUNTER — OFFICE VISIT (OUTPATIENT)
Age: 28
End: 2023-10-10
Payer: COMMERCIAL

## 2023-10-10 DIAGNOSIS — R32 URINARY INCONTINENCE, UNSPECIFIED TYPE: Primary | ICD-10-CM

## 2023-10-10 DIAGNOSIS — R19.8 ABDOMINAL WEAKNESS: ICD-10-CM

## 2023-10-10 PROCEDURE — 97530 THERAPEUTIC ACTIVITIES: CPT

## 2023-10-10 PROCEDURE — 97110 THERAPEUTIC EXERCISES: CPT

## 2023-10-10 PROCEDURE — 97112 NEUROMUSCULAR REEDUCATION: CPT

## 2023-10-13 NOTE — PROGRESS NOTES
Daily Note     Today's date: 10/13/2023  Patient name: Martinez Colindres  : 1995  MRN: [id number]  Referring provider: Aric Lorenzo MD  Dx:   Encounter Diagnosis     ICD-10-CM    1. Urinary incontinence, unspecified type  R32       2. Abdominal weakness  R19.8                      Subjective: ***      Objective: See treatment diary below      Assessment: Maxwell Crabtree tolerated today's treatment session well. Patient education provided on [facility] education options:22138}. Price Sierra completed all TE with good form and no adverse reactions. ***  Maxwell continues to benefit from skilled OPPT services to address {khdailysymptoms:05068}. Will continue to address functional deficits and focus on progression of POC per patient tolerance. Patient performed {KUAerobic:66593} to increase blood flow to the area being treated, prepare the muscles for strength training and stretching, improve overall tolerance to activity, and aerobic endurance. Plan: Continue per plan of care. Progress treatment as tolerated.        Precautions: standard       2023 10/4/2023 10/10/2023 10/17/2023       Visit Number: #1 #2 #3 #4 #5 #6 #7 #8   Outcome Measure:           Patient Ed           PFM anatomy and function KH          Double void  KH          Decrease fluids 2 hours before bed [facility]          Bladder diary  3 copies provided  Reviewed          Perineal massage    Handout provided         Birth preparation    Discussed environment- sound machine, music, lighting, smells         Pressure management   [facility]  Reviewed         Getting in and out of bed   Reviewed and practiced        Labor positions   Handout provided                    Neuro Re-Ed           Pre activation of PFM with STS, cough, bending over   [facility] Reviewed         STS with core activation and exhale  KH  x10         Cat/cow           carey pose           Quadruped alternating arms                                  Ther Ex           Warm-up    Bike level 1 5 min         fuentes sorto    1 min        fuentes SCHNEIDER   1 min                                                                Ther Activity                                 Manual           PFM exam   KH - see above                                                      Modalities

## 2023-10-14 DIAGNOSIS — Z3A.13 13 WEEKS GESTATION OF PREGNANCY: ICD-10-CM

## 2023-10-16 RX ORDER — ASPIRIN 81 MG
TABLET,CHEWABLE ORAL
Qty: 30 TABLET | Refills: 0 | Status: SHIPPED | OUTPATIENT
Start: 2023-10-16

## 2023-10-17 ENCOUNTER — OFFICE VISIT (OUTPATIENT)
Age: 28
End: 2023-10-17
Payer: COMMERCIAL

## 2023-10-17 DIAGNOSIS — R32 URINARY INCONTINENCE, UNSPECIFIED TYPE: Primary | ICD-10-CM

## 2023-10-17 DIAGNOSIS — R19.8 ABDOMINAL WEAKNESS: ICD-10-CM

## 2023-10-17 PROCEDURE — 97530 THERAPEUTIC ACTIVITIES: CPT

## 2023-10-17 PROCEDURE — 97110 THERAPEUTIC EXERCISES: CPT

## 2023-10-17 NOTE — PROGRESS NOTES
Daily Note     Today's date: 10/17/2023  Patient name: Martinez Colindres  : 1995  MRN: [id number]  Referring provider: Aric Lorenzo MD  Dx:   Encounter Diagnosis     ICD-10-CM    1. Urinary incontinence, unspecified type  R32       2. Abdominal weakness  R19.8           Start Time: 1115  Stop Time: 1200  Total time in clinic (min): 45 minutes    Subjective: Pt reports she has to really focus on pressure management to prevent UI. Otherwise doing well. Objective: See treatment diary below      Assessment: Martinez Colindres tolerated today's treatment session well. Patient education provided on  positioning for sleeping and continued wellness t/o pregnancy . [address] completed all TE with good form and no adverse reactions. Price Sierra continues to benefit from skilled OPPT services to address urinary incontinence . Will continue to address functional deficits and focus on progression of POC per patient tolerance. Patient performed Nustep to increase blood flow to the area being treated, prepare the muscles for strength training and stretching, improve overall tolerance to activity, and aerobic endurance. Plan: Continue per plan of care. Progress treatment as tolerated. Precautions: standard      Access Code: [email or web address]  URL: [email or web address]  Date: 10/17/2023  Prepared by: Jeanine Benito    Exercises  - Cat Cow  - 2 x daily - 2 sets - 10 reps  - Quadruped Alternating Arm Lift  - 2 x daily - 2 sets - 10 reps  - Child's Pose Stretch  - 1-2 x daily - 1-2 min  hold  - Swiss Ball March  - 2 x daily - 2 sets - 10 reps  - Swiss Ball Knee Extension  - 2 x daily - 2 sets - 10 reps  - Sit to Stand with Pelvic Floor Contraction  - 1 x daily - 1 sets - 10 reps     2023 10/4/2023 10/10/2023 10/17/2023       Visit Number: #1 #2 #3 #4 #5 #6 #7 #8   Outcome Measure:           Patient Ed           PFM anatomy and function KH          Double void  KH          Decrease fluids 2 hours before bed [facility]          Bladder diary  3 copies provided  Reviewed          Perineal massage    Handout provided         Birth preparation    Discussed environment- sound machine, music, lighting, smells         Pressure management   [facility]  Reviewed         Getting in and out of bed   Reviewed and practiced        Labor positions   Handout provided         Positioning in bed     [facility]                   Neuro Re-Ed           Pre activation of PFM with STS, cough, bending over   [facility] Reviewed         STS with core activation and exhale  KH  x10         Cat/cow    1 min        carey pose    1 min        Quadruped alternating arms                                  Ther Ex           Warm-up    Bike level 1 5 min  NuStep level 3 10 min        pball marches    1 min 1 min        pball LAQ   1 min  1 min                                                               Ther Activity                                 Manual           PFM exam   KH - see above                                                      Modalities

## 2023-10-19 ENCOUNTER — APPOINTMENT (OUTPATIENT)
Age: 28
End: 2023-10-19

## 2023-10-19 DIAGNOSIS — Z02.1 PRE-EMPLOYMENT EXAMINATION: Primary | ICD-10-CM

## 2023-10-19 DIAGNOSIS — Z02.1 PRE-EMPLOYMENT EXAMINATION: ICD-10-CM

## 2023-10-19 LAB
MEV IGG SER QL IA: ABNORMAL
MUV IGG SER QL IA: NORMAL
RUBV IGG SERPL IA-ACNC: 29.6 IU/ML
VZV IGG SER QL IA: NORMAL

## 2023-10-19 PROCEDURE — 36415 COLL VENOUS BLD VENIPUNCTURE: CPT

## 2023-10-19 PROCEDURE — 86480 TB TEST CELL IMMUN MEASURE: CPT

## 2023-10-19 PROCEDURE — 86735 MUMPS ANTIBODY: CPT

## 2023-10-19 PROCEDURE — 86787 VARICELLA-ZOSTER ANTIBODY: CPT

## 2023-10-19 PROCEDURE — 86762 RUBELLA ANTIBODY: CPT

## 2023-10-19 PROCEDURE — 86765 RUBEOLA ANTIBODY: CPT

## 2023-10-20 LAB
GAMMA INTERFERON BACKGROUND BLD IA-ACNC: <0 IU/ML
M TB IFN-G BLD-IMP: NEGATIVE
M TB IFN-G CD4+ BCKGRND COR BLD-ACNC: 0 IU/ML
M TB IFN-G CD4+ BCKGRND COR BLD-ACNC: 0 IU/ML
MITOGEN IGNF BCKGRD COR BLD-ACNC: 10 IU/ML

## 2023-10-26 ENCOUNTER — TELEPHONE (OUTPATIENT)
Dept: FAMILY MEDICINE CLINIC | Facility: CLINIC | Age: 28
End: 2023-10-26

## 2023-10-26 NOTE — TELEPHONE ENCOUNTER
I spoke with the patient regarding the exemption for the flu shot form. Dr. Lucian Boxer states that she should get the flu shot and it is more of a risk not to. He did not sign the form and the patient is aware.

## 2023-10-27 ENCOUNTER — APPOINTMENT (OUTPATIENT)
Dept: LAB | Facility: MEDICAL CENTER | Age: 28
End: 2023-10-27
Payer: COMMERCIAL

## 2023-10-27 DIAGNOSIS — Z34.92 SECOND TRIMESTER PREGNANCY: ICD-10-CM

## 2023-10-27 LAB
BASOPHILS # BLD AUTO: 0.02 THOUSANDS/ÂΜL (ref 0–0.1)
BASOPHILS NFR BLD AUTO: 0 % (ref 0–1)
EOSINOPHIL # BLD AUTO: 0.03 THOUSAND/ÂΜL (ref 0–0.61)
EOSINOPHIL NFR BLD AUTO: 0 % (ref 0–6)
ERYTHROCYTE [DISTWIDTH] IN BLOOD BY AUTOMATED COUNT: 13.2 % (ref 11.6–15.1)
GLUCOSE 1H P 50 G GLC PO SERPL-MCNC: 124 MG/DL (ref 40–134)
HCT VFR BLD AUTO: 37.7 % (ref 34.8–46.1)
HCV AB SER QL: NORMAL
HGB BLD-MCNC: 12.3 G/DL (ref 11.5–15.4)
IMM GRANULOCYTES # BLD AUTO: 0.06 THOUSAND/UL (ref 0–0.2)
IMM GRANULOCYTES NFR BLD AUTO: 1 % (ref 0–2)
LYMPHOCYTES # BLD AUTO: 1.34 THOUSANDS/ÂΜL (ref 0.6–4.47)
LYMPHOCYTES NFR BLD AUTO: 15 % (ref 14–44)
MCH RBC QN AUTO: 29.7 PG (ref 26.8–34.3)
MCHC RBC AUTO-ENTMCNC: 32.6 G/DL (ref 31.4–37.4)
MCV RBC AUTO: 91 FL (ref 82–98)
MONOCYTES # BLD AUTO: 0.35 THOUSAND/ÂΜL (ref 0.17–1.22)
MONOCYTES NFR BLD AUTO: 4 % (ref 4–12)
NEUTROPHILS # BLD AUTO: 7.42 THOUSANDS/ÂΜL (ref 1.85–7.62)
NEUTS SEG NFR BLD AUTO: 80 % (ref 43–75)
NRBC BLD AUTO-RTO: 0 /100 WBCS
PLATELET # BLD AUTO: 213 THOUSANDS/UL (ref 149–390)
PMV BLD AUTO: 10.4 FL (ref 8.9–12.7)
RBC # BLD AUTO: 4.14 MILLION/UL (ref 3.81–5.12)
TREPONEMA PALLIDUM IGG+IGM AB [PRESENCE] IN SERUM OR PLASMA BY IMMUNOASSAY: NORMAL
WBC # BLD AUTO: 9.22 THOUSAND/UL (ref 4.31–10.16)

## 2023-10-27 PROCEDURE — 82950 GLUCOSE TEST: CPT

## 2023-10-27 PROCEDURE — 85025 COMPLETE CBC W/AUTO DIFF WBC: CPT

## 2023-10-27 PROCEDURE — 36415 COLL VENOUS BLD VENIPUNCTURE: CPT

## 2023-10-27 PROCEDURE — 86803 HEPATITIS C AB TEST: CPT

## 2023-10-27 PROCEDURE — 86780 TREPONEMA PALLIDUM: CPT

## 2023-10-27 NOTE — PROGRESS NOTES
Daily Note     Today's date: 2023  Patient name: Ventura Mc  : 1995  MRN: [id number]  Referring provider: Donavon Steele MD  Dx:   Encounter Diagnosis     ICD-10-CM    1. Urinary incontinence, unspecified type  R32       2. Abdominal weakness  R19.8           Start Time: 1130  Stop Time: 1215  Total time in clinic (min): 45 minutes    Subjective: Pt reports she is doing well. UI has been less. Just feeling "big". Objective: See treatment diary below      Assessment: Ventura Mc tolerated today's treatment session well. Patient education provided on continuation of POC and TE. [address] completed all TE with good form and no adverse reactions. Discussed wellness throughout pregnancy. Price Sierra continues to benefit from skilled OPPT services to address  pregnancy and urinary incontinence . Will continue to address functional deficits and focus on progression of POC per patient tolerance. Patient performed Nustep to increase blood flow to the area being treated, prepare the muscles for strength training and stretching, improve overall tolerance to activity, and aerobic endurance. Plan: Continue per plan of care. Progress treatment as tolerated. Precautions: standard      Access Code: [email or web address]  URL: [email or web address]  Date: 10/17/2023  Prepared by: Nitish Mckenzie    Exercises  - Cat Cow  - 2 x daily - 2 sets - 10 reps  - Quadruped Alternating Arm Lift  - 2 x daily - 2 sets - 10 reps  - Child's Pose Stretch  - 1-2 x daily - 1-2 min  hold  - Swiss Ball March  - 2 x daily - 2 sets - 10 reps  - Swiss Ball Knee Extension  - 2 x daily - 2 sets - 10 reps  - Sit to Stand with Pelvic Floor Contraction  - 1 x daily - 1 sets - 10 reps     2023 10/4/2023 10/10/2023 10/17/2023 2023      Visit Number: #1 #2 #3 #4 #5 #6 #7 #8   Outcome Measure:           Patient Ed           PFM anatomy and function KH          Double void  KH          Decrease fluids 2 hours before bed [facility]          Bladder diary  3 copies provided  Reviewed          Perineal massage    Handout provided         Birth preparation    Discussed environment- sound machine, music, lighting, smells         Pressure management   [address] by OB      Getting in and out of bed   Reviewed and practiced        Labor positions   Handout provided   Discussed curb walking and lateral stairs at due date time       Positioning in bed     [facility]        Walking      Recommend maintaining walking routine for activity tolerance       Discussed breastfeeding and blocked milk duct treatment      Article provided                  Neuro Re-Ed           Pre activation of PFM with STS, cough, bending over   [facility] Reviewed         STS with core activation and exhale  KH  x10         Cat/cow    1 min  1 min       carey pose    1 min  1 min       Quadruped alternating arms      1 min                             Ther Ex           Warm-up    Bike level 1 5 min  NuStep level 3 10 min  NuStep level 3 10 min       pball marches    1 min 1 min        pball LAQ   1 min  1 min  1 min       Seated hip add      1 min       Seated hip abd      GTB 1 min       Bicep curl with OHP     3# BUE x20                             Ther Activity                                 Manual           PFM exam   KH - see above                                                      Modalities

## 2023-10-31 ENCOUNTER — ROUTINE PRENATAL (OUTPATIENT)
Dept: OBGYN CLINIC | Facility: MEDICAL CENTER | Age: 28
End: 2023-10-31

## 2023-10-31 VITALS — BODY MASS INDEX: 31.64 KG/M2 | DIASTOLIC BLOOD PRESSURE: 78 MMHG | SYSTOLIC BLOOD PRESSURE: 118 MMHG | WEIGHT: 199 LBS

## 2023-10-31 DIAGNOSIS — N89.8 VAGINAL ODOR: ICD-10-CM

## 2023-10-31 DIAGNOSIS — Z3A.28 28 WEEKS GESTATION OF PREGNANCY: ICD-10-CM

## 2023-10-31 DIAGNOSIS — D68.52 HETEROZYGOUS FOR PROTHROMBIN G20210A MUTATION (HCC): Primary | ICD-10-CM

## 2023-10-31 PROCEDURE — PNV: Performed by: OBSTETRICS & GYNECOLOGY

## 2023-10-31 PROCEDURE — 87660 TRICHOMONAS VAGIN DIR PROBE: CPT | Performed by: OBSTETRICS & GYNECOLOGY

## 2023-10-31 PROCEDURE — 87480 CANDIDA DNA DIR PROBE: CPT | Performed by: OBSTETRICS & GYNECOLOGY

## 2023-10-31 PROCEDURE — 87510 GARDNER VAG DNA DIR PROBE: CPT | Performed by: OBSTETRICS & GYNECOLOGY

## 2023-10-31 NOTE — PATIENT INSTRUCTIONS
Kick Counts in Pregnancy   WHAT YOU NEED TO KNOW:   Kick counts measure how much your baby is moving in your womb. A kick from your baby can be felt as a twist, turn, swish, roll, or jab. It is common to feel your baby kicking at 26 to 28 weeks of pregnancy. You may feel your baby kick as early as 20 weeks of pregnancy. You may want to start counting at 28 weeks. DISCHARGE INSTRUCTIONS:   Contact your doctor immediately if:   You feel a change in the number of kicks or movements of your baby. You feel fewer than 10 kicks within 2 hours. You have questions or concerns about your baby's movements. Why measure kick counts:  Your baby's movement may provide information about your baby's health. He or she may move less, or not at all, if there are problems. Your baby may move less if he or she is not getting enough oxygen or nutrition from the placenta. Do not smoke while you are pregnant. Smoking decreases the amount of oxygen that gets to your baby. Talk to your healthcare provider if you need help to quit smoking. Tell your healthcare provider as soon as you feel a change in your baby's movements. When to measure kick counts:   Measure kick counts at the same time every day. Measure kick counts when your baby is awake and most active. Your baby may be most active in the evening. How to measure kick counts:  Check that your baby is awake before you measure kick counts. You can wake up your baby by lightly pushing on your belly, walking, or drinking something cold. Your healthcare provider may tell you different ways to measure kick counts. You may be told to do the following:  Use a chart or clock to keep track of the time you start and finish counting. Sit in a chair or lie on your left side. Place your hands on the largest part of your belly. Count until you reach 10 kicks. Write down how much time it takes to count 10 kicks. It may take 30 minutes to 2 hours to count 10 kicks. It should not take more than 2 hours to count 10 kicks. Follow up with your doctor as directed:  Write down your questions so you remember to ask them during your visits. © Copyright [employer] 2023 Information is for End User's use only and may not be sold, redistributed or otherwise used for commercial purposes. The above information is an  only. It is not intended as medical advice for individual conditions or treatments. Talk to your doctor, nurse or pharmacist before following any medical regimen to see if it is safe and effective for you.

## 2023-10-31 NOTE — PROGRESS NOTES
Lilian Dumont is a 33y.o. year old  at 35w3d for routine prenatal visit. GTT and CBC done today  RhoGam needed No  Tdap needed Yes Given: No- desires to get at next visit   Declined flu shot   606/706 Caitie Sierra reviewed  28 week booklet and birth plan given today.     Has follow up growth at MFM at 32 weeks   Vaginal odor  - affirm collected

## 2023-11-01 ENCOUNTER — OFFICE VISIT (OUTPATIENT)
Age: 28
End: 2023-11-01
Payer: COMMERCIAL

## 2023-11-01 DIAGNOSIS — R19.8 ABDOMINAL WEAKNESS: ICD-10-CM

## 2023-11-01 DIAGNOSIS — R32 URINARY INCONTINENCE, UNSPECIFIED TYPE: Primary | ICD-10-CM

## 2023-11-01 LAB
CANDIDA RRNA VAG QL PROBE: NEGATIVE
G VAGINALIS RRNA GENITAL QL PROBE: NEGATIVE
T VAGINALIS RRNA GENITAL QL PROBE: NEGATIVE

## 2023-11-01 PROCEDURE — 97110 THERAPEUTIC EXERCISES: CPT

## 2023-11-01 PROCEDURE — 97112 NEUROMUSCULAR REEDUCATION: CPT

## 2023-11-07 ENCOUNTER — TELEPHONE (OUTPATIENT)
Dept: OBGYN CLINIC | Facility: MEDICAL CENTER | Age: 28
End: 2023-11-07

## 2023-11-07 NOTE — TELEPHONE ENCOUNTER
Pt states that her hands have been going numb during the night while sleeping. She does have medical history of gene mutation for clotting. She states it's been happening the last few weeks.  Please advise

## 2023-11-16 ENCOUNTER — ROUTINE PRENATAL (OUTPATIENT)
Dept: OBGYN CLINIC | Facility: MEDICAL CENTER | Age: 28
End: 2023-11-16

## 2023-11-16 VITALS — DIASTOLIC BLOOD PRESSURE: 74 MMHG | SYSTOLIC BLOOD PRESSURE: 116 MMHG | WEIGHT: 202 LBS | BODY MASS INDEX: 32.12 KG/M2

## 2023-11-16 DIAGNOSIS — Z34.93 THIRD TRIMESTER PREGNANCY: ICD-10-CM

## 2023-11-16 DIAGNOSIS — K21.9 GASTRIC REFLUX: ICD-10-CM

## 2023-11-16 DIAGNOSIS — D68.59 THROMBOPHILIA AFFECTING PREGNANCY IN SECOND TRIMESTER, ANTEPARTUM (HCC): ICD-10-CM

## 2023-11-16 DIAGNOSIS — Z3A.30 30 WEEKS GESTATION OF PREGNANCY: ICD-10-CM

## 2023-11-16 DIAGNOSIS — O99.112 THROMBOPHILIA AFFECTING PREGNANCY IN SECOND TRIMESTER, ANTEPARTUM (HCC): ICD-10-CM

## 2023-11-16 DIAGNOSIS — D68.52 HETEROZYGOUS FOR PROTHROMBIN G20210A MUTATION (HCC): Primary | ICD-10-CM

## 2023-11-16 PROCEDURE — PNV: Performed by: ADVANCED PRACTICE MIDWIFE

## 2023-11-16 RX ORDER — OMEPRAZOLE 40 MG/1
40 CAPSULE, DELAYED RELEASE ORAL DAILY
Qty: 30 CAPSULE | Refills: 3 | Status: SHIPPED | OUTPATIENT
Start: 2023-11-16

## 2023-11-16 NOTE — PROGRESS NOTES
Routine Prenatal Visit  [facility] [address] [place] [address]    Assessment/Plan:  Ruby Robledo is a 33y.o. year old  at 32w1d who presents for routine prenatal visit. 1. Heterozygous for prothrombin [id number] mutation ([address])    2. Third trimester pregnancy  -     Breast pump    3. 30 weeks gestation of pregnancy  Assessment & Plan:  - Reviewed s/s PTL, FKC, Perineal massage  - 162 mg ASA daily until 36 weeks  - 28 week labs normal  -  TDAP- will consider next visit  - RSV vaccine - reviewed and unsure if desires  - Birth plan- will bring next visit  - NIPS - low risk  - follow-up with MFM 23  - prophylaxis with low molecular weight heparin  is recommended for 6 weeks following delivery. - next visit 2 weeks    Orders:  -     Breast pump    4. Thrombophilia affecting pregnancy in second trimester, antepartum ([address])    5. Lactating mother  -     Breast pump    6. Gastric reflux  -     omeprazole (PriLOSEC) 40 MG capsule; Take 1 capsule (40 mg total) by mouth daily          Subjective:     CC: Prenatal care    Lang Mcmillan is a 33 y.o.  female who presents for routine prenatal care at 30w4d. Pregnancy ROS: no leakage of fluid, pelvic pain, or vaginal bleeding. Good fetal movement. Occasional ctx.      The following portions of the patient's history were reviewed and updated as appropriate: allergies, current medications, past family history, past medical history, obstetric history, gynecologic history, past social history, past surgical history and problem list.      Objective:  /74   Wt 91.6 kg (202 lb)   LMP 2023 (Exact Date)   BMI 32.12 kg/m²   Pregravid Weight/BMI: 77.1 kg (170 lb) (BMI 27.03)  Current Weight: 91.6 kg (202 lb)   Total Weight Gain: 14.5 kg (32 lb)   Pre-Sohan Vitals      Flowsheet Row Most Recent Value   Prenatal Assessment    Fetal Heart Rate 140   Movement Present   Prenatal Vitals    Blood Pressure 116/74   Weight - Scale 91.6 kg (202 lb)   Urine Albumin/Glucose    Dilation/Effacement/Station    Vaginal Drainage    Edema    LLE Edema Trace   RLE Edema Trace             General: Well appearing, no distress  Respiratory: Unlabored breathing  Cardiovascular: Regular rate. Abdomen: Soft, gravid, nontender  Fundal Height: Appropriate for gestational age. Extremities: Warm and well perfused. Non tender.

## 2023-11-16 NOTE — ASSESSMENT & PLAN NOTE
- Reviewed s/s PTL, FKC, Perineal massage  - 162 mg ASA daily until 36 weeks  - 28 week labs normal  -  TDAP- will consider next visit  - RSV vaccine - reviewed and unsure if desires  - Birth plan- will bring next visit  - NIPS - low risk  - follow-up with [facility] 11/30/23  - prophylaxis with low molecular weight heparin  is recommended for 6 weeks following delivery.   - next visit 2 weeks

## 2023-11-22 DIAGNOSIS — Z3A.13 13 WEEKS GESTATION OF PREGNANCY: ICD-10-CM

## 2023-11-23 RX ORDER — ASPIRIN 81 MG/1
TABLET, CHEWABLE ORAL
Qty: 30 TABLET | Refills: 0 | Status: SHIPPED | OUTPATIENT
Start: 2023-11-23 | End: 2023-12-28

## 2023-11-27 PROBLEM — O99.113 THROMBOPHILIA COMPLICATING PREGNANCY IN THIRD TRIMESTER, ANTEPARTUM (HCC): Status: ACTIVE | Noted: 2023-09-07

## 2023-11-27 PROBLEM — Z3A.32 32 WEEKS GESTATION OF PREGNANCY: Status: ACTIVE | Noted: 2023-07-17

## 2023-11-28 ENCOUNTER — ROUTINE PRENATAL (OUTPATIENT)
Dept: OBGYN CLINIC | Facility: CLINIC | Age: 28
End: 2023-11-28
Payer: COMMERCIAL

## 2023-11-28 VITALS — BODY MASS INDEX: 32.24 KG/M2 | DIASTOLIC BLOOD PRESSURE: 78 MMHG | WEIGHT: 202.8 LBS | SYSTOLIC BLOOD PRESSURE: 112 MMHG

## 2023-11-28 DIAGNOSIS — Z3A.32 32 WEEKS GESTATION OF PREGNANCY: ICD-10-CM

## 2023-11-28 DIAGNOSIS — Z23 ENCOUNTER FOR IMMUNIZATION: ICD-10-CM

## 2023-11-28 DIAGNOSIS — D68.59 THROMBOPHILIA COMPLICATING PREGNANCY IN THIRD TRIMESTER, ANTEPARTUM (HCC): Primary | ICD-10-CM

## 2023-11-28 DIAGNOSIS — O99.113 THROMBOPHILIA COMPLICATING PREGNANCY IN THIRD TRIMESTER, ANTEPARTUM (HCC): Primary | ICD-10-CM

## 2023-11-28 PROCEDURE — 90471 IMMUNIZATION ADMIN: CPT

## 2023-11-28 PROCEDURE — PNV: Performed by: NURSE PRACTITIONER

## 2023-11-28 PROCEDURE — 90715 TDAP VACCINE 7 YRS/> IM: CPT

## 2023-11-28 RX ORDER — SODIUM FLUORIDE 6 MG/ML
PASTE, DENTIFRICE DENTAL
COMMUNITY
Start: 2023-11-20

## 2023-11-28 NOTE — PROGRESS NOTES
Denies loss of fluid, vaginal bleeding and abdominal pain. Confirms frequent fetal movement. Doing fetal kick counts. Tolerating prenatal vitamin, omeprazole and low-dose aspirin well. Reviewed recommendation for influenza, RSV and Tdap vaccine. Patient agreeable to Tdap vaccine. Declines influenza at this time. Is considering RSV vaccine. Has not chosen pediatrician. 28-week labs reviewed-WNL. Is having some carpal tunnel bilateral.  And right middle finger-trigger finger. Reviewed with patient if anything gets worse can offer physical therapy or referral to hand specialist  BP: 112/78 weight: +32lbs   Plan  -Continue prenatal vitamin and low-dose aspirin daily  -Continue fetal kick counts daily  -Reflux in pregnancy continue omeprazole  -Tdap vaccine today. Considering RSV vaccine  -Follow-up with  center scheduled for 23  -Common discomforts of pregnancy and precautions including  labor reviewed. Signs and symptoms to report reviewed.   RTO 2 weeks f/u RSV vaccine & US

## 2023-11-30 ENCOUNTER — ULTRASOUND (OUTPATIENT)
Dept: PERINATAL CARE | Facility: OTHER | Age: 28
End: 2023-11-30
Payer: COMMERCIAL

## 2023-11-30 VITALS
HEIGHT: 67 IN | DIASTOLIC BLOOD PRESSURE: 70 MMHG | HEART RATE: 99 BPM | BODY MASS INDEX: 32.15 KG/M2 | WEIGHT: 204.8 LBS | SYSTOLIC BLOOD PRESSURE: 120 MMHG

## 2023-11-30 DIAGNOSIS — O99.113 THROMBOPHILIA COMPLICATING PREGNANCY IN THIRD TRIMESTER, ANTEPARTUM (HCC): Primary | ICD-10-CM

## 2023-11-30 DIAGNOSIS — Z3A.32 32 WEEKS GESTATION OF PREGNANCY: ICD-10-CM

## 2023-11-30 DIAGNOSIS — D68.59 THROMBOPHILIA COMPLICATING PREGNANCY IN THIRD TRIMESTER, ANTEPARTUM (HCC): Primary | ICD-10-CM

## 2023-11-30 PROCEDURE — 76816 OB US FOLLOW-UP PER FETUS: CPT | Performed by: OBSTETRICS & GYNECOLOGY

## 2023-11-30 PROCEDURE — 99212 OFFICE O/P EST SF 10 MIN: CPT | Performed by: OBSTETRICS & GYNECOLOGY

## 2023-11-30 NOTE — PROGRESS NOTES
The patient was seen today for an ultrasound. Please see ultrasound report (located under Ob Procedures) for additional details. Thank you very much for allowing us to participate in the care of this very nice patient. Should you have any questions, please do not hesitate to contact me. [place] [id number] [facility]  Attending Physician, [address] [place] [address]

## 2023-12-06 DIAGNOSIS — B00.2 ORAL HERPES SIMPLEX INFECTION: Primary | ICD-10-CM

## 2023-12-06 RX ORDER — VALACYCLOVIR HYDROCHLORIDE 1 G/1
1000 TABLET, FILM COATED ORAL DAILY
Qty: 10 TABLET | Refills: 0 | Status: SHIPPED | OUTPATIENT
Start: 2023-12-06 | End: 2023-12-16

## 2023-12-06 NOTE — ADDENDUM NOTE
[facility] on: 12/6/2023 11:52 AM     Modules accepted: Orders

## 2023-12-06 NOTE — TELEPHONE ENCOUNTER
Pt called into the office. Asked if a script can be placed as she now has a cold sore after not feeling well for a few days.

## 2023-12-13 PROBLEM — Z3A.34 34 WEEKS GESTATION OF PREGNANCY: Status: ACTIVE | Noted: 2023-07-17

## 2023-12-13 NOTE — ASSESSMENT & PLAN NOTE
- reviewed s/s labor, FKC, perineal massage  - 162 mg ASA daily until 36 weeks  - NIPS low risk  - prophylaxis with low molecular weight heparin  is recommended for 6 weeks following delivery.   - Growth scan 11/30/23- S=D  - GBS next visit  - RTO 2 weeks

## 2023-12-14 ENCOUNTER — ROUTINE PRENATAL (OUTPATIENT)
Dept: OBGYN CLINIC | Facility: MEDICAL CENTER | Age: 28
End: 2023-12-14

## 2023-12-14 VITALS — DIASTOLIC BLOOD PRESSURE: 72 MMHG | WEIGHT: 205 LBS | BODY MASS INDEX: 32.59 KG/M2 | SYSTOLIC BLOOD PRESSURE: 116 MMHG

## 2023-12-14 DIAGNOSIS — D68.52 PROTHROMBIN GENE MUTATION (HCC): Primary | ICD-10-CM

## 2023-12-14 DIAGNOSIS — D68.52 HETEROZYGOUS FOR PROTHROMBIN G20210A MUTATION (HCC): ICD-10-CM

## 2023-12-14 DIAGNOSIS — G56.03 BILATERAL CARPAL TUNNEL SYNDROME: ICD-10-CM

## 2023-12-14 DIAGNOSIS — Z34.93 THIRD TRIMESTER PREGNANCY: ICD-10-CM

## 2023-12-14 DIAGNOSIS — Z3A.34 34 WEEKS GESTATION OF PREGNANCY: ICD-10-CM

## 2023-12-14 DIAGNOSIS — O99.113 THROMBOPHILIA COMPLICATING PREGNANCY IN THIRD TRIMESTER, ANTEPARTUM (HCC): ICD-10-CM

## 2023-12-14 DIAGNOSIS — D68.59 THROMBOPHILIA COMPLICATING PREGNANCY IN THIRD TRIMESTER, ANTEPARTUM (HCC): ICD-10-CM

## 2023-12-14 PROCEDURE — PNV: Performed by: ADVANCED PRACTICE MIDWIFE

## 2023-12-14 NOTE — PROGRESS NOTES
Routine Prenatal Visit  [facility] [address] [place] [address]    Assessment/Plan:  Veto Samuels is a 33y.o. year old  at 34w4d who presents for routine prenatal visit. 1. Prothrombin gene mutation ([address])    2. Heterozygous for prothrombin [id number] mutation ([address])    3. Thrombophilia complicating pregnancy in third trimester, antepartum ([address])    4. Third trimester pregnancy    5. 34 weeks gestation of pregnancy  Assessment & Plan:  - reviewed s/s labor, FKC, perineal massage  - 162 mg ASA daily until 36 weeks  - NIPS low risk  - prophylaxis with low molecular weight heparin  is recommended for 6 weeks following delivery. - Growth scan 23- S=D  - GBS next visit  - RTO 2 weeks          6. Bilateral carpal tunnel syndrome  -     Magnesium Oxide 250 MG TABS; Take 1 tablet (250 mg total) by mouth daily      Subjective:     CC: Prenatal care    Erlin Aaron is a 33 y.o.  female who presents for routine prenatal care at 34w4d. Pregnancy ROS: no leakage of fluid, pelvic pain, or vaginal bleeding. Good fetal movement.     The following portions of the patient's history were reviewed and updated as appropriate: allergies, current medications, past family history, past medical history, obstetric history, gynecologic history, past social history, past surgical history and problem list.      Objective:  /72   Wt 93 kg (205 lb)   LMP 2023 (Exact Date)   BMI 32.59 kg/m²   Pregravid Weight/BMI: 77.1 kg (170 lb) (BMI 27.03)  Current Weight: 93 kg (205 lb)   Total Weight Gain: 15.9 kg (35 lb)   Pre-Sohan Vitals    Flowsheet Row Most Recent Value   Prenatal Assessment    Fetal Heart Rate 152   Movement Present   Presentation Vertex   Prenatal Vitals    Blood Pressure 116/72   Weight - Scale 93 kg (205 lb)   Urine Albumin/Glucose    Dilation/Effacement/Station    Vaginal Drainage    Edema    LLE Edema Trace   RLE Edema Trace           General: Well appearing, no distress  Respiratory: Unlabored breathing  Cardiovascular: Regular rate. Abdomen: Soft, gravid, nontender  Fundal Height: Appropriate for gestational age. Extremities: Warm and well perfused. Non tender.

## 2023-12-26 PROBLEM — Z3A.36 36 WEEKS GESTATION OF PREGNANCY: Status: ACTIVE | Noted: 2023-07-17

## 2023-12-27 ENCOUNTER — ROUTINE PRENATAL (OUTPATIENT)
Dept: OBGYN CLINIC | Facility: MEDICAL CENTER | Age: 28
End: 2023-12-27

## 2023-12-27 VITALS — BODY MASS INDEX: 33.45 KG/M2 | WEIGHT: 210.4 LBS | SYSTOLIC BLOOD PRESSURE: 122 MMHG | DIASTOLIC BLOOD PRESSURE: 78 MMHG

## 2023-12-27 DIAGNOSIS — D68.59 THROMBOPHILIA COMPLICATING PREGNANCY IN THIRD TRIMESTER, ANTEPARTUM (HCC): Primary | ICD-10-CM

## 2023-12-27 DIAGNOSIS — Z3A.36 36 WEEKS GESTATION OF PREGNANCY: ICD-10-CM

## 2023-12-27 DIAGNOSIS — O99.113 THROMBOPHILIA COMPLICATING PREGNANCY IN THIRD TRIMESTER, ANTEPARTUM (HCC): Primary | ICD-10-CM

## 2023-12-27 DIAGNOSIS — D68.52 PROTHROMBIN GENE MUTATION (HCC): ICD-10-CM

## 2023-12-27 PROCEDURE — PNV: Performed by: NURSE PRACTITIONER

## 2023-12-27 PROCEDURE — 87150 DNA/RNA AMPLIFIED PROBE: CPT | Performed by: NURSE PRACTITIONER

## 2023-12-27 NOTE — PROGRESS NOTES
Denies loss of fluid, vaginal bleeding and abdominal pain.  Confirms frequent fetal movement.  Doing fetal kick counts.  Tolerating prenatal vitamin well.  Has Prilosec as needed for heartburn.  Denies questions or concerns at today's visit  BP: 122/78 weight: + 40lb  Plan  -Continue prenatal vitamin daily  -Continue fetal kick counts daily  -Continue vaginal/perineal massage 1-4 times per week  -GBS collected-no penicillin allergy  -History of ITP CBC ordered to check platelets  -Common discomforts of pregnancy and precautions including labor reviewed.  Signs and symptoms to report reviewed.  RTO 1 week

## 2023-12-29 LAB — GP B STREP DNA SPEC QL NAA+PROBE: NEGATIVE

## 2024-01-05 ENCOUNTER — APPOINTMENT (OUTPATIENT)
Dept: LAB | Facility: MEDICAL CENTER | Age: 29
End: 2024-01-05
Payer: COMMERCIAL

## 2024-01-05 ENCOUNTER — ROUTINE PRENATAL (OUTPATIENT)
Dept: OBGYN CLINIC | Facility: MEDICAL CENTER | Age: 29
End: 2024-01-05

## 2024-01-05 VITALS — SYSTOLIC BLOOD PRESSURE: 128 MMHG | WEIGHT: 212 LBS | BODY MASS INDEX: 33.71 KG/M2 | DIASTOLIC BLOOD PRESSURE: 78 MMHG

## 2024-01-05 DIAGNOSIS — D68.59 THROMBOPHILIA COMPLICATING PREGNANCY IN THIRD TRIMESTER, ANTEPARTUM (HCC): Primary | ICD-10-CM

## 2024-01-05 DIAGNOSIS — O16.3 ELEVATED BLOOD PRESSURE COMPLICATING PREGNANCY, ANTEPARTUM, THIRD TRIMESTER: ICD-10-CM

## 2024-01-05 DIAGNOSIS — D68.59 THROMBOPHILIA COMPLICATING PREGNANCY IN THIRD TRIMESTER, ANTEPARTUM (HCC): ICD-10-CM

## 2024-01-05 DIAGNOSIS — Z3A.36 36 WEEKS GESTATION OF PREGNANCY: ICD-10-CM

## 2024-01-05 DIAGNOSIS — Z3A.37 37 WEEKS GESTATION OF PREGNANCY: ICD-10-CM

## 2024-01-05 DIAGNOSIS — O99.113 THROMBOPHILIA COMPLICATING PREGNANCY IN THIRD TRIMESTER, ANTEPARTUM (HCC): Primary | ICD-10-CM

## 2024-01-05 DIAGNOSIS — O99.113 THROMBOPHILIA COMPLICATING PREGNANCY IN THIRD TRIMESTER, ANTEPARTUM (HCC): ICD-10-CM

## 2024-01-05 LAB
ALBUMIN SERPL BCP-MCNC: 3.5 G/DL (ref 3.5–5)
ALP SERPL-CCNC: 199 U/L (ref 34–104)
ALT SERPL W P-5'-P-CCNC: 22 U/L (ref 7–52)
ANION GAP SERPL CALCULATED.3IONS-SCNC: 8 MMOL/L
AST SERPL W P-5'-P-CCNC: 26 U/L (ref 13–39)
BASOPHILS # BLD AUTO: 0.02 THOUSANDS/ÂΜL (ref 0–0.1)
BASOPHILS NFR BLD AUTO: 0 % (ref 0–1)
BILIRUB SERPL-MCNC: 0.31 MG/DL (ref 0.2–1)
BUN SERPL-MCNC: 13 MG/DL (ref 5–25)
CALCIUM SERPL-MCNC: 9.1 MG/DL (ref 8.4–10.2)
CHLORIDE SERPL-SCNC: 104 MMOL/L (ref 96–108)
CO2 SERPL-SCNC: 24 MMOL/L (ref 21–32)
CREAT SERPL-MCNC: 0.88 MG/DL (ref 0.6–1.3)
CREAT UR-MCNC: 216 MG/DL
EOSINOPHIL # BLD AUTO: 0.04 THOUSAND/ÂΜL (ref 0–0.61)
EOSINOPHIL NFR BLD AUTO: 0 % (ref 0–6)
ERYTHROCYTE [DISTWIDTH] IN BLOOD BY AUTOMATED COUNT: 13.6 % (ref 11.6–15.1)
GFR SERPL CREATININE-BSD FRML MDRD: 90 ML/MIN/1.73SQ M
GLUCOSE P FAST SERPL-MCNC: 78 MG/DL (ref 65–99)
HCT VFR BLD AUTO: 41 % (ref 34.8–46.1)
HGB BLD-MCNC: 13.4 G/DL (ref 11.5–15.4)
IMM GRANULOCYTES # BLD AUTO: 0.04 THOUSAND/UL (ref 0–0.2)
IMM GRANULOCYTES NFR BLD AUTO: 0 % (ref 0–2)
LYMPHOCYTES # BLD AUTO: 1.95 THOUSANDS/ÂΜL (ref 0.6–4.47)
LYMPHOCYTES NFR BLD AUTO: 21 % (ref 14–44)
MCH RBC QN AUTO: 29.6 PG (ref 26.8–34.3)
MCHC RBC AUTO-ENTMCNC: 32.7 G/DL (ref 31.4–37.4)
MCV RBC AUTO: 91 FL (ref 82–98)
MONOCYTES # BLD AUTO: 0.59 THOUSAND/ÂΜL (ref 0.17–1.22)
MONOCYTES NFR BLD AUTO: 6 % (ref 4–12)
NEUTROPHILS # BLD AUTO: 6.71 THOUSANDS/ÂΜL (ref 1.85–7.62)
NEUTS SEG NFR BLD AUTO: 73 % (ref 43–75)
NRBC BLD AUTO-RTO: 0 /100 WBCS
PLATELET # BLD AUTO: 212 THOUSANDS/UL (ref 149–390)
PMV BLD AUTO: 11.1 FL (ref 8.9–12.7)
POTASSIUM SERPL-SCNC: 3.9 MMOL/L (ref 3.5–5.3)
PROT SERPL-MCNC: 6.2 G/DL (ref 6.4–8.4)
PROT UR-MCNC: 32 MG/DL
PROT/CREAT UR: 0.15 MG/G{CREAT} (ref 0–0.1)
RBC # BLD AUTO: 4.53 MILLION/UL (ref 3.81–5.12)
SODIUM SERPL-SCNC: 136 MMOL/L (ref 135–147)
WBC # BLD AUTO: 9.35 THOUSAND/UL (ref 4.31–10.16)

## 2024-01-05 PROCEDURE — PNV: Performed by: STUDENT IN AN ORGANIZED HEALTH CARE EDUCATION/TRAINING PROGRAM

## 2024-01-05 PROCEDURE — 82570 ASSAY OF URINE CREATININE: CPT | Performed by: STUDENT IN AN ORGANIZED HEALTH CARE EDUCATION/TRAINING PROGRAM

## 2024-01-05 PROCEDURE — 85025 COMPLETE CBC W/AUTO DIFF WBC: CPT

## 2024-01-05 PROCEDURE — 80053 COMPREHEN METABOLIC PANEL: CPT

## 2024-01-05 PROCEDURE — 36415 COLL VENOUS BLD VENIPUNCTURE: CPT

## 2024-01-05 PROCEDURE — 84156 ASSAY OF PROTEIN URINE: CPT | Performed by: STUDENT IN AN ORGANIZED HEALTH CARE EDUCATION/TRAINING PROGRAM

## 2024-01-05 NOTE — PROGRESS NOTES
Routine Prenatal Visit  [facility] [address], [place], [place]    Assessment/Plan:  Maxwell is a 28 y.o. year old  at 37w5d who presents for routine prenatal visit.     1. Thrombophilia complicating pregnancy in third trimester, antepartum (HCC)  Assessment & Plan:  - No personal VTE history, plan is lovenox for 6 weeks postpartum      2. Elevated blood pressure complicating pregnancy, antepartum, third trimester  Assessment & Plan:  - Reported a few 140/80 at home in the last few days, normal today in the office 128/78  - Send for labs, if getting elevated pressure at home over the weekend, recommend reporting to L&D triage  - She denies headache, visual disturbances, dyspnea, chest pain, epigastric pain, right upper quadrant pain, or generalized edema.  Discussed signs/symptoms of pre-eclampsia.    Orders:  -     Comprehensive metabolic panel; Future  -     Protein / creatinine ratio, urine    3. 37 weeks gestation of pregnancy          Subjective:     CC: Prenatal care    Maxwell Crabtree is a 28 y.o.  female who presents for routine prenatal care at 37w5d.  Pregnancy ROS: Denies leakage of fluid, pelvic pain, or vaginal bleeding.  Reports normal fetal movement.    Had some contractions over the weekend that resolved.  Has noted some elevated Blood pressures at home.     The following portions of the patient's history were reviewed and updated as appropriate: allergies, current medications, past family history, past medical history, obstetric history, gynecologic history, past social history, past surgical history and problem list.      Objective:  /78   Wt 96.2 kg (212 lb)   LMP 2023 (Exact Date)   BMI 33.71 kg/m²   Pregravid Weight/BMI: 77.1 kg (170 lb) (BMI 27.03)  Current Weight: 96.2 kg (212 lb)   Total Weight Gain: 19.1 kg (42 lb)   Pre- Vitals      Flowsheet Row Most Recent Value   Prenatal Assessment    Fetal Heart Rate 138   Movement  Present   Prenatal Vitals    Blood Pressure 128/78   Weight - Scale 96.2 kg (212 lb)   Urine Albumin/Glucose    Dilation/Effacement/Station    Vaginal Drainage    Draining Fluid No   Edema    LLE Edema Trace   RLE Edema Trace             General: Well appearing, no distress  Respiratory: Unlabored breathing  Cardiovascular: Regular rate.  Abdomen: Soft, gravid, nontender  Fundal Height: Appropriate for gestational age.  Extremities: Warm and well perfused.  Non tender.

## 2024-01-05 NOTE — ASSESSMENT & PLAN NOTE
- Reported a few 140/80 at home in the last few days, normal today in the office 128/78  - Send for labs, if getting elevated pressure at home over the weekend, recommend reporting to L&D triage  - She denies headache, visual disturbances, dyspnea, chest pain, epigastric pain, right upper quadrant pain, or generalized edema.  Discussed signs/symptoms of pre-eclampsia.   no peripheral edema/no orthopnea/no claudication/no palpitations/no paroxysmal nocturnal dyspnea

## 2024-01-09 ENCOUNTER — ROUTINE PRENATAL (OUTPATIENT)
Dept: OBGYN CLINIC | Facility: MEDICAL CENTER | Age: 29
End: 2024-01-09

## 2024-01-09 VITALS — WEIGHT: 213.3 LBS | BODY MASS INDEX: 33.91 KG/M2 | DIASTOLIC BLOOD PRESSURE: 78 MMHG | SYSTOLIC BLOOD PRESSURE: 124 MMHG

## 2024-01-09 DIAGNOSIS — D68.52 PROTHROMBIN GENE MUTATION (HCC): ICD-10-CM

## 2024-01-09 DIAGNOSIS — O99.113 THROMBOPHILIA COMPLICATING PREGNANCY IN THIRD TRIMESTER, ANTEPARTUM (HCC): ICD-10-CM

## 2024-01-09 DIAGNOSIS — O16.3 ELEVATED BLOOD PRESSURE COMPLICATING PREGNANCY, ANTEPARTUM, THIRD TRIMESTER: ICD-10-CM

## 2024-01-09 DIAGNOSIS — Z34.93 THIRD TRIMESTER PREGNANCY: Primary | ICD-10-CM

## 2024-01-09 DIAGNOSIS — Z3A.38 38 WEEKS GESTATION OF PREGNANCY: ICD-10-CM

## 2024-01-09 DIAGNOSIS — D68.59 THROMBOPHILIA COMPLICATING PREGNANCY IN THIRD TRIMESTER, ANTEPARTUM (HCC): ICD-10-CM

## 2024-01-09 PROCEDURE — PNV: Performed by: OBSTETRICS & GYNECOLOGY

## 2024-01-09 NOTE — PROGRESS NOTES
Assessment  28 y.o.  at 38w2d presenting for routine prenatal visit.     Plan  Diagnoses and all orders for this visit:    Third trimester pregnancy  38 weeks gestation of pregnancy  - Labor precautions  - FKC  - Prefers expectant management presently, but is considering IOL if undelivered by [facility]. Considering membrane sweep for next visit  - Return in 1wk for PN    Elevated blood pressure complicating pregnancy, antepartum, third trimester  - Home systolic BPs in 140s reported last visit. None further  - Asymptomatic   - Continue routine monitoring    Thrombophilia complicating pregnancy in third trimester, antepartum ([facility])  Prothrombin gene mutation ([facility])  - No hx VTE  - Planning 6wks of prophylaxis postpartum      ____________________________________________________________      Subjective    Maxwell Crabtree is a 28 y.o.  at 38w2d who presents for routine prenatal visit. She is without complaint. She has more intense triston dowling ctxns over time, but irregular and only had to time them once since last visit. Denies loss of fluid, or vaginal bleeding. She feels regular fetal movements. No further dizziness and BPs at home have been normal since last assessment.    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 ([facility])    Thrombophilia complicating pregnancy in third trimester, antepartum ([facility])    Lactating mother    Third trimester pregnancy    Elevated blood pressure complicating pregnancy, antepartum, third trimester         Objective  /78   Wt 96.8 kg (213 lb 4.8 oz)   LMP 2023 (Exact Date)   BMI 33.91 kg/m²     FHT: 130 BPM   Uterine Size: size equals dates   Presentations: cephalic   Pelvic Exam:     Dilation: 2cm    Effacement: 50%    Station:  -3    Consistency: medium    Position: middle      Physical Exam:  Physical Exam  Constitutional:       General: She is not in acute distress.     Appearance: Normal appearance. She is well-developed. She is not ill-appearing, toxic-appearing or diaphoretic.   HENT:      Head: Normocephalic and atraumatic.   Eyes:      General: No scleral icterus.        Right eye: No discharge.         Left eye: No discharge.      Conjunctiva/sclera: Conjunctivae normal.   Pulmonary:      Effort: Pulmonary effort is normal. No accessory muscle usage or respiratory distress.   Abdominal:      General: There is distension (gravid).      Tenderness: There is no abdominal tenderness. There is no guarding or rebound.   Musculoskeletal:      Right lower leg: Edema (2+ ankle, 1+ to mid calf b/l) present.      Left lower leg: Edema present.   Skin:     General: Skin is warm and dry.      Coloration: Skin is not jaundiced.      Findings: No bruising, erythema or rash.   Neurological:      Mental Status: She is alert.   Psychiatric:         Mood and Affect: Mood normal.         Behavior: Behavior normal.         Thought Content: Thought content normal.         Judgment: Judgment normal.

## 2024-01-10 ENCOUNTER — HOSPITAL ENCOUNTER (OUTPATIENT)
Facility: HOSPITAL | Age: 29
Discharge: HOME | End: 2024-01-10
Attending: OBSTETRICS & GYNECOLOGY | Admitting: OBSTETRICS & GYNECOLOGY
Payer: COMMERCIAL

## 2024-01-10 VITALS — HEART RATE: 85 BPM | DIASTOLIC BLOOD PRESSURE: 88 MMHG | SYSTOLIC BLOOD PRESSURE: 137 MMHG

## 2024-01-10 PROBLEM — O16.9 HYPERTENSION AFFECTING PREGNANCY: Status: ACTIVE | Noted: 2024-01-10

## 2024-01-10 LAB
ABO GROUP BLD: NORMAL
ALBUMIN SERPL BCP-MCNC: 3.8 G/DL (ref 3.5–5)
ALP SERPL-CCNC: 235 U/L (ref 34–104)
ALT SERPL W P-5'-P-CCNC: 19 U/L (ref 7–52)
ANION GAP SERPL CALCULATED.3IONS-SCNC: 9 MMOL/L
AST SERPL W P-5'-P-CCNC: 22 U/L (ref 13–39)
BASOPHILS # BLD AUTO: 0.01 THOUSANDS/ÂΜL (ref 0–0.1)
BASOPHILS NFR BLD AUTO: 0 % (ref 0–1)
BILIRUB SERPL-MCNC: 0.28 MG/DL (ref 0.2–1)
BLD GP AB SCN SERPL QL: NEGATIVE
BUN SERPL-MCNC: 13 MG/DL (ref 5–25)
CALCIUM SERPL-MCNC: 9 MG/DL (ref 8.4–10.2)
CHLORIDE SERPL-SCNC: 106 MMOL/L (ref 96–108)
CO2 SERPL-SCNC: 20 MMOL/L (ref 21–32)
CREAT SERPL-MCNC: 0.88 MG/DL (ref 0.6–1.3)
CREAT UR-MCNC: 76.1 MG/DL
EOSINOPHIL # BLD AUTO: 0.03 THOUSAND/ÂΜL (ref 0–0.61)
EOSINOPHIL NFR BLD AUTO: 0 % (ref 0–6)
ERYTHROCYTE [DISTWIDTH] IN BLOOD BY AUTOMATED COUNT: 13.5 % (ref 11.6–15.1)
GFR SERPL CREATININE-BSD FRML MDRD: 90 ML/MIN/1.73SQ M
GLUCOSE SERPL-MCNC: 67 MG/DL (ref 65–140)
HCT VFR BLD AUTO: 40.3 % (ref 34.8–46.1)
HGB BLD-MCNC: 13.6 G/DL (ref 11.5–15.4)
IMM GRANULOCYTES # BLD AUTO: 0.04 THOUSAND/UL (ref 0–0.2)
IMM GRANULOCYTES NFR BLD AUTO: 0 % (ref 0–2)
LYMPHOCYTES # BLD AUTO: 1.45 THOUSANDS/ÂΜL (ref 0.6–4.47)
LYMPHOCYTES NFR BLD AUTO: 16 % (ref 14–44)
MCH RBC QN AUTO: 29.4 PG (ref 26.8–34.3)
MCHC RBC AUTO-ENTMCNC: 33.7 G/DL (ref 31.4–37.4)
MCV RBC AUTO: 87 FL (ref 82–98)
MONOCYTES # BLD AUTO: 0.39 THOUSAND/ÂΜL (ref 0.17–1.22)
MONOCYTES NFR BLD AUTO: 4 % (ref 4–12)
NEUTROPHILS # BLD AUTO: 7.45 THOUSANDS/ÂΜL (ref 1.85–7.62)
NEUTS SEG NFR BLD AUTO: 80 % (ref 43–75)
NRBC BLD AUTO-RTO: 0 /100 WBCS
PLATELET # BLD AUTO: 222 THOUSANDS/UL (ref 149–390)
PMV BLD AUTO: 10.6 FL (ref 8.9–12.7)
POTASSIUM SERPL-SCNC: 4.2 MMOL/L (ref 3.5–5.3)
PROT SERPL-MCNC: 6.7 G/DL (ref 6.4–8.4)
PROT UR-MCNC: 9 MG/DL
PROT/CREAT UR: 0.12 MG/G{CREAT} (ref 0–0.1)
RBC # BLD AUTO: 4.62 MILLION/UL (ref 3.81–5.12)
RH BLD: POSITIVE
SODIUM SERPL-SCNC: 135 MMOL/L (ref 135–147)
SPECIMEN EXPIRATION DATE: NORMAL
WBC # BLD AUTO: 9.37 THOUSAND/UL (ref 4.31–10.16)

## 2024-01-10 PROCEDURE — 99213 OFFICE O/P EST LOW 20 MIN: CPT | Performed by: OBSTETRICS & GYNECOLOGY

## 2024-01-10 PROCEDURE — 84156 ASSAY OF PROTEIN URINE: CPT | Performed by: OBSTETRICS & GYNECOLOGY

## 2024-01-10 PROCEDURE — 82570 ASSAY OF URINE CREATININE: CPT | Performed by: OBSTETRICS & GYNECOLOGY

## 2024-01-10 PROCEDURE — 80053 COMPREHEN METABOLIC PANEL: CPT | Performed by: OBSTETRICS & GYNECOLOGY

## 2024-01-10 PROCEDURE — 86850 RBC ANTIBODY SCREEN: CPT | Performed by: OBSTETRICS & GYNECOLOGY

## 2024-01-10 PROCEDURE — 99203 OFFICE O/P NEW LOW 30 MIN: CPT

## 2024-01-10 PROCEDURE — G0463 HOSPITAL OUTPT CLINIC VISIT: HCPCS

## 2024-01-10 PROCEDURE — 86900 BLOOD TYPING SEROLOGIC ABO: CPT | Performed by: OBSTETRICS & GYNECOLOGY

## 2024-01-10 PROCEDURE — 85025 COMPLETE CBC W/AUTO DIFF WBC: CPT | Performed by: OBSTETRICS & GYNECOLOGY

## 2024-01-10 PROCEDURE — 86901 BLOOD TYPING SEROLOGIC RH(D): CPT | Performed by: OBSTETRICS & GYNECOLOGY

## 2024-01-10 RX ORDER — MAGNESIUM SULFATE HEPTAHYDRATE 40 MG/ML
2 INJECTION, SOLUTION INTRAVENOUS ONCE
Qty: 50 ML | Refills: 0 | Status: DISCONTINUED | OUTPATIENT
Start: 2024-01-10 | End: 2024-01-10 | Stop reason: HOSPADM

## 2024-01-10 RX ORDER — METOCLOPRAMIDE HYDROCHLORIDE 5 MG/ML
10 INJECTION INTRAMUSCULAR; INTRAVENOUS ONCE
Qty: 2 ML | Refills: 0 | Status: DISCONTINUED | OUTPATIENT
Start: 2024-01-10 | End: 2024-01-10 | Stop reason: HOSPADM

## 2024-01-10 RX ORDER — DIPHENHYDRAMINE HYDROCHLORIDE 50 MG/ML
25 INJECTION INTRAMUSCULAR; INTRAVENOUS ONCE
Qty: 1 ML | Refills: 0 | Status: DISCONTINUED | OUTPATIENT
Start: 2024-01-10 | End: 2024-01-10 | Stop reason: HOSPADM

## 2024-01-10 RX ORDER — ACETAMINOPHEN 325 MG/1
975 TABLET ORAL ONCE
Qty: 3 TABLET | Refills: 0 | Status: COMPLETED | OUTPATIENT
Start: 2024-01-10 | End: 2024-01-10

## 2024-01-10 RX ADMIN — ACETAMINOPHEN 325MG 975 MG: 325 TABLET ORAL at 15:57

## 2024-01-10 NOTE — PROGRESS NOTES
L&D Triage Note - OB/GYN  Maxwell Crabtree 28 y.o. female MRN: [id number]  Unit/Bed#: L&D 329-02 Encounter: [id number]      ASSESSMENT:    Maxwell Crabtree is a 28 y.o.  at 38w3d presenting with headache and elevated blood pressure.  Her blood pressures have been mild range while in triage and headache mildly improving after taking Tylenol this morning, patient would like to try another dose of Tylenol and declines other medications for headache at this time.  Labs without evidence of preeclampsia with P/C ratio 0.12, platelets 222, AST 22, ALT 19, creatinine 0.88 at patient's baseline.  Blood pressures trended normotensive and patient felt improved after Tylenol.  She was stable for discharge today with return precautions given and recommendations to follow-up with her next prenatal appointment next week.    PLAN:    1) Elevated blood pressure and headache  -Pre-E labs normal  -Mild range blood pressures in triage trending normotensive, patient not yet meeting criteria for gHTN   -Received another dose of tylenol in triage with improvement, declined other headache medications  -No other signs or symptoms of preeclampsia    2) Discharge from OB triage with term labor precautions    - Reviewed rupture of membranes, false vs true labor, decreased fetal movement, and vaginal bleeding   - Pt to call provider with any concerns and follow up at her next scheduled prenatal appointment   - Continue routine prenatal care   - Case discussed with Dr. Toussaint-Foster    SUBJECTIVE:    Maxwell Crabtree 28 y.o.  at 38w3d with an Estimated Date of Delivery: 24 presenting with headache and elevated blood pressure reading on home cuff.  She does not have history of headache or prior high blood pressures in this pregnancy, but woke up today with a headache that is temporal on her right side, no visual changes, and took her blood pressure at home which she reports as being elevated 140s over 90s.  She repeated the  blood pressures at [facility] with one of their automatic cuffs and again had several mild range blood pressures.  She denies visual changes, chest pain, shortness of breath, abdominal pain, reports infrequent contractions, denies loss of fluid, vaginal bleeding, and reports good fetal movement.    Her current obstetrical history is significant for heterozygous for prothrombin [id number] mutation.    Contractions: present, irregular  Leakage of fluid: absent  Vaginal Bleeding: absent  Fetal movement: present    OBJECTIVE:    Vitals:    01/10/24 1503   BP: 127/85   Pulse: 85       ROS:  Constitutional: Headache  Respiratory: Negative  Cardiovascular: Negative    Gastrointestinal: Negative    General Physical Exam:  General: in no apparent distress  Cardiovascular: intact distals pulses  Lungs: non-labored breathing  Abdomen: abdomen is soft without significant tenderness, masses, organomegaly or guarding  Lower extremeties: nontender, trace edema      Fetal monitoring:  FHT:  140 bpm/ Moderate 6 - 25 bpm / 15 x 15 accelerations present, absent decelerations  [place]: contractions every 5-10 mins     Shawanda Barnes MD  OBGYN PGY-1  1/10/2024 3:27 PM

## 2024-01-10 NOTE — DISCHARGE INSTRUCTIONS
Pregnancy at 35 to 38 Weeks   WHAT YOU NEED TO KNOW:   What changes are happening with my body?  You are considered full term at the beginning of 37 weeks. Your breathing may be easier if your baby has moved down into a head-down position. You may need to urinate more often because the baby may be pressing on your bladder. You may also feel more discomfort and get tired easily.  How do I care for myself at this stage of my pregnancy?       Eat a variety of healthy foods.  Healthy foods include fruits, vegetables, whole-grain breads, low-fat dairy foods, beans, lean meats, and fish. Drink liquids as directed. Ask how much liquid to drink each day and which liquids are best for you. Limit caffeine to less than 200 milligrams each day. Limit your intake of fish to 2 servings each week. Choose fish low in mercury such as canned light tuna, shrimp, salmon, cod, or tilapia. Do not  eat fish high in mercury such as swordfish, tilefish, corby mackerel, and shark.         Take prenatal vitamins as directed.  Your need for certain vitamins and minerals, such as folic acid, increases during pregnancy. Prenatal vitamins provide some of the extra vitamins and minerals you need. Prenatal vitamins may also help to decrease the risk of certain birth defects.         Rest as needed.  Put your feet up if you have swelling in your ankles and feet.         Talk to your healthcare provider about exercise.  Moderate exercise can help you stay fit. Your healthcare provider will help you plan an exercise program that is safe for you during pregnancy.         Do not smoke.  Smoking increases your risk of a miscarriage and other health problems during your pregnancy. Smoking can cause your baby to be born early or weigh less at birth. Ask your healthcare provider for information if you need help quitting.    Do not drink alcohol.  Alcohol passes from your body to your baby through the placenta. It can affect your baby's brain development and  cause fetal alcohol syndrome (FAS). FAS is a group of conditions that causes mental, behavior, and growth problems.    Talk to your healthcare provider before you take any medicines.  Many medicines may harm your baby if you take them when you are pregnant. Do not take any medicines, vitamins, herbs, or supplements without first talking to your healthcare provider. Never use illegal or street drugs (such as marijuana or cocaine) while you are pregnant.    What are some safety tips during pregnancy?   Avoid hot tubs and saunas.  Do not use a hot tub or sauna while you are pregnant, especially during your first trimester. Hot tubs and saunas may raise your baby's temperature and increase the risk of birth defects.    Avoid toxoplasmosis.  This is an infection caused by eating raw meat or being around infected cat feces. It can cause birth defects, miscarriages, and other problems. Wash your hands after you touch raw meat. Make sure any meat is well-cooked before you eat it. Avoid raw eggs and unpasteurized milk. Use gloves or ask someone else to clean your cat's litter box while you are pregnant.         Ask your healthcare provider about travel.  The most comfortable time to travel is during the second trimester. Ask your provider if you can travel after 36 weeks. You may not be able to travel in an airplane after 36 weeks. He or she may also recommend you avoid long road trips.    What changes are happening with my baby?  By 38 weeks, your baby may weigh between 6 and 9 pounds. Your baby may be about 14 inches long from the top of the head to the rump (baby's bottom). Your baby hears well enough to know your voice. As your baby gets larger, you may feel fewer kicks and more stretching and rolling. Your baby may move into a head-down position. Your baby will also rest lower in your abdomen.  What do I need to know about prenatal care?  Your healthcare provider will check your blood pressure and weight. You may also  need the following:  A urine test  may also be done to check for sugar and protein. These can be signs of gestational diabetes or infection. Protein in your urine may also be a sign of preeclampsia. Preeclampsia is a condition that can develop during week 20 or later of your pregnancy. It causes high blood pressure, and it can cause problems with your kidneys and other organs.    A gestational diabetes screen  may be done. Your healthcare provider may order either a 1-step or 2-step oral glucose tolerance test (OGTT).     1-step OGTT:  Your blood sugar level will be tested after you have not eaten for 8 hours (fasting). You will then be given a glucose drink. Your level will be tested again 1 hour and 2 hours after you finish the drink.    2-step OGTT:  You do not have to fast for the first part of the test. You will have the glucose drink at any time of day. Your blood sugar level will be checked 1 hour later. If your blood sugar is higher than a certain level, another test will be ordered. You will fast and your blood sugar level will be tested. You will have the glucose drink. Your blood will be tested again 1 hour, 2 hours, and 3 hours after you finish the glucose drink.    A blood test  may be done to check for anemia (low iron level).    A Tdap vaccine  may be recommended by your healthcare provider.    A group B strep test  is a test that is done to check for group B strep infection. Group B strep is a type of bacteria that may be found in the vagina or rectum. It can be passed to your baby during delivery if you have it. Your healthcare provider will take swab your vagina or rectum and send the sample to the lab for tests.    Fundal height  is a measurement of your uterus to check your baby's growth. This number is usually the same as the number of weeks that you have been pregnant. Your healthcare provider may also check your baby's position.    Your baby's heart rate  will be checked.    When should I seek  immediate care?   You develop a severe headache that does not go away.    You have new or increased vision changes, such as blurred or spotted vision.    You have new or increased swelling in your face or hands.    You have vaginal spotting or bleeding.    Your water broke or you feel warm water gushing or trickling from your vagina.    When should I call my obstetrician?   You have more than 5 contractions in 1 hour.    You notice any changes in your baby's movements.    You have abdominal cramps, pressure, or tightening.    You have a change in vaginal discharge.    You have chills or a fever.    You have vaginal itching, burning, or pain.    You have yellow, green, white, or foul-smelling vaginal discharge.    You have pain or burning when you urinate, less urine than usual, or pink or bloody urine.    You have questions or concerns about your condition or care.    CARE AGREEMENT:   You have the right to help plan your care. Learn about your health condition and how it may be treated. Discuss treatment options with your healthcare providers to decide what care you want to receive. You always have the right to refuse treatment. The above information is an  only. It is not intended as medical advice for individual conditions or treatments. Talk to your doctor, nurse or pharmacist before following any medical regimen to see if it is safe and effective for you.  © Copyright [employer] 2022 Information is for End User's use only and may not be sold, redistributed or otherwise used for commercial purposes. All illustrations and images included in CareNotes® are the copyrighted property of [employer] or [employer]

## 2024-01-16 ENCOUNTER — ROUTINE PRENATAL (OUTPATIENT)
Dept: OBGYN CLINIC | Facility: MEDICAL CENTER | Age: 29
End: 2024-01-16

## 2024-01-16 ENCOUNTER — ANESTHESIA (INPATIENT)
Dept: LABOR AND DELIVERY | Facility: HOSPITAL | Age: 29
End: 2024-01-16
Payer: COMMERCIAL

## 2024-01-16 ENCOUNTER — HOSPITAL ENCOUNTER (INPATIENT)
Facility: HOSPITAL | Age: 29
LOS: 3 days | Discharge: HOME | End: 2024-01-19
Attending: OBSTETRICS & GYNECOLOGY | Admitting: OBSTETRICS & GYNECOLOGY
Payer: COMMERCIAL

## 2024-01-16 ENCOUNTER — ANESTHESIA EVENT (INPATIENT)
Dept: LABOR AND DELIVERY | Facility: HOSPITAL | Age: 29
End: 2024-01-16
Payer: COMMERCIAL

## 2024-01-16 VITALS — WEIGHT: 215.3 LBS | BODY MASS INDEX: 34.23 KG/M2 | SYSTOLIC BLOOD PRESSURE: 141 MMHG | DIASTOLIC BLOOD PRESSURE: 80 MMHG

## 2024-01-16 DIAGNOSIS — D68.52 PROTHROMBIN GENE MUTATION (HCC): ICD-10-CM

## 2024-01-16 DIAGNOSIS — Z98.891 STATUS POST PRIMARY LOW TRANSVERSE CESAREAN SECTION: Primary | ICD-10-CM

## 2024-01-16 DIAGNOSIS — Z3A.39 39 WEEKS GESTATION OF PREGNANCY: Primary | ICD-10-CM

## 2024-01-16 DIAGNOSIS — Z3A.38 38 WEEKS GESTATION OF PREGNANCY: ICD-10-CM

## 2024-01-16 DIAGNOSIS — O16.3 ELEVATED BLOOD PRESSURE COMPLICATING PREGNANCY, ANTEPARTUM, THIRD TRIMESTER: ICD-10-CM

## 2024-01-16 PROBLEM — Z34.90 ENCOUNTER FOR INDUCTION OF LABOR: Status: ACTIVE | Noted: 2024-01-16

## 2024-01-16 PROBLEM — O13.9 GESTATIONAL HYPERTENSION: Status: ACTIVE | Noted: 2024-01-05

## 2024-01-16 LAB
ALBUMIN SERPL BCP-MCNC: 3.6 G/DL (ref 3.5–5)
ALP SERPL-CCNC: 221 U/L (ref 34–104)
ALT SERPL W P-5'-P-CCNC: 20 U/L (ref 7–52)
ANION GAP SERPL CALCULATED.3IONS-SCNC: 8 MMOL/L
AST SERPL W P-5'-P-CCNC: 21 U/L (ref 13–39)
BILIRUB SERPL-MCNC: 0.3 MG/DL (ref 0.2–1)
BUN SERPL-MCNC: 14 MG/DL (ref 5–25)
CALCIUM SERPL-MCNC: 9.7 MG/DL (ref 8.4–10.2)
CHLORIDE SERPL-SCNC: 104 MMOL/L (ref 96–108)
CO2 SERPL-SCNC: 22 MMOL/L (ref 21–32)
CREAT SERPL-MCNC: 0.85 MG/DL (ref 0.6–1.3)
CREAT UR-MCNC: 125.2 MG/DL
ERYTHROCYTE [DISTWIDTH] IN BLOOD BY AUTOMATED COUNT: 13.8 % (ref 11.6–15.1)
GFR SERPL CREATININE-BSD FRML MDRD: 94 ML/MIN/1.73SQ M
GLUCOSE SERPL-MCNC: 81 MG/DL (ref 65–140)
HCT VFR BLD AUTO: 40.1 % (ref 34.8–46.1)
HGB BLD-MCNC: 13.3 G/DL (ref 11.5–15.4)
MCH RBC QN AUTO: 29.6 PG (ref 26.8–34.3)
MCHC RBC AUTO-ENTMCNC: 33.2 G/DL (ref 31.4–37.4)
MCV RBC AUTO: 89 FL (ref 82–98)
PLATELET # BLD AUTO: 217 THOUSANDS/UL (ref 149–390)
PMV BLD AUTO: 10.7 FL (ref 8.9–12.7)
POTASSIUM SERPL-SCNC: 4 MMOL/L (ref 3.5–5.3)
PROT SERPL-MCNC: 6.6 G/DL (ref 6.4–8.4)
PROT UR-MCNC: 14 MG/DL
PROT/CREAT UR: 0.11 MG/G{CREAT} (ref 0–0.1)
RBC # BLD AUTO: 4.49 MILLION/UL (ref 3.81–5.12)
SODIUM SERPL-SCNC: 134 MMOL/L (ref 135–147)
TREPONEMA PALLIDUM IGG+IGM AB [PRESENCE] IN SERUM OR PLASMA BY IMMUNOASSAY: NORMAL
WBC # BLD AUTO: 9.85 THOUSAND/UL (ref 4.31–10.16)

## 2024-01-16 PROCEDURE — 84156 ASSAY OF PROTEIN URINE: CPT

## 2024-01-16 PROCEDURE — 10907ZC DRAINAGE OF AMNIOTIC FLUID, THERAPEUTIC FROM PRODUCTS OF CONCEPTION, VIA NATURAL OR ARTIFICIAL OPENING: ICD-10-PCS | Performed by: OBSTETRICS & GYNECOLOGY

## 2024-01-16 PROCEDURE — 86780 TREPONEMA PALLIDUM: CPT

## 2024-01-16 PROCEDURE — 82570 ASSAY OF URINE CREATININE: CPT

## 2024-01-16 PROCEDURE — PNV: Performed by: OBSTETRICS & GYNECOLOGY

## 2024-01-16 PROCEDURE — NC001 PR NO CHARGE: Performed by: OBSTETRICS & GYNECOLOGY

## 2024-01-16 PROCEDURE — 85027 COMPLETE CBC AUTOMATED: CPT

## 2024-01-16 PROCEDURE — 4A1HXCZ MONITORING OF PRODUCTS OF CONCEPTION, CARDIAC RATE, EXTERNAL APPROACH: ICD-10-PCS | Performed by: OBSTETRICS & GYNECOLOGY

## 2024-01-16 PROCEDURE — 80053 COMPREHEN METABOLIC PANEL: CPT

## 2024-01-16 RX ORDER — ROPIVACAINE HYDROCHLORIDE 5 MG/ML
INJECTION, SOLUTION EPIDURAL; INFILTRATION; PERINEURAL AS NEEDED
Status: DISCONTINUED | OUTPATIENT
Start: 2024-01-16 | End: 2024-01-17

## 2024-01-16 RX ORDER — OXYTOCIN/RINGER'S LACTATE 30/500 ML
1-30 PLASTIC BAG, INJECTION (ML) INTRAVENOUS
Status: DISCONTINUED | OUTPATIENT
Start: 2024-01-16 | End: 2024-01-19 | Stop reason: HOSPADM

## 2024-01-16 RX ORDER — LIDOCAINE HYDROCHLORIDE AND EPINEPHRINE 15; 5 MG/ML; UG/ML
INJECTION, SOLUTION EPIDURAL
Status: COMPLETED | OUTPATIENT
Start: 2024-01-16 | End: 2024-01-16

## 2024-01-16 RX ORDER — ONDANSETRON 2 MG/ML
4 INJECTION INTRAMUSCULAR; INTRAVENOUS EVERY 4 HOURS PRN
Status: DISCONTINUED | OUTPATIENT
Start: 2024-01-16 | End: 2024-01-19 | Stop reason: HOSPADM

## 2024-01-16 RX ORDER — BUPIVACAINE HYDROCHLORIDE 2.5 MG/ML
30 INJECTION, SOLUTION EPIDURAL; INFILTRATION; INTRACAUDAL ONCE AS NEEDED
Status: DISCONTINUED | OUTPATIENT
Start: 2024-01-16 | End: 2024-01-17

## 2024-01-16 RX ORDER — METOCLOPRAMIDE HYDROCHLORIDE 5 MG/ML
10 INJECTION INTRAMUSCULAR; INTRAVENOUS EVERY 6 HOURS PRN
Status: DISCONTINUED | OUTPATIENT
Start: 2024-01-16 | End: 2024-01-19 | Stop reason: HOSPADM

## 2024-01-16 RX ORDER — SODIUM CHLORIDE, SODIUM LACTATE, POTASSIUM CHLORIDE, CALCIUM CHLORIDE 600; 310; 30; 20 MG/100ML; MG/100ML; MG/100ML; MG/100ML
125 INJECTION, SOLUTION INTRAVENOUS CONTINUOUS
Status: DISCONTINUED | OUTPATIENT
Start: 2024-01-16 | End: 2024-01-17

## 2024-01-16 RX ORDER — ROPIVACAINE HYDROCHLORIDE 2 MG/ML
INJECTION, SOLUTION EPIDURAL; INFILTRATION; PERINEURAL CONTINUOUS PRN
Status: DISCONTINUED | OUTPATIENT
Start: 2024-01-16 | End: 2024-01-17

## 2024-01-16 RX ORDER — LIDOCAINE HCL/EPINEPHRINE/PF 2%-1:200K
VIAL (ML) INJECTION AS NEEDED
Status: DISCONTINUED | OUTPATIENT
Start: 2024-01-16 | End: 2024-01-17

## 2024-01-16 RX ADMIN — ROPIVACAINE HYDROCHLORIDE: 2 INJECTION, SOLUTION EPIDURAL; INFILTRATION at 22:45

## 2024-01-16 RX ADMIN — ROPIVACAINE HYDROCHLORIDE 5 ML: 5 INJECTION, SOLUTION EPIDURAL; INFILTRATION; PERINEURAL at 20:08

## 2024-01-16 RX ADMIN — ROPIVACAINE HYDROCHLORIDE: 2 INJECTION, SOLUTION EPIDURAL; INFILTRATION at 21:11

## 2024-01-16 RX ADMIN — LIDOCAINE HYDROCHLORIDE AND EPINEPHRINE 5 ML: 15; 5 INJECTION, SOLUTION EPIDURAL at 22:35

## 2024-01-16 RX ADMIN — ROPIVACAINE HYDROCHLORIDE 4 ML: 2 INJECTION, SOLUTION EPIDURAL; INFILTRATION at 21:56

## 2024-01-16 RX ADMIN — METOCLOPRAMIDE 10 MG: 5 INJECTION, SOLUTION INTRAMUSCULAR; INTRAVENOUS at 21:09

## 2024-01-16 RX ADMIN — Medication 2 MILLI-UNITS/MIN: at 14:37

## 2024-01-16 RX ADMIN — SODIUM CHLORIDE, SODIUM LACTATE, POTASSIUM CHLORIDE, AND CALCIUM CHLORIDE 999 ML/HR: .6; .31; .03; .02 INJECTION, SOLUTION INTRAVENOUS at 19:32

## 2024-01-16 RX ADMIN — ROPIVACAINE HYDROCHLORIDE 5 ML: 5 INJECTION, SOLUTION EPIDURAL; INFILTRATION; PERINEURAL at 20:12

## 2024-01-16 RX ADMIN — LIDOCAINE HYDROCHLORIDE,EPINEPHRINE BITARTRATE 5 ML: 20; .005 INJECTION, SOLUTION EPIDURAL; INFILTRATION; INTRACAUDAL; PERINEURAL at 20:06

## 2024-01-16 RX ADMIN — SODIUM CHLORIDE, SODIUM LACTATE, POTASSIUM CHLORIDE, AND CALCIUM CHLORIDE 125 ML/HR: .6; .31; .03; .02 INJECTION, SOLUTION INTRAVENOUS at 15:53

## 2024-01-16 RX ADMIN — ROPIVACAINE HYDROCHLORIDE 8 ML/HR: 2 INJECTION, SOLUTION EPIDURAL; INFILTRATION at 20:13

## 2024-01-16 RX ADMIN — ROPIVACAINE HYDROCHLORIDE 4 ML: 5 INJECTION, SOLUTION EPIDURAL; INFILTRATION; PERINEURAL at 21:56

## 2024-01-16 RX ADMIN — ONDANSETRON 4 MG: 2 INJECTION INTRAMUSCULAR; INTRAVENOUS at 19:56

## 2024-01-16 RX ADMIN — SODIUM CHLORIDE, SODIUM LACTATE, POTASSIUM CHLORIDE, AND CALCIUM CHLORIDE 125 ML/HR: .6; .31; .03; .02 INJECTION, SOLUTION INTRAVENOUS at 14:36

## 2024-01-16 NOTE — OB LABOR/OXYTOCIN SAFETY PROGRESS
Labor Progress Note - Maxwell Crabtree 28 y.o. female MRN: [id number]    Unit/Bed#: L&D 323-01 Encounter: [id number]       Contraction Frequency (minutes): 1-6  Contraction Intensity: Mild/Moderate  Uterine Activity Characteristics: Irregular  Cervical Dilation: 2-3        Cervical Effacement: 50  Fetal Station: -3  Baseline Rate (FHR): 140 bpm  Fetal Heart Rate (FHT): 140 BPM  FHR Category: 1               Vital Signs:   There were no vitals filed for this visit.    Patient comfortable, experiencing minimal pain with contractions. Now amenable to Casarez balloon, which was placed per procedure note below. SVE as above. FHT cat 1. Arnie irregularly q1-6min.     Casarez placement:  A 24F casarez with a 30cc balloon was selected. SVE was performed and cervix was located. Casarez was introduced over sterile gloved hands. Balloon advanced through cervix beyond the internal cervical os. A small amount amount of sterile normal saline solution was instilled in the balloon to confirm placement. Placement was confirmed to be beyond the internal cervical os. A total of 60cc of sterile normal saline solution was instilled into the balloon. Patient tolerated well. Instructions left with RN to place casarez to gravity with a 1L bag of IV fluid. Notify MD when casarez dislodged.    D/w Dr. Banegas.       Johana Ayon MD 1/16/2024 2:08 PM

## 2024-01-16 NOTE — ASSESSMENT & PLAN NOTE
Up to date on prenatal care  O+, GBS neg  EFW 35%ile (32w4d)  Newly diagnosed gHTN  History of prothrombin gene mutation  Admit for IOL

## 2024-01-16 NOTE — ASSESSMENT & PLAN NOTE
Admission CBC, CMP wnl; urine PC 0.11  Currently asymptomatic  Systolic (12hrs), Av , Min:133 , Max:142   Diastolic (12hrs), Av, Min:76, Max:82  Continue to monitor BP

## 2024-01-16 NOTE — OB LABOR/OXYTOCIN SAFETY PROGRESS
Oxytocin Safety Progress Check Note - Maxewll Crabtree 28 y.o. female MRN: [id number]    Unit/Bed#: L&D 323-01 Encounter: [id number]    Dose (tray-units/min) Oxytocin: 2 tray-units/min  Contraction Frequency (minutes): 2-3  Contraction Intensity: Moderate  Uterine Activity Characteristics: Regular  Cervical Dilation: 4        Cervical Effacement: 60  Fetal Station: -3  Baseline Rate (FHR): 135 bpm  Fetal Heart Rate (FHT): 140 BPM  FHR Category: 2               Vital Signs:   Vitals:    01/16/24 1442   BP: 118/60   Pulse: 68       Notes/comments:   Late decelerations noted on FHT. At bedside to assess patient.  Maxwell is standing at the bedside, uncomfortable with contractions. Pitocin titration held in the setting of late decelerations. Rodriguez balloon dislodged. SVE 4/60/-3. Fluid bolus initiated. Pitocin titration not restarted at this time secondary to audible variable decel while in room. FHT noted to return to baseline.     Dr. Banegas updated     Romelia Hyman MD 1/16/2024 3:05 PM

## 2024-01-16 NOTE — PROGRESS NOTES
Maxwell is a 28 y.o. year old  at 39w2d for routine prenatal visit.   + FM, no vaginal bleeding, contractions, or LOF  Complaints: No   Elevated  BP  - repeat  152/90 - sent for admission for at least GHTN at 39 weeks   We discussed IOL process / risks and benefits reviewed   All questions answered  L&D notified   Most recent ultrasound and labs reviewed.

## 2024-01-16 NOTE — PROGRESS NOTES
Steady pulling placed on casarez balloon catheter.  Moderate bleeding noted with one small clot passed.  Physician aware.  Casarez balloon remains in place.

## 2024-01-16 NOTE — H&P
"H & P- Obstetrics   Maxwell Crabtree 28 y.o. female MRN: [id number]  Unit/Bed#: L&D 323-01 Encounter: [id number]    Assessment: 28 y.o.  at 39w2d admitted for IOL in setting of elevated blood pressures without diagnosis at term.  SVE: 2.5/50/-3  FHT: cat1  Clinical EFW: 35%ile at 32w4d; Cephalic confirmed by TAUS  GBS status: negative     Plan:   Encounter for induction of labor  Assessment & Plan  Admitted for IOL in setting of newly diagnosed gestational hypertension  Admission labs - CBC, CMP, T&S, syphilis screen, urine P:C ratio  Clear liquid diet  Anesthesia consult placed   Rh+, Rhogam not indicated   GBS-, prophylaxis not indicated  Induction plan: pitocin      Gestational hypertension  Assessment & Plan  First elevated BP of pregnancy at 38w3d, now meets criteria for gestational hypertension at 39w2d  Currently asymptomatic  Admit for IOL  F/u admission CBC, CMP, urine P:C    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 Banegas.      Chief Complaint: \"I was told to come in to be induced.\"    HPI: Maxwell Crabtree is a 28 y.o.  with an JOSE ELIAS of 2024, by Last Menstrual Period at 39w2d who is being admitted for IOL after having elevated BP in the office today during routine PNV. She denies having uterine contractions, has no LOF, and reports no VB. She states she has felt good FM.    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   Respiratory:  Negative for cough and shortness of breath.    Gastrointestinal:  Negative for diarrhea, nausea and vomiting.   Genitourinary:  Negative for dysuria and hematuria.   Skin:  Negative for color change and rash.   Neurological:  Negative for dizziness, syncope and headaches.       OB Hx:  OB History    Para Term  AB Living   2 0 0 0 1 0   SAB IAB Ectopic Multiple Live Births   1 0 0 0 0      # Outcome Date GA Lbr [place]/2nd Weight Sex Delivery Anes PTL Lv   2 Current            1 SAB               Obstetric Comments   Menarche age 11   bcp-8yrs       Past Medical Hx:  Past Medical History:   Diagnosis Date    ADHD     Asthma     Fibroadenoma of breast, left, right     Prothrombin gene mutation (HCC)        Past Surgical hx:  Past Surgical History:   Procedure Laterality Date    WISDOM TOOTH EXTRACTION      WISDOM TOOTH EXTRACTION         Social Hx:  Alcohol use: denies  Tobacco use: denies  Other substance use: denies    Allergies   Allergen Reactions    Shellfish-Derived Products - Food Allergy Swelling       Medications Prior to Admission   Medication    Prenatal Vit w/Zs-Jiwybvetd-HC (PNV PO)       Objective:  BP: (141)/(80) 141/80  There is no height or weight on file to calculate BMI.     Physical Exam:  Physical Exam  Constitutional:       Appearance: Normal appearance.   Genitourinary:      Vulva normal.   Cardiovascular:      Rate and Rhythm: Normal rate.   Pulmonary:      Effort: Pulmonary effort is normal.   Abdominal:      Palpations: Abdomen is soft.   Neurological:      General: No focal deficit present.      Mental Status: She is alert and oriented to person, place, and time.   Skin:     General: Skin is dry.   Psychiatric:         Mood and Affect:  Mood normal.            FHT:  Baseline Rate (FHR): 155 bpm  Variability: Moderate  Accelerations: 15 x 15 or greater  Decelerations: None  FHR Category: Category I    TOCO:   Contraction Frequency (minutes): occasional  Contraction Intensity: Mild    Lab Results   Component Value Date    WBC 9.85 01/16/2024    HGB 13.3 01/16/2024    HCT 40.1 01/16/2024     01/16/2024     Lab Results   Component Value Date    K 4.2 01/10/2024     01/10/2024    CO2 20 (L) 01/10/2024    BUN 13 01/10/2024    CREATININE 0.88 01/10/2024    AST 22 01/10/2024    ALT 19 01/10/2024     Prenatal Labs: Reviewed      Blood type: O+  Antibody: negative  GBS: negative  HIV: nonreactive  Rubella: immune  Syphilis IgM/IgG: nonreactive  HBsAg: nonreactive  HCAb: nonreactive  Chlamydia: negative  Gonorrhea: negative  Diabetes 1 hour screen: passed  3 hour glucose: n/a  Platelets: 222, pending admission CBC  Hgb: 13.6, pending admission CBC  >2 Midnights  INPATIENT     Signature/Title: Johana Ayon MD  Date: 1/16/2024  Time: 12:51 PM

## 2024-01-16 NOTE — OB LABOR/OXYTOCIN SAFETY PROGRESS
Oxytocin Safety Progress Check Note - Maxwell Crabtree 28 y.o. female MRN: [id number]    Unit/Bed#: L&D 323-01 Encounter: [id number]    Dose (tray-units/min) Oxytocin: 6 tray-units/min  Contraction Frequency (minutes): 2-3  Contraction Intensity: Moderate  Uterine Activity Characteristics: Irregular  Cervical Dilation: 5        Cervical Effacement: 60  Fetal Station: -3  Baseline Rate (FHR): 145 bpm  Fetal Heart Rate (FHT): 140 BPM  FHR Category: 1               Vital Signs:   Vitals:    01/16/24 1705   BP: 124/68   Pulse: 82   Resp:    Temp:        Notes/comments:   Patient feeling contractions more. AROM for clear fluid. SVE as above. Continue pitocin titration. Dr. Makenzie Muniz MD 1/16/2024 6:29 PM

## 2024-01-16 NOTE — ASSESSMENT & PLAN NOTE
Admitted for IOL in setting of newly diagnosed gestational hypertension  Admission labs - CBC, CMP, T&S, syphilis screen, urine P:C ratio  Clear liquid diet  Anesthesia consult placed   Rh+, Rhogam not indicated   GBS-, prophylaxis not indicated  Induction plan: pitocin

## 2024-01-16 NOTE — PROGRESS NOTES
Steady pulling placed on casarez balloon catheter.  Small amount of bleeding noted.  Catheter remains in place.

## 2024-01-16 NOTE — PLAN OF CARE
Problem: ANTEPARTUM  Goal: Maintain pregnancy as long as maternal and/or fetal condition is stable  Description: INTERVENTIONS:  - Maternal surveillance  - Fetal surveillance  - Monitor uterine activity  - Medications as ordered  - Bedrest  Outcome: Progressing     Problem: BIRTH - VAGINAL/ SECTION  Goal: Fetal and maternal status remain reassuring during the birth process  Description: INTERVENTIONS:  - Monitor vital signs  - Monitor fetal heart rate  - Monitor uterine activity  - Monitor labor progression (vaginal delivery)  - DVT prophylaxis  - Antibiotic prophylaxis  Outcome: Progressing  Goal: Emotionally satisfying birthing experience for mother/fetus  Description: Interventions:  - Assess, plan, implement and evaluate the nursing care given to the patient in labor  - Advocate the philosophy that each childbirth experience is a unique experience and support the family's chosen level of involvement and control during the labor process   - Actively participate in both the patient's and family's teaching of the birth process  - Consider cultural, Adventism and age-specific factors and plan care for the patient in labor  Outcome: Progressing

## 2024-01-17 PROBLEM — Z98.891 STATUS POST PRIMARY LOW TRANSVERSE CESAREAN SECTION: Status: ACTIVE | Noted: 2024-01-17

## 2024-01-17 LAB
BASE EXCESS BLDCOA CALC-SCNC: -5.7 MMOL/L (ref 3–11)
BASE EXCESS BLDCOV CALC-SCNC: -3.8 MMOL/L (ref 1–9)
BASOPHILS # BLD AUTO: 0.02 THOUSANDS/ÂΜL (ref 0–0.1)
BASOPHILS NFR BLD AUTO: 0 % (ref 0–1)
EOSINOPHIL # BLD AUTO: 0.01 THOUSAND/ÂΜL (ref 0–0.61)
EOSINOPHIL NFR BLD AUTO: 0 % (ref 0–6)
ERYTHROCYTE [DISTWIDTH] IN BLOOD BY AUTOMATED COUNT: 13.7 % (ref 11.6–15.1)
HCO3 BLDCOA-SCNC: 21.7 MMOL/L (ref 17.3–27.3)
HCO3 BLDCOV-SCNC: 23.3 MMOL/L (ref 12.2–28.6)
HCT VFR BLD AUTO: 36.5 % (ref 34.8–46.1)
HGB BLD-MCNC: 12.4 G/DL (ref 11.5–15.4)
IMM GRANULOCYTES # BLD AUTO: 0.05 THOUSAND/UL (ref 0–0.2)
IMM GRANULOCYTES NFR BLD AUTO: 0 % (ref 0–2)
LYMPHOCYTES # BLD AUTO: 1.73 THOUSANDS/ÂΜL (ref 0.6–4.47)
LYMPHOCYTES NFR BLD AUTO: 14 % (ref 14–44)
MCH RBC QN AUTO: 30.2 PG (ref 26.8–34.3)
MCHC RBC AUTO-ENTMCNC: 34 G/DL (ref 31.4–37.4)
MCV RBC AUTO: 89 FL (ref 82–98)
MONOCYTES # BLD AUTO: 0.62 THOUSAND/ÂΜL (ref 0.17–1.22)
MONOCYTES NFR BLD AUTO: 5 % (ref 4–12)
NEUTROPHILS # BLD AUTO: 9.61 THOUSANDS/ÂΜL (ref 1.85–7.62)
NEUTS SEG NFR BLD AUTO: 81 % (ref 43–75)
NRBC BLD AUTO-RTO: 0 /100 WBCS
O2 CT VFR BLDCOA CALC: <10 ML/DL
OXYHGB MFR BLDCOA: 8.4 %
OXYHGB MFR BLDCOV: 21.1 %
PCO2 BLDCOA: 49.5 MM[HG] (ref 30–60)
PCO2 BLDCOV: 49.6 MM HG (ref 27–43)
PH BLDCOA: 7.26 [PH] (ref 7.23–7.43)
PH BLDCOV: 7.29 [PH] (ref 7.19–7.49)
PLATELET # BLD AUTO: 203 THOUSANDS/UL (ref 149–390)
PMV BLD AUTO: 10.4 FL (ref 8.9–12.7)
PO2 BLDCOA: <10 MM HG (ref 5–25)
PO2 BLDCOV: 14.4 MM HG (ref 15–45)
RBC # BLD AUTO: 4.11 MILLION/UL (ref 3.81–5.12)
SAO2 % BLDCOV: 4.6 ML/DL
WBC # BLD AUTO: 12.04 THOUSAND/UL (ref 4.31–10.16)

## 2024-01-17 PROCEDURE — 85025 COMPLETE CBC W/AUTO DIFF WBC: CPT | Performed by: OBSTETRICS & GYNECOLOGY

## 2024-01-17 PROCEDURE — 99024 POSTOP FOLLOW-UP VISIT: CPT | Performed by: NURSE PRACTITIONER

## 2024-01-17 PROCEDURE — 82805 BLOOD GASES W/O2 SATURATION: CPT | Performed by: OBSTETRICS & GYNECOLOGY

## 2024-01-17 PROCEDURE — 59510 CESAREAN DELIVERY: CPT | Performed by: OBSTETRICS & GYNECOLOGY

## 2024-01-17 RX ORDER — KETOROLAC TROMETHAMINE 30 MG/ML
INJECTION, SOLUTION INTRAMUSCULAR; INTRAVENOUS AS NEEDED
Status: DISCONTINUED | OUTPATIENT
Start: 2024-01-17 | End: 2024-01-17

## 2024-01-17 RX ORDER — METOCLOPRAMIDE HYDROCHLORIDE 5 MG/ML
10 INJECTION INTRAMUSCULAR; INTRAVENOUS EVERY 6 HOURS PRN
Status: DISCONTINUED | OUTPATIENT
Start: 2024-01-17 | End: 2024-01-17 | Stop reason: SDUPTHER

## 2024-01-17 RX ORDER — ENOXAPARIN SODIUM 100 MG/ML
40 INJECTION SUBCUTANEOUS
Status: DISCONTINUED | OUTPATIENT
Start: 2024-01-17 | End: 2024-01-17

## 2024-01-17 RX ORDER — ONDANSETRON 2 MG/ML
INJECTION INTRAMUSCULAR; INTRAVENOUS
Status: COMPLETED
Start: 2024-01-17 | End: 2024-01-17

## 2024-01-17 RX ORDER — FENTANYL CITRATE/PF 50 MCG/ML
50 SYRINGE (ML) INJECTION
Status: DISCONTINUED | OUTPATIENT
Start: 2024-01-17 | End: 2024-01-19 | Stop reason: HOSPADM

## 2024-01-17 RX ORDER — PHENYLEPHRINE HCL IN 0.9% NACL 1 MG/10 ML
SYRINGE (ML) INTRAVENOUS
Status: DISPENSED
Start: 2024-01-17 | End: 2024-01-17

## 2024-01-17 RX ORDER — SODIUM CHLORIDE, SODIUM LACTATE, POTASSIUM CHLORIDE, CALCIUM CHLORIDE 600; 310; 30; 20 MG/100ML; MG/100ML; MG/100ML; MG/100ML
125 INJECTION, SOLUTION INTRAVENOUS CONTINUOUS
Status: DISCONTINUED | OUTPATIENT
Start: 2024-01-17 | End: 2024-01-19 | Stop reason: HOSPADM

## 2024-01-17 RX ORDER — ONDANSETRON 2 MG/ML
4 INJECTION INTRAMUSCULAR; INTRAVENOUS ONCE AS NEEDED
Status: DISCONTINUED | OUTPATIENT
Start: 2024-01-17 | End: 2024-01-17 | Stop reason: SDUPTHER

## 2024-01-17 RX ORDER — SIMETHICONE 80 MG
80 TABLET,CHEWABLE ORAL 4 TIMES DAILY PRN
Status: DISCONTINUED | OUTPATIENT
Start: 2024-01-17 | End: 2024-01-19 | Stop reason: HOSPADM

## 2024-01-17 RX ORDER — OXYCODONE HYDROCHLORIDE 5 MG/1
5 TABLET ORAL EVERY 4 HOURS PRN
Status: DISCONTINUED | OUTPATIENT
Start: 2024-01-17 | End: 2024-01-17 | Stop reason: SDUPTHER

## 2024-01-17 RX ORDER — OXYTOCIN/RINGER'S LACTATE 30/500 ML
62.5 PLASTIC BAG, INJECTION (ML) INTRAVENOUS ONCE
Status: DISCONTINUED | OUTPATIENT
Start: 2024-01-17 | End: 2024-01-19 | Stop reason: HOSPADM

## 2024-01-17 RX ORDER — HYDROMORPHONE HCL/PF 1 MG/ML
0.5 SYRINGE (ML) INJECTION
Status: DISCONTINUED | OUTPATIENT
Start: 2024-01-17 | End: 2024-01-19 | Stop reason: HOSPADM

## 2024-01-17 RX ORDER — DOCUSATE SODIUM 100 MG/1
100 CAPSULE, LIQUID FILLED ORAL 2 TIMES DAILY
Status: DISCONTINUED | OUTPATIENT
Start: 2024-01-17 | End: 2024-01-19 | Stop reason: HOSPADM

## 2024-01-17 RX ORDER — ENOXAPARIN SODIUM 100 MG/ML
40 INJECTION SUBCUTANEOUS
Status: DISCONTINUED | OUTPATIENT
Start: 2024-01-18 | End: 2024-01-19 | Stop reason: HOSPADM

## 2024-01-17 RX ORDER — OXYTOCIN/RINGER'S LACTATE 30/500 ML
PLASTIC BAG, INJECTION (ML) INTRAVENOUS
Status: COMPLETED
Start: 2024-01-17 | End: 2024-01-17

## 2024-01-17 RX ORDER — IBUPROFEN 600 MG/1
600 TABLET ORAL EVERY 6 HOURS
Status: DISCONTINUED | OUTPATIENT
Start: 2024-01-18 | End: 2024-01-19 | Stop reason: HOSPADM

## 2024-01-17 RX ORDER — LIDOCAINE HCL/EPINEPHRINE/PF 2%-1:200K
VIAL (ML) INJECTION
Status: COMPLETED
Start: 2024-01-17 | End: 2024-01-17

## 2024-01-17 RX ORDER — KETOROLAC TROMETHAMINE 30 MG/ML
30 INJECTION, SOLUTION INTRAMUSCULAR; INTRAVENOUS EVERY 6 HOURS
Status: DISCONTINUED | OUTPATIENT
Start: 2024-01-17 | End: 2024-01-17 | Stop reason: SDUPTHER

## 2024-01-17 RX ORDER — NALOXONE HYDROCHLORIDE 0.4 MG/ML
0.1 INJECTION, SOLUTION INTRAMUSCULAR; INTRAVENOUS; SUBCUTANEOUS
Status: ACTIVE | OUTPATIENT
Start: 2024-01-17 | End: 2024-01-18

## 2024-01-17 RX ORDER — KETOROLAC TROMETHAMINE 30 MG/ML
INJECTION, SOLUTION INTRAMUSCULAR; INTRAVENOUS
Status: COMPLETED
Start: 2024-01-17 | End: 2024-01-17

## 2024-01-17 RX ORDER — ACETAMINOPHEN 325 MG/1
650 TABLET ORAL EVERY 6 HOURS SCHEDULED
Status: DISCONTINUED | OUTPATIENT
Start: 2024-01-17 | End: 2024-01-18

## 2024-01-17 RX ORDER — OXYCODONE HYDROCHLORIDE 5 MG/1
5 TABLET ORAL EVERY 4 HOURS PRN
Status: DISCONTINUED | OUTPATIENT
Start: 2024-01-17 | End: 2024-01-19 | Stop reason: HOSPADM

## 2024-01-17 RX ORDER — DIPHENHYDRAMINE HYDROCHLORIDE 50 MG/ML
25 INJECTION INTRAMUSCULAR; INTRAVENOUS EVERY 6 HOURS PRN
Status: DISCONTINUED | OUTPATIENT
Start: 2024-01-17 | End: 2024-01-19 | Stop reason: HOSPADM

## 2024-01-17 RX ORDER — MORPHINE SULFATE 0.5 MG/ML
INJECTION, SOLUTION EPIDURAL; INTRATHECAL; INTRAVENOUS
Status: COMPLETED
Start: 2024-01-17 | End: 2024-01-17

## 2024-01-17 RX ORDER — CEFAZOLIN SODIUM 2 G/50ML
2000 SOLUTION INTRAVENOUS ONCE
Status: COMPLETED | OUTPATIENT
Start: 2024-01-17 | End: 2024-01-17

## 2024-01-17 RX ORDER — BENZOCAINE/MENTHOL 6 MG-10 MG
LOZENGE MUCOUS MEMBRANE DAILY PRN
Status: DISCONTINUED | OUTPATIENT
Start: 2024-01-17 | End: 2024-01-19 | Stop reason: HOSPADM

## 2024-01-17 RX ORDER — OXYCODONE HYDROCHLORIDE 5 MG/1
10 TABLET ORAL EVERY 4 HOURS PRN
Status: DISCONTINUED | OUTPATIENT
Start: 2024-01-17 | End: 2024-01-19 | Stop reason: HOSPADM

## 2024-01-17 RX ORDER — METOCLOPRAMIDE HYDROCHLORIDE 5 MG/ML
INJECTION INTRAMUSCULAR; INTRAVENOUS AS NEEDED
Status: DISCONTINUED | OUTPATIENT
Start: 2024-01-17 | End: 2024-01-17

## 2024-01-17 RX ORDER — MORPHINE SULFATE 0.5 MG/ML
INJECTION, SOLUTION EPIDURAL; INTRATHECAL; INTRAVENOUS AS NEEDED
Status: DISCONTINUED | OUTPATIENT
Start: 2024-01-17 | End: 2024-01-17

## 2024-01-17 RX ORDER — KETOROLAC TROMETHAMINE 30 MG/ML
30 INJECTION, SOLUTION INTRAMUSCULAR; INTRAVENOUS EVERY 6 HOURS SCHEDULED
Status: DISPENSED | OUTPATIENT
Start: 2024-01-17 | End: 2024-01-18

## 2024-01-17 RX ORDER — ENOXAPARIN SODIUM 100 MG/ML
40 INJECTION SUBCUTANEOUS DAILY
Status: DISCONTINUED | OUTPATIENT
Start: 2024-01-17 | End: 2024-01-17

## 2024-01-17 RX ADMIN — MORPHINE SULFATE 3 MG: 0.5 INJECTION, SOLUTION EPIDURAL; INTRATHECAL; INTRAVENOUS at 01:06

## 2024-01-17 RX ADMIN — MORPHINE SULFATE 2 MG: 0.5 INJECTION, SOLUTION EPIDURAL; INTRATHECAL; INTRAVENOUS at 01:08

## 2024-01-17 RX ADMIN — DIPHENHYDRAMINE HYDROCHLORIDE 25 MG: 50 INJECTION, SOLUTION INTRAMUSCULAR; INTRAVENOUS at 04:30

## 2024-01-17 RX ADMIN — LIDOCAINE HYDROCHLORIDE,EPINEPHRINE BITARTRATE 5 ML: 20; .005 INJECTION, SOLUTION EPIDURAL; INFILTRATION; INTRACAUDAL; PERINEURAL at 00:53

## 2024-01-17 RX ADMIN — AZITHROMYCIN 500 MG: 500 INJECTION, POWDER, LYOPHILIZED, FOR SOLUTION INTRAVENOUS at 00:49

## 2024-01-17 RX ADMIN — METOCLOPRAMIDE 10 MG: 5 INJECTION, SOLUTION INTRAMUSCULAR; INTRAVENOUS at 01:28

## 2024-01-17 RX ADMIN — CEFAZOLIN SODIUM 2000 MG: 2 SOLUTION INTRAVENOUS at 00:44

## 2024-01-17 RX ADMIN — KETOROLAC TROMETHAMINE 30 MG: 30 INJECTION, SOLUTION INTRAMUSCULAR; INTRAVENOUS at 14:50

## 2024-01-17 RX ADMIN — ACETAMINOPHEN 325MG 650 MG: 325 TABLET ORAL at 02:12

## 2024-01-17 RX ADMIN — LIDOCAINE HYDROCHLORIDE,EPINEPHRINE BITARTRATE 5 ML: 20; .005 INJECTION, SOLUTION EPIDURAL; INFILTRATION; INTRACAUDAL; PERINEURAL at 00:42

## 2024-01-17 RX ADMIN — Medication 62.5 MILLI-UNITS/MIN: at 01:31

## 2024-01-17 RX ADMIN — KETOROLAC TROMETHAMINE 30 MG: 30 INJECTION, SOLUTION INTRAMUSCULAR; INTRAVENOUS at 20:31

## 2024-01-17 RX ADMIN — FENTANYL CITRATE 50 MCG: 50 INJECTION, SOLUTION INTRAMUSCULAR; INTRAVENOUS at 02:12

## 2024-01-17 RX ADMIN — DOCUSATE SODIUM 100 MG: 100 CAPSULE, LIQUID FILLED ORAL at 08:05

## 2024-01-17 RX ADMIN — LIDOCAINE HYDROCHLORIDE,EPINEPHRINE BITARTRATE 5 ML: 20; .005 INJECTION, SOLUTION EPIDURAL; INFILTRATION; INTRACAUDAL; PERINEURAL at 00:47

## 2024-01-17 RX ADMIN — ACETAMINOPHEN 325MG 650 MG: 325 TABLET ORAL at 08:05

## 2024-01-17 RX ADMIN — ACETAMINOPHEN 325MG 650 MG: 325 TABLET ORAL at 20:31

## 2024-01-17 RX ADMIN — KETOROLAC TROMETHAMINE 30 MG: 30 INJECTION, SOLUTION INTRAMUSCULAR; INTRAVENOUS at 01:35

## 2024-01-17 RX ADMIN — ACETAMINOPHEN 325MG 650 MG: 325 TABLET ORAL at 14:50

## 2024-01-17 RX ADMIN — DOCUSATE SODIUM 100 MG: 100 CAPSULE, LIQUID FILLED ORAL at 17:01

## 2024-01-17 RX ADMIN — KETOROLAC TROMETHAMINE 30 MG: 30 INJECTION, SOLUTION INTRAMUSCULAR; INTRAVENOUS at 08:05

## 2024-01-17 RX ADMIN — ONDANSETRON 4 MG: 2 INJECTION INTRAMUSCULAR; INTRAVENOUS at 01:05

## 2024-01-17 NOTE — PLAN OF CARE
Problem: PAIN - ADULT  Goal: Verbalizes/displays adequate comfort level or baseline comfort level  Description: Interventions:  - Encourage patient to monitor pain and request assistance  - Assess pain using appropriate pain scale  - Administer analgesics based on type and severity of pain and evaluate response  - Implement non-pharmacological measures as appropriate and evaluate response  - Consider cultural and social influences on pain and pain management  - Notify physician/advanced practitioner if interventions unsuccessful or patient reports new pain  Outcome: Progressing     Problem: INFECTION - ADULT  Goal: Absence or prevention of progression during hospitalization  Description: INTERVENTIONS:  - Assess and monitor for signs and symptoms of infection  - Monitor lab/diagnostic results  - Monitor all insertion sites, i.e. indwelling lines, tubes, and drains  - Monitor endotracheal if appropriate and nasal secretions for changes in amount and color  - [place] appropriate cooling/warming therapies per order  - Administer medications as ordered  - Instruct and encourage patient and family to use good hand hygiene technique  - Identify and instruct in appropriate isolation precautions for identified infection/condition  Outcome: Progressing  Goal: Absence of fever/infection during neutropenic period  Description: INTERVENTIONS:  - Monitor WBC    Outcome: Progressing     Problem: SAFETY ADULT  Goal: Patient will remain free of falls  Description: INTERVENTIONS:  - Educate patient/family on patient safety including physical limitations  - Instruct patient to call for assistance with activity   - Consult OT/PT to assist with strengthening/mobility   - Keep Call bell within reach  - Keep bed low and locked with side rails adjusted as appropriate  - Keep care items and personal belongings within reach  - Initiate and maintain comfort rounds  - Make Fall Risk Sign visible to staff  - Offer Toileting every  Hours,  in advance of need  - Initiate/Maintain alarm  - Obtain necessary fall risk management equipment:   - Apply yellow socks and bracelet for high fall risk patients  - Consider moving patient to room near nurses station  Outcome: Progressing  Goal: Maintain or return to baseline ADL function  Description: INTERVENTIONS:  -  Assess patient's ability to carry out ADLs; assess patient's baseline for ADL function and identify physical deficits which impact ability to perform ADLs (bathing, care of mouth/teeth, toileting, grooming, dressing, etc.)  - Assess/evaluate cause of self-care deficits   - Assess range of motion  - Assess patient's mobility; develop plan if impaired  - Assess patient's need for assistive devices and provide as appropriate  - Encourage maximum independence but intervene and supervise when necessary  - Involve family in performance of ADLs  - Assess for home care needs following discharge   - Consider OT consult to assist with ADL evaluation and planning for discharge  - Provide patient education as appropriate  Outcome: Progressing  Goal: Maintains/Returns to pre admission functional level  Description: INTERVENTIONS:  - Perform AM-PAC 6 Click Basic Mobility/ Daily Activity assessment daily.  - Set and communicate daily mobility goal to care team and patient/family/caregiver.   - Collaborate with rehabilitation services on mobility goals if consulted  - Perform Range of Motion  times a day.  - Reposition patient every  hours.  - Dangle patient  times a day  - Stand patient  times a day  - Ambulate patient  times a day  - Out of bed to chair  times a day   - Out of bed for meals  times a day  - Out of bed for toileting  - Record patient progress and toleration of activity level   Outcome: Progressing     Problem: Knowledge Deficit  Goal: Patient/family/caregiver demonstrates understanding of disease process, treatment plan, medications, and discharge instructions  Description: Complete learning  assessment and assess knowledge base.  Interventions:  - Provide teaching at level of understanding  - Provide teaching via preferred learning methods  Outcome: Progressing     Problem: DISCHARGE PLANNING  Goal: Discharge to home or other facility with appropriate resources  Description: INTERVENTIONS:  - Identify barriers to discharge w/patient and caregiver  - Arrange for needed discharge resources and transportation as appropriate  - Identify discharge learning needs (meds, wound care, etc.)  - Arrange for interpretive services to assist at discharge as needed  - Refer to Case Management Department for coordinating discharge planning if the patient needs post-hospital services based on physician/advanced practitioner order or complex needs related to functional status, cognitive ability, or social support system  Outcome: Progressing

## 2024-01-17 NOTE — OP NOTE
OPERATIVE REPORT  PATIENT NAME: Maxwell Crabtree    :  1995  MRN: [id number]  Pt Location: [facility] L&D OR ROOM 01    SURGERY DATE: 2024    Surgeons and Role:     * Jacqui Banegas MD - Primary     * Steven Muniz MD - Assisting    Preop Diagnosis:  Pregnancy at 39w  Gestational hypertension  Heterozygous prothrombin [id number] mutation    Procedure(s) (LRB):   SECTION () (N/A)    Specimen(s):  ID Type Source Tests Collected by Time Destination   A :  Cord Blood Cord BLOOD GAS, VENOUS, CORD, BLOOD GAS, ARTERIAL, CORD Jacqui Banegas MD 2024 010    B :  Tissue (Placenta on Hold) OB Only Placenta PLACENTA IN STORAGE Jacqui Banegas MD 2024 010        Surgical QBL:  Surgical QBL (mL): 236 mL      Drains:  Urethral Catheter Non-latex 16 Fr. (Active)   Number of days: 0       Anesthesia Type:   Epidural    Operative Indications:  Persistent category 2 fetal heart rate tracing     Roverto Group Classification System:  No Multiple pregnancy, No Transverse or oblique lie, No Breech lie, Gestational age is > or =37 weeks, Nulliparous, Labor induced +  is ROVERTO GROUP 2a    Complications:   None    Hospital Course:   Maxwell Crabtree is a 28 y.o.  who was admitted at 39w3d for induction of labor secondary to gestational hypertension. Patient was vernell upon admission. Agreed to a casarez balloon. Pitocin was started. Patient received an epidural for pain control. Amniotomy was performed yielding clear fluid.  Fetal heart rate tracing showed a category II tracing. Pitocin was stopped and the patient was bolused. Despite resuscitative measures, the tracing remained category II.  A decision was made to proceed with a primary .       Operative Findings:  1. Viable male  on 2024 at 0102 with APGARs of 8 and 9 at 1 and 5 minutes. Fetus weighted 8lb 8.5oz.  2. Clear amniotic fluid  3. Normal intact placenta with centrally inserted 3VC.  4. Normal uterus, bilateral  tubes and ovaries  5. Adhesions absent    Umbilical Cord Venous Blood Gas:  Results from last 7 days   Lab Units 01/17/24  0102   PH COV  7.289   PCO2 COV mm HG 49.6*   HCO3 COV mmol/L 23.3   BASE EXC COV mmol/L -3.8*   O2 CT CD VB mL/dL 4.6   O2 HGB, VENOUS CORD % 21.1     Umbilical Cord Arterial Blood Gas:  Results from last 7 days   Lab Units 01/17/24  0102   PH COA  7.260   PCO2 COA  49.5   PO2 COA mm HG <10.0   HCO3 COA mmol/L 21.7   BASE EXC COA mmol/L -5.7*   O2 CONTENT CORD ART ml/dl <10.0   O2 HGB, ARTERIAL CORD % 8.4       Operative Technique  The patient was taken to the operating room. Epidural anesthesia was already adequately established and ancef and azithromycin were given for preoperative prophylaxis. The patient was then placed in the dorsal supine position with a left tilt of the hips. The patient was then prepped with chlorhexadine for vaginal and abdominal prep and a casarez catheter was inserted. Patient was then draped in the usual sterile fashion for a Pfannenstiel skin incision.      A time out was performed to confirm correct patient and correct procedure.     A Pfannenstiel incision was made and carried down through the underlying subcutaneous tissue to the fascia using a scalpel. Rectus fascia was then incised. We then proceeded in Sincere-Pizano fashion. All anatomic layers were well-demarcated. The rectus muscles were  and the peritoneum was identified, entered, and extended longitudinally with blunt dissection.     The bladder blade was inserted and a transverse incision was made in the lower uterine segment using a new surgical blade. The uterine incision was extended cephalad and caudal using blunt dissection. The amniotic sac was entered. Clear fluid noted.    The surgeon's hand was placed into the uterine cavity. The fetal head was identified and elevated through the uterine incision with the assistance of fundal pressure. With gentle traction, the shoulder was delivered,  followed by the rest of the fetal body. There was no nuchal cord noted. On delivery the cord was doubly clamped and cut after delayed cord clamping. The infant was then passed off the table to the awaiting  staff. The  was noted to cry spontaneously and moved all extremities. Venous and arterial blood gas, cord blood, and portion of cord was obtained for analysis and routine blood testing. The placenta was then sent to storage. Placenta was noted to be intact with a centrally inserted three-vessel cord.  Oxytocin was administered by IV infusion to enhance uterine contraction. The uterus was exteriorized and cleared of all clots and debris.      The uterine incision was re approximated using an 0 Vicryl in a running locked fashion. A second horizontal imbricating stitch with the same suture was applied. The uterine incision was examined and noted to be hemostatic. The posterior cul-de-sac was cleared of all clots and debris The uterus was replaced into the abdomen and the pericolic gutters were cleared of all clots.  The uterine incision was once again reexamined and noted to be hemostatic. Rectus muscles were re-approximated with 0 chromic suture in a running fashion. The fascia was re-approximated using an 0 Vicryl in a running fashion. The subcutaneous tissue was irrigated and cleared of all clots and debris. Good hemostasis was noted with Bovie electrocautery. The subcutaneous tissue was reapproximated with 0 vicryl. The skin incision was closed using stratafix with steri strips. Good hemostasis was noted. Patient tolerated the procedure well. All needle, sponge, and instrument counts were noted to be correct x 2 at the end of the procedure.  Patient was transferred to the recovery room in stable condition. Dr. Banegas was present and participated in the entire surgery.       Patient Disposition:  PACU         SIGNATURE: Steven Muniz MD  DATE: 2024  TIME: 1:51 AM

## 2024-01-17 NOTE — ANESTHESIA PROCEDURE NOTES
Epidural Block    Patient location during procedure: OB/L&D  Start time: 1/16/2024 10:30 PM  Reason for block: procedure for pain  Staffing  Performed by: Amy Reece DO  Authorized by: Darryl Clarke DO    Preanesthetic Checklist  Completed: patient identified, IV checked, site marked, risks and benefits discussed, surgical consent, monitors and equipment checked, pre-op evaluation and timeout performed  Epidural  Patient position: sitting  Prep: ChloraPrep  Sedation Level: no sedation  Patient monitoring: frequent blood pressure checks, continuous pulse oximetry and heart rate  Approach: midline  Location: lumbar, L3-4  Injection technique: MARCO ANTONIO saline  Needle  Needle type: Tuohy   Needle gauge: 18 G  Needle insertion depth: 6 cm  Catheter type: multi-orifice  Catheter size: 20 G  Catheter at skin depth: 4 cm  Catheter securement method: stabilization device and tape  Test dose: negativelidocaine-epinephrine (XYLOCAINE-MPF/EPINEPHRINE) 1.5 %-1:200,000 injection 3 mL - Epidural   5 mL - 1/16/2024 10:35:00 PM  Assessment  Sensory level: T10  Number of attempts: 1negative aspiration for CSF, negative aspiration for heme and no paresthesia on injection  patient tolerated the procedure well with no immediate complications

## 2024-01-17 NOTE — PLAN OF CARE
Problem: ANTEPARTUM  Goal: Maintain pregnancy as long as maternal and/or fetal condition is stable  Description: INTERVENTIONS:  - Maternal surveillance  - Fetal surveillance  - Monitor uterine activity  - Medications as ordered  - Bedrest  Outcome: Progressing     Problem: BIRTH - VAGINAL/ SECTION  Goal: Fetal and maternal status remain reassuring during the birth process  Description: INTERVENTIONS:  - Monitor vital signs  - Monitor fetal heart rate  - Monitor uterine activity  - Monitor labor progression (vaginal delivery)  - DVT prophylaxis  - Antibiotic prophylaxis  Outcome: Progressing  Goal: Emotionally satisfying birthing experience for mother/fetus  Description: Interventions:  - Assess, plan, implement and evaluate the nursing care given to the patient in labor  - Advocate the philosophy that each childbirth experience is a unique experience and support the family's chosen level of involvement and control during the labor process   - Actively participate in both the patient's and family's teaching of the birth process  - Consider cultural, Jehovah's witness and age-specific factors and plan care for the patient in labor  Outcome: Progressing     Problem: Knowledge Deficit  Goal: Verbalizes understanding of labor plan  Description: Assess patient/family/caregiver's baseline knowledge level and ability to understand information.  Provide education via patient/family/caregiver's preferred learning method at appropriate level of understanding.     1. Provide teaching at level of understanding.  2. Provide teaching via preferred learning method(s).  Outcome: Progressing     Problem: Labor & Delivery  Goal: Manages discomfort  Description: Assess and monitor for signs and symptoms of discomfort.  Assess patient's pain level regularly and per hospital policy.  Administer medications as ordered. Support use of nonpharmacological methods to help control pain such as distraction, imagery, relaxation, and  application of heat and cold.  Collaborate with interdisciplinary team and patient to determine appropriate pain management plan.    1. Include patient in decisions related to comfort.  2. Offer non-pharmacological pain management interventions.  3. Report ineffective pain management to physician.  Outcome: Progressing  Goal: Patient vital signs are stable  Description: 1. Assess vital signs - vaginal delivery.  Outcome: Progressing

## 2024-01-17 NOTE — OB LABOR/OXYTOCIN SAFETY PROGRESS
Oxytocin Safety Progress Check Note - Maxwell Crabtree 28 y.o. female MRN: [id number]    Unit/Bed#: L&D 323-01 Encounter: [id number]    Dose (tray-units/min) Oxytocin: 6 tray-units/min  Contraction Frequency (minutes): 1.5-3.5  Contraction Intensity: Moderate  Uterine Activity Characteristics: Irritability  Cervical Dilation: 5        Cervical Effacement: 80  Fetal Station: -3  Baseline Rate (FHR): 150 bpm  Fetal Heart Rate (FHT): 140 BPM  FHR Category: 2               Vital Signs:   Vitals:    01/16/24 2200   BP: 121/65   Pulse: 92   Resp:    Temp: 98 °F (36.7 °C)       Notes/comments:   Patient still uncomfortable even with epidural bolus. FHT showing late decelerations despite resuscitation efforts. SVE unchanged. Will hold pitocin. Anesthesia will replace epidural as well.  Dr. Makenzie Muniz MD 1/16/2024 10:26 PM

## 2024-01-17 NOTE — ANESTHESIA PREPROCEDURE EVALUATION
Procedure:  LABOR ANALGESIA    Relevant Problems   CARDIO   (+) Gestational hypertension   (+) Hypertension affecting pregnancy      GYN   (+) 39 weeks gestation of pregnancy      Hematopoietic and Hemostatic   (+) Thrombophilia complicating pregnancy in third trimester, antepartum (HCC)      Other   (+) Prothrombin gene mutation (HCC)   (+) Third trimester pregnancy        Physical Exam    Airway    Mallampati score: I  TM Distance: >3 FB  Neck ROM: full     Dental       Cardiovascular  Rhythm: regular, Rate: normal, Cardiovascular exam normal    Pulmonary  Pulmonary exam normal Breath sounds clear to auscultation    Other Findings  post-pubertal.      Anesthesia Plan  ASA Score- 2     Anesthesia Type- epidural with ASA Monitors.         Additional Monitors:     Airway Plan:            Plan Factors-Exercise tolerance (METS): >4 METS.    Chart reviewed.        Patient is not a current smoker.              Induction-     Postoperative Plan-     Informed Consent- Anesthetic plan and risks discussed with patient and spouse.

## 2024-01-17 NOTE — OB LABOR/OXYTOCIN SAFETY PROGRESS
Oxytocin Safety Progress Check Note - Maxwell Crabtree 28 y.o. female MRN: [id number]    Unit/Bed#: L&D 323-01 Encounter: [id number]    Dose (tray-units/min) Oxytocin: 0 tray-units/min  Contraction Frequency (minutes): 1-3  Contraction Intensity: Moderate  Uterine Activity Characteristics: Irritability  Cervical Dilation: 5        Cervical Effacement: 80  Fetal Station: -3  Baseline Rate (FHR): 165 bpm  Fetal Heart Rate (FHT): 140 BPM  FHR Category: 2               Vital Signs:   Vitals:    24 2248   BP: 121/62   Pulse: 82   Resp:    Temp:        Notes/comments:   FHT still category 2 despite resuscitation efforts. Periods of minimal variability and late decelerations. Discussed with patient recommendation of moving forward with  delivery. She is agreeable.  Dr. Makenzie Muniz MD 2024 12:18 AM

## 2024-01-17 NOTE — ANESTHESIA POSTPROCEDURE EVALUATION
"Post-Op Assessment Note    CV Status:  Stable  Pain Score: 0    Pain management: adequate      Post-op block assessment: site cleaned, no complications and catheter intact   Mental Status:  Alert and awake   Hydration Status:  Euvolemic and stable   PONV Controlled:  Controlled   Airway Patency:  Patent     Post Op Vitals Reviewed: Yes      Staff: Anesthesiologist               BP      Temp      Pulse     Resp      SpO2      /84 (BP Location: Right arm)   Pulse 76   Temp 98.7 °F (37.1 °C) (Oral)   Resp 16   Ht 5' 6\" (1.676 m)   Wt 97.5 kg (215 lb)   LMP 04/16/2023 (Exact Date)   SpO2 98%   Breastfeeding Yes   BMI 34.70 kg/m²     "

## 2024-01-17 NOTE — OB LABOR/OXYTOCIN SAFETY PROGRESS
Oxytocin Safety Progress Check Note - Maxwell Crabtree 28 y.o. female MRN: [id number]    Unit/Bed#: L&D 323-01 Encounter: [id number]    Dose (tray-units/min) Oxytocin: 6 tray-units/min  Contraction Frequency (minutes): 4-5  Contraction Intensity: Moderate  Uterine Activity Characteristics: Irritability  Cervical Dilation: 5        Cervical Effacement: 80  Fetal Station: -3  Baseline Rate (FHR): 140 bpm  Fetal Heart Rate (FHT): 140 BPM  FHR Category: 1               Vital Signs:   Vitals:    01/16/24 2015   BP: 138/78   Pulse: 86   Resp:    Temp:        Notes/comments:   Patient vomiting and feeling nauseous. She has received zofran. Will give reglan as well. She has an epidural in place now. Still feeling contractions on her right at the moment. SVE as above. Continue pitocin titration     Steven Muniz MD 1/16/2024 9:12 PM

## 2024-01-17 NOTE — ANESTHESIA PROCEDURE NOTES
Epidural Block    Patient location during procedure: OB/L&D  Start time: 1/16/2024 8:05 PM  Reason for block: procedure for pain, at surgeon's request and primary anesthetic  Staffing  Performed by: Darryl Clarke DO  Authorized by: Darryl Clarke DO    Preanesthetic Checklist  Completed: patient identified, IV checked, site marked, risks and benefits discussed, surgical consent, monitors and equipment checked, pre-op evaluation and timeout performed  Epidural  Patient position: sitting  Prep: Betadine  Sedation Level: no sedation  Patient monitoring: frequent blood pressure checks  Approach: midline  Location: lumbar, L2-3  Injection technique: MARCO ANTONIO air  Needle  Needle type: Tuohy   Needle gauge: 18 G  Needle insertion depth: 7 cm  Catheter at skin depth: 10 cm  Catheter securement method: clear occlusive dressing  Test dose: negative  Assessment  Number of attempts: 1negative aspiration for CSF, negative aspiration for heme and no paresthesia on injection  patient tolerated the procedure well with no immediate complications

## 2024-01-17 NOTE — PLAN OF CARE
Problem: PAIN - ADULT  Goal: Verbalizes/displays adequate comfort level or baseline comfort level  Description: Interventions:  - Encourage patient to monitor pain and request assistance  - Assess pain using appropriate pain scale  - Administer analgesics based on type and severity of pain and evaluate response  - Implement non-pharmacological measures as appropriate and evaluate response  - Consider cultural and social influences on pain and pain management  - Notify physician/advanced practitioner if interventions unsuccessful or patient reports new pain  Outcome: Progressing     Problem: INFECTION - ADULT  Goal: Absence or prevention of progression during hospitalization  Description: INTERVENTIONS:  - Assess and monitor for signs and symptoms of infection  - Monitor lab/diagnostic results  - Monitor all insertion sites, i.e. indwelling lines, tubes, and drains  - Monitor endotracheal if appropriate and nasal secretions for changes in amount and color  - [place] appropriate cooling/warming therapies per order  - Administer medications as ordered  - Instruct and encourage patient and family to use good hand hygiene technique  - Identify and instruct in appropriate isolation precautions for identified infection/condition  Outcome: Progressing  Goal: Absence of fever/infection during neutropenic period  Description: INTERVENTIONS:  - Monitor WBC    Outcome: Progressing     Problem: SAFETY ADULT  Goal: Patient will remain free of falls  Description: INTERVENTIONS:  - Educate patient/family on patient safety including physical limitations  - Instruct patient to call for assistance with activity   - Consult OT/PT to assist with strengthening/mobility   - Keep Call bell within reach  - Keep bed low and locked with side rails adjusted as appropriate  - Keep care items and personal belongings within reach  - Initiate and maintain comfort rounds  - Make Fall Risk Sign visible to staff  - Offer Toileting every  Hours,  in advance of need  - Initiate/Maintain alarm  - Obtain necessary fall risk management equipment:   - Apply yellow socks and bracelet for high fall risk patients  - Consider moving patient to room near nurses station  Outcome: Progressing  Goal: Maintain or return to baseline ADL function  Description: INTERVENTIONS:  -  Assess patient's ability to carry out ADLs; assess patient's baseline for ADL function and identify physical deficits which impact ability to perform ADLs (bathing, care of mouth/teeth, toileting, grooming, dressing, etc.)  - Assess/evaluate cause of self-care deficits   - Assess range of motion  - Assess patient's mobility; develop plan if impaired  - Assess patient's need for assistive devices and provide as appropriate  - Encourage maximum independence but intervene and supervise when necessary  - Involve family in performance of ADLs  - Assess for home care needs following discharge   - Consider OT consult to assist with ADL evaluation and planning for discharge  - Provide patient education as appropriate  Outcome: Progressing  Goal: Maintains/Returns to pre admission functional level  Description: INTERVENTIONS:  - Perform AM-PAC 6 Click Basic Mobility/ Daily Activity assessment daily.  - Set and communicate daily mobility goal to care team and patient/family/caregiver.   - Collaborate with rehabilitation services on mobility goals if consulted  - Perform Range of Motion  times a day.  - Reposition patient every  hours.  - Dangle patient  times a day  - Stand patient  times a day  - Ambulate patient  times a day  - Out of bed to chair  times a day   - Out of bed for meals  times a day  - Out of bed for toileting  - Record patient progress and toleration of activity level   Outcome: Progressing     Problem: Knowledge Deficit  Goal: Patient/family/caregiver demonstrates understanding of disease process, treatment plan, medications, and discharge instructions  Description: Complete learning  assessment and assess knowledge base.  Interventions:  - Provide teaching at level of understanding  - Provide teaching via preferred learning methods  Outcome: Progressing     Problem: DISCHARGE PLANNING  Goal: Discharge to home or other facility with appropriate resources  Description: INTERVENTIONS:  - Identify barriers to discharge w/patient and caregiver  - Arrange for needed discharge resources and transportation as appropriate  - Identify discharge learning needs (meds, wound care, etc.)  - Arrange for interpretive services to assist at discharge as needed  - Refer to Case Management Department for coordinating discharge planning if the patient needs post-hospital services based on physician/advanced practitioner order or complex needs related to functional status, cognitive ability, or social support system  Outcome: Progressing

## 2024-01-17 NOTE — LACTATION NOTE
This note was copied from a baby's chart.  CONSULT - LACTATION  Baby Boy (Susanna Crabtree 0 days male MRN: [id number]    [facility] NURSERY Room / Bed: L&D 309(N)/L&D 309(N) Encounter: [id number]    Maternal Information     MOTHER:  Maxwell Crabtree  Maternal Age: 28 y.o.   OB History: # 1 - Date: None, Sex: None, Weight: None, GA: None, Delivery: None, Apgar1: None, Apgar5: None, Living: None, Birth Comments: None    # 2 - Date: 24, Sex: Male, Weight: 3870 g (8 lb 8.5 oz), GA: 39w3d, Delivery: , Low Transverse, Apgar1: 8, Apgar5: 9, Living: Living, Birth Comments: None   Previouse breast reduction surgery? No    Lactation history:   Has patient previously breast fed: No   How long had patient previously breast fed:     Previous breast feeding complications:       Past Surgical History:   Procedure Laterality Date    WISDOM TOOTH EXTRACTION      WISDOM TOOTH EXTRACTION          Birth information:  YOB: 2023   Time of birth: 1:02 AM   Sex: male   Delivery type: , Low Transverse   Birth Weight: 3870 g (8 lb 8.5 oz)   Percent of Weight Change: 0%     Gestational Age: 39w3d   [unfilled]    Assessment     Breast and nipple assessment:  left breast is larger than right breast. Bilateral fibroadenoma.     Assessment: normal assessment    Feeding assessment: feeding well  LATCH:  Latch: Grasps breast, tongue down, lips flanged, rhythmic sucking   Audible Swallowing: Spontaneous and intermittent (24 hours old)   Type of Nipple: Everted (After stimulation)   Comfort (Breast/Nipple): Soft/non-tender   Hold (Positioning): Partial assist, teach one side, mother does other, staff holds   LATCH Score: 9           24 0845   Lactation Consultation   Reason for Consult 20;20 min   Maternal Information   Has mother  before? No   Infant to breast within first hour of birth? No   Breastfeeding Delayed Due to Maternal status   LATCH  Documentation   Latch 2   Audible Swallowing 2   Type of Nipple 2   Comfort (Breast/Nipple) 2   Hold (Positioning) 1   LATCH Score 9   Having latch problems? No   Position(s) Used Football;Cross Cradle   Breasts/Nipples   Left Breast Soft  (fibroadenoma, left breast larger than right)   Right Breast Soft  (fibroadenoma, right smaller than left)   Left Nipple Everted   Right Nipple Everted   Intervention Hand expression   Breastfeeding Progress Not yet established   Breast Pump   Pump 3  (baby Budha)   Pump Review/Education Setup, frequency, and cleaning;Milk storage   Patient Follow-Up   Lactation Consult Status 2   Follow-Up Type Inpatient;Call as needed   Other OB Lactation Documentation    Additional Problem Noted Demonstration with teach back of hand expression, effective ffor drops. Assisted mom in latching baby to right breast in football hold. Baby actively sucking with swallows. Brought S2S, transitioned to left breast. Helped position mom and baby in cross cradle on left breast. Baby actively sucking with swallows. Reviewed hunger/fullness cues with mom. Encouraged to call for further assistance  (RSB booklet reviewed, D/C Booklet at bedside)     Feeding recommendations:  breast feed on demand    Demonstration with teach back of hand expression, effective ffor drops. Assisted mom in latching baby to right breast in football hold. Baby actively sucking with swallows. Demonstration with teach back effective. Brought S2S, transitioned to left breast. Helped position mom and baby in cross cradle on left breast. Baby actively sucking with swallows. Reviewed hunger/fullness cues with mom. Encouraged mom to make an appointment at the [facility] support center. Number provided and encouraged to call for further assistance if needed.     Information on hand expression given. Discussed benefits of knowing how to manually express breast including stimulating milk supply, softening nipple for latch and evacuating  breast in the event of engorgement.    Provided demonstration, education and support of deep latch to breast by placing the nipple to the nose, dragging down to chin to achieve a wide latch. Bring baby to the breast, not breast to baby. Move your shoulders down and away from your ears. Look for ear, shoulder, hip alignment. Baby's upper and lower lip should be flanged on the breast.    Met with mother. Provided mother with Ready, Set, Baby booklet which contained information on:  Hand expression with access to QR codes to review hand expression.  Positioning and latch reviewed as well as showing images of other feeding positions.  Discussed the properties of a good latch in any position.   Feeding on cue and what that means for recognizing infant's hunger, s/s that baby is getting enough milk and some s/s that breastfeeding dyad may need further help  Skin to Skin contact an benefits to mom and baby  Avoidance of pacifiers for the first month discussed.   Gave information on common concerns, what to expect the first few weeks after delivery, preparing for other caregivers, and how partners can help. Resources for support also provided.      Suzie Duarte 1/17/2024 9:36 AM

## 2024-01-17 NOTE — PROGRESS NOTES
Post- op check      - doing well, A&O x3, tolerating PO liquids. Denies pain. Rodriguez draining clear yellow urine. Incison C/D and covered with steri-strips. Small amount of dried blood noted. No active bleeding.  SCDs in on. Minimal vaginal bleeding. Breast & formula feeding

## 2024-01-17 NOTE — DISCHARGE SUMMARY
OB Discharge Summary  Maxwell Crabtree 28 y.o. female MRN: [id number]    Unit/Bed#: L&D 309-01 Encounter: [id number]    Admission Date: 2024   Discharge Date: 2024    Attending Physician:  Dr Banegas    Admitting Diagnosis:  Gestational hypertension  Persistent category II tracing    Discharge Diagnosis:  same    Procedures: primary  section    Complications:   none    Hospital Course:   Maxwell Crabtree is a 28 y.o.  who was admitted at 39w3d wks for induction of labor secondary to gestational hypertension. Patient was vernell upon admission. Agreed to a casarez balloon. Pitocin was started. Patient received an epidural for pain control. Amniotomy was performed yielding clear fluid.  Fetal heart rate tracing showed a category II tracing. Pitocin was stopped and the patient was bolused. Despite resuscitative measures, the tracing remained category II.  A decision was made to proceed with a primary .     She delivered a viable male infant weighing 8lbs 9oz  with Apgars  8/9 via primary .    The patient's post partum course was unremarkable.    On day of discharge she was ambulating, voiding spontaneously, tolerating oral intake and hemodynamically stable.  She is breast feeding .  Mom's blood type is O positive  while baby's is O positive  .  Rhogam was not given.    Condition at discharge: good     Discharge Medications: Prenatal vitamin daily for 6 months or the duration of nursing whichever is longer.  Motrin 600 mg orally every 6 hours as needed for pain  Tylenol (over the counter) per bottle directions as needed for pain  Hydrocortisone cream 1% (over the counter) applied 1-2x daily to hemorrhoids as needed  Witch hazel pads for hemorrhoidal discomfort as needed    Discharge instructions :   -Do not place anything (no partner, tampons or douche) in your vagina for 6 weeks.  -You may walk for exercise for the first 6 weeks then gradually return to your usual activities.    -Please do not drive for 1 week if you have no stitches and for 2 weeks if you have stitches or underwent a  delivery.    -You may take baths or shower per your preference.   -Please look at your bust (breasts) in the mirror daily and call for redness or tenderness or increased warmth.   - If you have had a  please look at your incision daily as well and call us for increasing redness or steady drainage from the incision.   -Please call us for temperature > 100.4*F or 38* C, worsening pain or a foul discharge.    Disposition: See After Visit Summary for discharge disposition information.    Provisions for Follow-Up Care: Follow up in office in 6 weeks. Please call  to schedule.  Call sooner with questions or concerns.    Planned Readmission: No

## 2024-01-18 PROCEDURE — 99024 POSTOP FOLLOW-UP VISIT: CPT | Performed by: OBSTETRICS & GYNECOLOGY

## 2024-01-18 RX ORDER — ACETAMINOPHEN 325 MG/1
650 TABLET ORAL EVERY 6 HOURS
Status: DISCONTINUED | OUTPATIENT
Start: 2024-01-18 | End: 2024-01-19 | Stop reason: HOSPADM

## 2024-01-18 RX ADMIN — SIMETHICONE 80 MG: 80 TABLET, CHEWABLE ORAL at 04:47

## 2024-01-18 RX ADMIN — IBUPROFEN 600 MG: 600 TABLET, FILM COATED ORAL at 20:19

## 2024-01-18 RX ADMIN — ACETAMINOPHEN 325MG 650 MG: 325 TABLET ORAL at 14:19

## 2024-01-18 RX ADMIN — ENOXAPARIN SODIUM 40 MG: 40 INJECTION SUBCUTANEOUS at 06:17

## 2024-01-18 RX ADMIN — ACETAMINOPHEN 325MG 650 MG: 325 TABLET ORAL at 02:10

## 2024-01-18 RX ADMIN — DOCUSATE SODIUM 100 MG: 100 CAPSULE, LIQUID FILLED ORAL at 18:27

## 2024-01-18 RX ADMIN — ACETAMINOPHEN 325MG 650 MG: 325 TABLET ORAL at 20:19

## 2024-01-18 RX ADMIN — KETOROLAC TROMETHAMINE 30 MG: 30 INJECTION, SOLUTION INTRAMUSCULAR; INTRAVENOUS at 02:10

## 2024-01-18 RX ADMIN — IBUPROFEN 600 MG: 600 TABLET, FILM COATED ORAL at 14:19

## 2024-01-18 RX ADMIN — ACETAMINOPHEN 325MG 650 MG: 325 TABLET ORAL at 07:49

## 2024-01-18 RX ADMIN — DOCUSATE SODIUM 100 MG: 100 CAPSULE, LIQUID FILLED ORAL at 07:53

## 2024-01-18 RX ADMIN — OXYCODONE 5 MG: 5 TABLET ORAL at 19:13

## 2024-01-18 RX ADMIN — KETOROLAC TROMETHAMINE 30 MG: 30 INJECTION, SOLUTION INTRAMUSCULAR; INTRAVENOUS at 07:49

## 2024-01-18 RX ADMIN — OXYCODONE 5 MG: 5 TABLET ORAL at 23:09

## 2024-01-18 NOTE — PLAN OF CARE
Problem: PAIN - ADULT  Goal: Verbalizes/displays adequate comfort level or baseline comfort level  Description: Interventions:  - Encourage patient to monitor pain and request assistance  - Assess pain using appropriate pain scale  - Administer analgesics based on type and severity of pain and evaluate response  - Implement non-pharmacological measures as appropriate and evaluate response  - Consider cultural and social influences on pain and pain management  - Notify physician/advanced practitioner if interventions unsuccessful or patient reports new pain  Outcome: Progressing     Problem: INFECTION - ADULT  Goal: Absence or prevention of progression during hospitalization  Description: INTERVENTIONS:  - Assess and monitor for signs and symptoms of infection  - Monitor lab/diagnostic results  - Monitor all insertion sites, i.e. indwelling lines, tubes, and drains  - Monitor endotracheal if appropriate and nasal secretions for changes in amount and color  - [place] appropriate cooling/warming therapies per order  - Administer medications as ordered  - Instruct and encourage patient and family to use good hand hygiene technique  - Identify and instruct in appropriate isolation precautions for identified infection/condition  Outcome: Progressing  Goal: Absence of fever/infection during neutropenic period  Description: INTERVENTIONS:  - Monitor WBC    Outcome: Progressing     Problem: SAFETY ADULT  Goal: Patient will remain free of falls  Description: INTERVENTIONS:  - Educate patient/family on patient safety including physical limitations  - Instruct patient to call for assistance with activity   - Consult OT/PT to assist with strengthening/mobility   - Keep Call bell within reach  - Keep bed low and locked with side rails adjusted as appropriate  - Keep care items and personal belongings within reach  - Initiate and maintain comfort rounds  - Make Fall Risk Sign visible to staff    Outcome: Progressing  Goal:  Maintain or return to baseline ADL function  Description: INTERVENTIONS:  -  Assess patient's ability to carry out ADLs; assess patient's baseline for ADL function and identify physical deficits which impact ability to perform ADLs (bathing, care of mouth/teeth, toileting, grooming, dressing, etc.)  - Assess/evaluate cause of self-care deficits   - Assess range of motion  - Assess patient's mobility; develop plan if impaired  - Assess patient's need for assistive devices and provide as appropriate  - Encourage maximum independence but intervene and supervise when necessary  - Involve family in performance of ADLs  - Assess for home care needs following discharge   - Consider OT consult to assist with ADL evaluation and planning for discharge  - Provide patient education as appropriate  Outcome: Progressing  Goal: Maintains/Returns to pre admission functional level  Description: INTERVENTIONS:  - Perform AM-PAC 6 Click Basic Mobility/ Daily Activity assessment daily.  - Set and communicate daily mobility goal to care team and patient/family/caregiver.   - Collaborate with rehabilitation services on mobility goals if consulted  - Record patient progress and toleration of activity level   Outcome: Progressing     Problem: Knowledge Deficit  Goal: Patient/family/caregiver demonstrates understanding of disease process, treatment plan, medications, and discharge instructions  Description: Complete learning assessment and assess knowledge base.  Interventions:  - Provide teaching at level of understanding  - Provide teaching via preferred learning methods  Outcome: Progressing     Problem: DISCHARGE PLANNING  Goal: Discharge to home or other facility with appropriate resources  Description: INTERVENTIONS:  - Identify barriers to discharge w/patient and caregiver  - Arrange for needed discharge resources and transportation as appropriate  - Identify discharge learning needs (meds, wound care, etc.)  - Arrange for  interpretive services to assist at discharge as needed  - Refer to Case Management Department for coordinating discharge planning if the patient needs post-hospital services based on physician/advanced practitioner order or complex needs related to functional status, cognitive ability, or social support system  Outcome: Progressing

## 2024-01-18 NOTE — PROGRESS NOTES
Obstetrics Progress Note  Maxwell Crabtree 28 y.o. female MRN: [id number]  Unit/Bed#: L&D 309-01 Encounter: [id number]    Assessment/Plan:    Postpartum Day #1 s/p 1LTCS (cat II tracing), currently stable. Baby in room. By issue:  * Status post primary low transverse  section  Assessment & Plan       Continue routine post partum care       [id number], HgB 13.3 -> 12.4  Pain well controlled: tylenol/motrin scheduled, dave prn  Lochia within normal limits: continue to monitor   OOB: as able, encourage ambulation  Passing flatus  S/p void trial, voiding spontaneously  DVT ppx: SCD, Lovenox 40mg  Breastfeeding  Baby in: room  Dispo: anticipate d/c home POD2-4      Gestational hypertension  Assessment & Plan  Admission CBC, CMP wnl; urine PC 0.11  Currently asymptomatic  Systolic (12hrs), Av , Min:117 , Max:138   Diastolic (12hrs), Av, Min:69, Max:85  Continue to monitor BP    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 Pain: controlled. Tolerating PO: yes. Voiding: spontaneously. Flatus: passing. Ambulating: yes. Chest pain: no. Shortness of breath: no. Leg pain: no. Lochia: within normal limits. Baby in room, feeding via breast.    Objective:   Vitals:   Temp:  [97.4 °F (36.3 °C)-99.4 °F (37.4 °C)] 97.4 °F (36.3 °C)  HR:  [71-81] 71  Resp:  [16-18] 18  BP: (115-138)/(64-85) 117/69       Intake/Output Summary (Last 24 hours) at 2024 0607  Last data filed at 2024 0401  Gross per 24 hour   Intake --   Output 3300 ml   Net -3300 ml       Lab Results   Component Value Date    WBC 12.04 (H) 2024    HGB 12.4 2024    HCT 36.5 2024    MCV 89 2024     2024       Physical Exam:   General: alert and oriented x3, in no apparent distress  Cardiovascular: regular rate  Pulmonary: normal effort  Abdomen: Soft, non-tender, non-distended, no  rebound or guarding. Uterine fundus firm and non-tender, at the umbilicus  Incision: clean/dry/intact  Extremities: Non tender with no edema      Johana Ayon MD  PGY-I, OBGYN  1/18/2024, 6:07 AM

## 2024-01-18 NOTE — LACTATION NOTE
This note was copied from a baby's chart.     01/18/24 1150   Lactation Consultation   Reason for Consult 10 m;20 min   LATCH Documentation   Latch 2   Audible Swallowing 2   Type of Nipple 2   Comfort (Breast/Nipple) 2   Hold (Positioning) 2   LATCH Score 10   Having latch problems? No   Position(s) Used Football   Breasts/Nipples   Left Breast Soft   Right Breast Soft   Left Nipple Everted   Right Nipple Everted   Intervention Hand expression   Breastfeeding Progress Not yet established   Breast Pump   Pump 3  (Baby Budha Pump)   Patient Follow-Up   Lactation Consult Status 2   Follow-Up Type Inpatient;Call as needed   Other OB Lactation Documentation    Additional Problem Noted Mom called out for assistance in latching baby. Mom positioned herself in football hold on right breast. Baby deeply latched, actively sucking with swallows.     Mom called out for assistance in latching baby. Mom positioned herself in football hold on right breast. Baby deeply latched. Actively sucking with swallows. Reviewed hunger/fullness cues. Encouraged to call for further assistance.     Provided demonstration, education and support of deep latch to breast by placing the nipple to the nose, dragging down to chin to achieve a wide latch. Bring baby to the breast, not breast to baby. Move your shoulders down and away from your ears. Look for ear, shoulder, hip alignment. Baby's upper and lower lip should be flanged on the breast.    Encouraged parents to call for assistance, questions, and concerns about breastfeeding.  Extension provided.

## 2024-01-18 NOTE — ASSESSMENT & PLAN NOTE
Continue routine post partum care       [id number], HgB 13.3 -> 12.4  Pain well controlled: tylenol/motrin scheduled, dave prn  Lochia within normal limits: continue to monitor   OOB: as able, encourage ambulation  Passing flatus  S/p void trial, voiding spontaneously  DVT ppx: SCD, Lovenox 40mg  Breastfeeding  Baby in: room  Dispo: anticipate d/c home POD2-4

## 2024-01-18 NOTE — LACTATION NOTE
This note was copied from a baby's chart.     01/18/24 1000   Lactation Consultation   Reason for Consult 10 m   Breasts/Nipples   Breastfeeding Progress Not yet established   Patient Follow-Up   Lactation Consult Status 2   Follow-Up Type Inpatient;Call as needed   Other OB Lactation Documentation    Additional Problem Noted Mom states she is working on breastfeeding, feels it is more positional and how she is holding baby. Encouraged to call for next latch assessment.       Mom states she is working on breastfeeding and getting a deep latch. Feels it is more positional in how she is holding baby. Once baby is latched she feels it is deep. Education on proper position and alignment for a deep latch. Reviewed different positions with mom. Mom would like us to come back to show the different positions. Encouraged to call for next feeding session.

## 2024-01-18 NOTE — PLAN OF CARE
Problem: PAIN - ADULT  Goal: Verbalizes/displays adequate comfort level or baseline comfort level  Description: Interventions:  - Encourage patient to monitor pain and request assistance  - Assess pain using appropriate pain scale  - Administer analgesics based on type and severity of pain and evaluate response  - Implement non-pharmacological measures as appropriate and evaluate response  - Consider cultural and social influences on pain and pain management  - Notify physician/advanced practitioner if interventions unsuccessful or patient reports new pain  Outcome: Progressing     Problem: INFECTION - ADULT  Goal: Absence or prevention of progression during hospitalization  Description: INTERVENTIONS:  - Assess and monitor for signs and symptoms of infection  - Monitor lab/diagnostic results  - Monitor all insertion sites, i.e. indwelling lines, tubes, and drains  - Monitor endotracheal if appropriate and nasal secretions for changes in amount and color  - [place] appropriate cooling/warming therapies per order  - Administer medications as ordered  - Instruct and encourage patient and family to use good hand hygiene technique  - Identify and instruct in appropriate isolation precautions for identified infection/condition  Outcome: Progressing  Goal: Absence of fever/infection during neutropenic period  Description: INTERVENTIONS:  - Monitor WBC    Outcome: Progressing     Problem: SAFETY ADULT  Goal: Patient will remain free of falls  Description: INTERVENTIONS:  - Educate patient/family on patient safety including physical limitations  - Instruct patient to call for assistance with activity   - Consult OT/PT to assist with strengthening/mobility   - Keep Call bell within reach  - Keep bed low and locked with side rails adjusted as appropriate  - Keep care items and personal belongings within reach  - Initiate and maintain comfort rounds  Outcome: Progressing  Goal: Maintain or return to baseline ADL  function  Description: INTERVENTIONS:  -  Assess patient's ability to carry out ADLs; assess patient's baseline for ADL function and identify physical deficits which impact ability to perform ADLs (bathing, care of mouth/teeth, toileting, grooming, dressing, etc.)  - Assess/evaluate cause of self-care deficits   - Assess range of motion  - Assess patient's mobility; develop plan if impaired  - Assess patient's need for assistive devices and provide as appropriate  - Encourage maximum independence but intervene and supervise when necessary  - Involve family in performance of ADLs  - Assess for home care needs following discharge   - Consider OT consult to assist with ADL evaluation and planning for discharge  - Provide patient education as appropriate  Outcome: Progressing  Goal: Maintains/Returns to pre admission functional level  Description: INTERVENTIONS:  - Perform AM-PAC 6 Click Basic Mobility/ Daily Activity assessment daily.  - Set and communicate daily mobility goal to care team and patient/family/caregiver.   - Collaborate with rehabilitation services on mobility goals if consulted  - Record patient progress and toleration of activity level   Outcome: Progressing     Problem: Knowledge Deficit  Goal: Patient/family/caregiver demonstrates understanding of disease process, treatment plan, medications, and discharge instructions  Description: Complete learning assessment and assess knowledge base.  Interventions:  - Provide teaching at level of understanding  - Provide teaching via preferred learning methods  Outcome: Progressing     Problem: DISCHARGE PLANNING  Goal: Discharge to home or other facility with appropriate resources  Description: INTERVENTIONS:  - Identify barriers to discharge w/patient and caregiver  - Arrange for needed discharge resources and transportation as appropriate  - Identify discharge learning needs (meds, wound care, etc.)  - Arrange for interpretive services to assist at discharge  as needed  - Refer to Case Management Department for coordinating discharge planning if the patient needs post-hospital services based on physician/advanced practitioner order or complex needs related to functional status, cognitive ability, or social support system  Outcome: Progressing

## 2024-01-19 VITALS
DIASTOLIC BLOOD PRESSURE: 77 MMHG | BODY MASS INDEX: 34.55 KG/M2 | SYSTOLIC BLOOD PRESSURE: 128 MMHG | TEMPERATURE: 98 F | HEART RATE: 84 BPM | RESPIRATION RATE: 16 BRPM | WEIGHT: 215 LBS | OXYGEN SATURATION: 98 % | HEIGHT: 66 IN

## 2024-01-19 PROCEDURE — NC001 PR NO CHARGE: Performed by: OBSTETRICS & GYNECOLOGY

## 2024-01-19 PROCEDURE — 99024 POSTOP FOLLOW-UP VISIT: CPT | Performed by: OBSTETRICS & GYNECOLOGY

## 2024-01-19 RX ORDER — ACETAMINOPHEN 325 MG/1
650 TABLET ORAL EVERY 6 HOURS
Status: CANCELLED
Start: 2024-01-19 | End: 2024-01-20

## 2024-01-19 RX ORDER — ENOXAPARIN SODIUM 100 MG/ML
40 INJECTION SUBCUTANEOUS
Qty: 16 ML | Refills: 0 | Status: SHIPPED | OUTPATIENT
Start: 2024-01-20 | End: 2024-02-29

## 2024-01-19 RX ORDER — IBUPROFEN 600 MG/1
600 TABLET ORAL EVERY 6 HOURS
Qty: 50 TABLET | Refills: 0 | Status: SHIPPED | OUTPATIENT
Start: 2024-01-19

## 2024-01-19 RX ORDER — OXYCODONE HYDROCHLORIDE 5 MG/1
5 TABLET ORAL EVERY 4 HOURS PRN
Qty: 10 TABLET | Refills: 0 | Status: SHIPPED | OUTPATIENT
Start: 2024-01-19

## 2024-01-19 RX ORDER — SENNOSIDES 8.6 MG
650 CAPSULE ORAL EVERY 8 HOURS PRN
Qty: 30 TABLET | Refills: 0 | Status: SHIPPED | OUTPATIENT
Start: 2024-01-19

## 2024-01-19 RX ADMIN — DOCUSATE SODIUM 100 MG: 100 CAPSULE, LIQUID FILLED ORAL at 08:22

## 2024-01-19 RX ADMIN — OXYCODONE 5 MG: 5 TABLET ORAL at 05:23

## 2024-01-19 RX ADMIN — IBUPROFEN 600 MG: 600 TABLET, FILM COATED ORAL at 08:22

## 2024-01-19 RX ADMIN — ACETAMINOPHEN 325MG 650 MG: 325 TABLET ORAL at 11:16

## 2024-01-19 RX ADMIN — IBUPROFEN 600 MG: 600 TABLET, FILM COATED ORAL at 02:22

## 2024-01-19 RX ADMIN — ACETAMINOPHEN 325MG 650 MG: 325 TABLET ORAL at 02:22

## 2024-01-19 RX ADMIN — ENOXAPARIN SODIUM 40 MG: 40 INJECTION SUBCUTANEOUS at 05:23

## 2024-01-19 RX ADMIN — OXYCODONE 5 MG: 5 TABLET ORAL at 12:33

## 2024-01-19 NOTE — LACTATION NOTE
This note was copied from a baby's chart.  Initially latch is shallow and painful for mom. Worked on positioning infant up at chest level and starting to feed infant with nose arriving at the nipple. Then, using areolar compression to achieve a deep latch that is comfortable and exchanges optimum amounts of milk. I offered suggestions on positioning, for a more optimal latch, showed mom proper positioning, ear, shoulder hip in line, baby's arms open, not in between mom and baby, nose to nipple, hand at base of head/neck.    Upon re-latching baby a deeper more comfortable latch is achieved with sustained sucks and swallows.    Encouraged family to call for assistance as needed.

## 2024-01-19 NOTE — NURSING NOTE
Discharge education reviewed with patient. Save your life magnet and all handouts given and explained. Patient verbalizes understanding of teaching. All questions answered at this time.

## 2024-01-19 NOTE — LACTATION NOTE
This note was copied from a baby's chart.  Maxwell states baby has just fed, she is still having tender nipples. Reviewed latch and positioning, baby is sleepy now, mom was able to practice positioning but not latch baby.  Worked on positioning infant up at chest level and starting to feed infant with nose arriving at the nipple. Then, using areolar compression to achieve a deep latch that is comfortable and exchanges optimum amounts of milk. I offered suggestions on positioning, for a more optimal latch, showed mom proper positioning, ear, shoulder hip in line, baby's arms open, not in between mom and baby, nose to nipple, hand at base of head/neck.and encouraged her to call for assistance with the next feeding.

## 2024-01-19 NOTE — PLAN OF CARE
Problem: PAIN - ADULT  Goal: Verbalizes/displays adequate comfort level or baseline comfort level  Description: Interventions:  - Encourage patient to monitor pain and request assistance  - Assess pain using appropriate pain scale  - Administer analgesics based on type and severity of pain and evaluate response  - Implement non-pharmacological measures as appropriate and evaluate response  - Consider cultural and social influences on pain and pain management  - Notify physician/advanced practitioner if interventions unsuccessful or patient reports new pain  Outcome: Progressing     Problem: Knowledge Deficit  Goal: Patient/family/caregiver demonstrates understanding of disease process, treatment plan, medications, and discharge instructions  Description: Complete learning assessment and assess knowledge base.  Interventions:  - Provide teaching at level of understanding  - Provide teaching via preferred learning methods  Outcome: Progressing     Problem: INFECTION - ADULT  Goal: Absence or prevention of progression during hospitalization  Description: INTERVENTIONS:  - Assess and monitor for signs and symptoms of infection  - Monitor lab/diagnostic results  - Monitor all insertion sites, i.e. indwelling lines, tubes, and drains  - Monitor endotracheal if appropriate and nasal secretions for changes in amount and color  - [place] appropriate cooling/warming therapies per order  - Administer medications as ordered  - Instruct and encourage patient and family to use good hand hygiene technique  - Identify and instruct in appropriate isolation precautions for identified infection/condition  Outcome: Progressing  Goal: Absence of fever/infection during neutropenic period  Description: INTERVENTIONS:  - Monitor WBC    Outcome: Progressing

## 2024-01-19 NOTE — PROGRESS NOTES
Obstetrics Progress Note  Maxwell Crabtree 28 y.o. female MRN: [id number]  Unit/Bed#: L&D 309-01 Encounter: [id number]    Assessment/Plan:    Postpartum Day #2 s/p 1LTCS (cat II tracing), currently stable. Baby in room. By issue:      * Status post primary low transverse  section  Assessment & Plan       Continue routine post partum care       [id number], HgB 13.3 -> 12.4  Pain well controlled: tylenol/motrin scheduled, dave prn  Lochia within normal limits: continue to monitor   OOB: as able, encourage ambulation  Passing flatus  S/p void trial, voiding spontaneously  DVT ppx: SCD, Lovenox 40mg  Breastfeeding  Baby in: room  Dispo: anticipate d/c home POD2-4      Gestational hypertension  Assessment & Plan  Admission CBC, CMP wnl; urine PC 0.11  Currently asymptomatic  Systolic (12hrs), Av , Min:133 , Max:142   Diastolic (12hrs), Av, Min:76, Max:82  Continue to monitor BP    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 Pain: controlled. Tolerating PO: yes. Voiding: spontaneously. Flatus: passing. Ambulating: yes. Chest pain: no. Shortness of breath: no. Leg pain: no. Lochia: within normal limits. Baby in room, feeding via breast.    Objective:   Vitals:   Temp:  [98 °F (36.7 °C)-98.9 °F (37.2 °C)] 98.2 °F (36.8 °C)  HR:  [70-87] 76  Resp:  [16-18] 16  BP: (130-142)/(76-93) 133/76       Intake/Output Summary (Last 24 hours) at 2024 0625  Last data filed at 2024 1500  Gross per 24 hour   Intake --   Output 1800 ml   Net -1800 ml       Lab Results   Component Value Date    WBC 12.04 (H) 2024    HGB 12.4 2024    HCT 36.5 2024    MCV 89 2024     2024       Physical Exam:   General: alert and oriented x3, in no apparent distress  Cardiovascular: regular rate  Pulmonary: normal effort  Abdomen: Soft, non-tender, non-distended,  no rebound or guarding. Uterine fundus firm and non-tender, at the umbilicus  Incision: clean/dry/intact  Extremities: Non tender with no edema    Gilma Allen MD  OBGYN PGY2  1/19/2024, 6:25 AM

## 2024-01-19 NOTE — PLAN OF CARE
Problem: PAIN - ADULT  Goal: Verbalizes/displays adequate comfort level or baseline comfort level  Description: Interventions:  - Encourage patient to monitor pain and request assistance  - Assess pain using appropriate pain scale  - Administer analgesics based on type and severity of pain and evaluate response  - Implement non-pharmacological measures as appropriate and evaluate response  - Consider cultural and social influences on pain and pain management  - Notify physician/advanced practitioner if interventions unsuccessful or patient reports new pain  Outcome: Progressing     Problem: INFECTION - ADULT  Goal: Absence or prevention of progression during hospitalization  Description: INTERVENTIONS:  - Assess and monitor for signs and symptoms of infection  - Monitor lab/diagnostic results  - Monitor all insertion sites, i.e. indwelling lines, tubes, and drains  - Monitor endotracheal if appropriate and nasal secretions for changes in amount and color  - [place] appropriate cooling/warming therapies per order  - Administer medications as ordered  - Instruct and encourage patient and family to use good hand hygiene technique  - Identify and instruct in appropriate isolation precautions for identified infection/condition  Outcome: Progressing  Goal: Absence of fever/infection during neutropenic period  Description: INTERVENTIONS:  - Monitor WBC    Outcome: Progressing     Problem: SAFETY ADULT  Goal: Patient will remain free of falls  Description: INTERVENTIONS:  - Educate patient/family on patient safety including physical limitations  - Instruct patient to call for assistance with activity   - Consult OT/PT to assist with strengthening/mobility   - Keep Call bell within reach  - Keep bed low and locked with side rails adjusted as appropriate  - Keep care items and personal belongings within reach  - Initiate and maintain comfort rounds  Outcome: Progressing  Goal: Maintain or return to baseline ADL  function  Description: INTERVENTIONS:  -  Assess patient's ability to carry out ADLs; assess patient's baseline for ADL function and identify physical deficits which impact ability to perform ADLs (bathing, care of mouth/teeth, toileting, grooming, dressing, etc.)  - Assess/evaluate cause of self-care deficits   - Assess range of motion  - Assess patient's mobility; develop plan if impaired  - Assess patient's need for assistive devices and provide as appropriate  - Encourage maximum independence but intervene and supervise when necessary  - Involve family in performance of ADLs  - Assess for home care needs following discharge   - Consider OT consult to assist with ADL evaluation and planning for discharge  - Provide patient education as appropriate  Outcome: Progressing  Goal: Maintains/Returns to pre admission functional level  Description: INTERVENTIONS:  - Perform AM-PAC 6 Click Basic Mobility/ Daily Activity assessment daily.  - Set and communicate daily mobility goal to care team and patient/family/caregiver.   - Collaborate with rehabilitation services on mobility goals if consulted  - Record patient progress and toleration of activity level   Outcome: Progressing     Problem: Knowledge Deficit  Goal: Patient/family/caregiver demonstrates understanding of disease process, treatment plan, medications, and discharge instructions  Description: Complete learning assessment and assess knowledge base.  Interventions:  - Provide teaching at level of understanding  - Provide teaching via preferred learning methods  Outcome: Progressing     Problem: DISCHARGE PLANNING  Goal: Discharge to home or other facility with appropriate resources  Description: INTERVENTIONS:  - Identify barriers to discharge w/patient and caregiver  - Arrange for needed discharge resources and transportation as appropriate  - Identify discharge learning needs (meds, wound care, etc.)  - Arrange for interpretive services to assist at discharge  as needed  - Refer to Case Management Department for coordinating discharge planning if the patient needs post-hospital services based on physician/advanced practitioner order or complex needs related to functional status, cognitive ability, or social support system  Outcome: Progressing

## 2024-01-19 NOTE — LACTATION NOTE
This note was copied from a baby's chart.  Breastfeeding well per mom, though Maxwell does have tender nipples, she attributes this to cluster feeding over night, she is using hydrogels, proper use reviewed including to spot use if nipple damage occurs and to not use with other products.  I encouraged Maxwell to call for latch check with next feeding.     Met with mother to review the Discharge Breastfeeding book, including use of the the feeding log, expected number of feedings and output; breastfeeding and your lifestyle( medications, caffeine, drugs, alcohol use); how to recognize and and remedy at home and when to call the doctor for: breast engorgement, block milked ducts and mastitis; storage and preparation of breast milk; cleaning of bottles and pump parts; paced bottle feeding and how to contact the [facility] as needed.

## 2024-01-20 ENCOUNTER — NURSE TRIAGE (OUTPATIENT)
Dept: OTHER | Facility: OTHER | Age: 29
End: 2024-01-20

## 2024-01-20 ENCOUNTER — HOSPITAL ENCOUNTER (INPATIENT)
Facility: HOSPITAL | Age: 29
LOS: 2 days | Discharge: HOME | DRG: 776 | End: 2024-01-23
Attending: EMERGENCY MEDICINE | Admitting: OBSTETRICS & GYNECOLOGY
Payer: COMMERCIAL

## 2024-01-20 DIAGNOSIS — O14.10 SEVERE PRE-ECLAMPSIA, ANTEPARTUM: ICD-10-CM

## 2024-01-20 DIAGNOSIS — R60.0 BILATERAL LOWER EXTREMITY EDEMA: ICD-10-CM

## 2024-01-20 PROCEDURE — 99285 EMERGENCY DEPT VISIT HI MDM: CPT

## 2024-01-20 PROCEDURE — 99283 EMERGENCY DEPT VISIT LOW MDM: CPT

## 2024-01-21 PROBLEM — O14.10 PREECLAMPSIA, SEVERE: Status: ACTIVE | Noted: 2024-01-05

## 2024-01-21 LAB
ALBUMIN SERPL BCP-MCNC: 3.2 G/DL (ref 3.5–5)
ALBUMIN SERPL BCP-MCNC: 3.4 G/DL (ref 3.5–5)
ALBUMIN SERPL BCP-MCNC: 3.5 G/DL (ref 3.5–5)
ALBUMIN SERPL BCP-MCNC: 3.5 G/DL (ref 3.5–5)
ALP SERPL-CCNC: 110 U/L (ref 34–104)
ALP SERPL-CCNC: 120 U/L (ref 34–104)
ALP SERPL-CCNC: 146 U/L (ref 34–104)
ALP SERPL-CCNC: 148 U/L (ref 34–104)
ALT SERPL W P-5'-P-CCNC: 29 U/L (ref 7–52)
ALT SERPL W P-5'-P-CCNC: 30 U/L (ref 7–52)
ALT SERPL W P-5'-P-CCNC: 31 U/L (ref 7–52)
ALT SERPL W P-5'-P-CCNC: 33 U/L (ref 7–52)
ANION GAP SERPL CALCULATED.3IONS-SCNC: 6 MMOL/L
ANION GAP SERPL CALCULATED.3IONS-SCNC: 7 MMOL/L
ANION GAP SERPL CALCULATED.3IONS-SCNC: 7 MMOL/L
ANION GAP SERPL CALCULATED.3IONS-SCNC: 9 MMOL/L
AST SERPL W P-5'-P-CCNC: 24 U/L (ref 13–39)
AST SERPL W P-5'-P-CCNC: 24 U/L (ref 13–39)
AST SERPL W P-5'-P-CCNC: 25 U/L (ref 13–39)
AST SERPL W P-5'-P-CCNC: 31 U/L (ref 13–39)
BASOPHILS # BLD AUTO: 0.01 THOUSANDS/ÂΜL (ref 0–0.1)
BASOPHILS NFR BLD AUTO: 0 % (ref 0–1)
BILIRUB SERPL-MCNC: 0.23 MG/DL (ref 0.2–1)
BILIRUB SERPL-MCNC: 0.25 MG/DL (ref 0.2–1)
BILIRUB SERPL-MCNC: 0.28 MG/DL (ref 0.2–1)
BILIRUB SERPL-MCNC: 0.29 MG/DL (ref 0.2–1)
BUN SERPL-MCNC: 13 MG/DL (ref 5–25)
BUN SERPL-MCNC: 16 MG/DL (ref 5–25)
BUN SERPL-MCNC: 16 MG/DL (ref 5–25)
BUN SERPL-MCNC: 17 MG/DL (ref 5–25)
CALCIUM ALBUM COR SERPL-MCNC: 8.8 MG/DL (ref 8.3–10.1)
CALCIUM ALBUM COR SERPL-MCNC: 9.2 MG/DL (ref 8.3–10.1)
CALCIUM SERPL-MCNC: 7.6 MG/DL (ref 8.4–10.2)
CALCIUM SERPL-MCNC: 8.1 MG/DL (ref 8.4–10.2)
CALCIUM SERPL-MCNC: 8.2 MG/DL (ref 8.4–10.2)
CALCIUM SERPL-MCNC: 8.7 MG/DL (ref 8.4–10.2)
CHLORIDE SERPL-SCNC: 102 MMOL/L (ref 96–108)
CHLORIDE SERPL-SCNC: 103 MMOL/L (ref 96–108)
CHLORIDE SERPL-SCNC: 106 MMOL/L (ref 96–108)
CHLORIDE SERPL-SCNC: 107 MMOL/L (ref 96–108)
CO2 SERPL-SCNC: 25 MMOL/L (ref 21–32)
CO2 SERPL-SCNC: 26 MMOL/L (ref 21–32)
CO2 SERPL-SCNC: 27 MMOL/L (ref 21–32)
CO2 SERPL-SCNC: 27 MMOL/L (ref 21–32)
CREAT SERPL-MCNC: 1.01 MG/DL (ref 0.6–1.3)
CREAT SERPL-MCNC: 1.04 MG/DL (ref 0.6–1.3)
CREAT SERPL-MCNC: 1.07 MG/DL (ref 0.6–1.3)
CREAT SERPL-MCNC: 1.31 MG/DL (ref 0.6–1.3)
CREAT UR-MCNC: 130.3 MG/DL
EOSINOPHIL # BLD AUTO: 0.06 THOUSAND/ÂΜL (ref 0–0.61)
EOSINOPHIL NFR BLD AUTO: 1 % (ref 0–6)
ERYTHROCYTE [DISTWIDTH] IN BLOOD BY AUTOMATED COUNT: 13.3 % (ref 11.6–15.1)
ERYTHROCYTE [DISTWIDTH] IN BLOOD BY AUTOMATED COUNT: 13.4 % (ref 11.6–15.1)
ERYTHROCYTE [DISTWIDTH] IN BLOOD BY AUTOMATED COUNT: 13.5 % (ref 11.6–15.1)
ERYTHROCYTE [DISTWIDTH] IN BLOOD BY AUTOMATED COUNT: 13.5 % (ref 11.6–15.1)
GFR SERPL CREATININE-BSD FRML MDRD: 55 ML/MIN/1.73SQ M
GFR SERPL CREATININE-BSD FRML MDRD: 71 ML/MIN/1.73SQ M
GFR SERPL CREATININE-BSD FRML MDRD: 73 ML/MIN/1.73SQ M
GFR SERPL CREATININE-BSD FRML MDRD: 76 ML/MIN/1.73SQ M
GLUCOSE SERPL-MCNC: 106 MG/DL (ref 65–140)
GLUCOSE SERPL-MCNC: 108 MG/DL (ref 65–140)
GLUCOSE SERPL-MCNC: 77 MG/DL (ref 65–140)
GLUCOSE SERPL-MCNC: 94 MG/DL (ref 65–140)
HCT VFR BLD AUTO: 33.6 % (ref 34.8–46.1)
HCT VFR BLD AUTO: 35.7 % (ref 34.8–46.1)
HCT VFR BLD AUTO: 37.8 % (ref 34.8–46.1)
HCT VFR BLD AUTO: 38.4 % (ref 34.8–46.1)
HGB BLD-MCNC: 11.5 G/DL (ref 11.5–15.4)
HGB BLD-MCNC: 11.7 G/DL (ref 11.5–15.4)
HGB BLD-MCNC: 12.6 G/DL (ref 11.5–15.4)
HGB BLD-MCNC: 12.6 G/DL (ref 11.5–15.4)
IMM GRANULOCYTES # BLD AUTO: 0.03 THOUSAND/UL (ref 0–0.2)
IMM GRANULOCYTES NFR BLD AUTO: 0 % (ref 0–2)
LYMPHOCYTES # BLD AUTO: 1.91 THOUSANDS/ÂΜL (ref 0.6–4.47)
LYMPHOCYTES NFR BLD AUTO: 26 % (ref 14–44)
MAGNESIUM SERPL-MCNC: 2.1 MG/DL (ref 1.9–2.7)
MAGNESIUM SERPL-MCNC: 5.2 MG/DL (ref 1.9–2.7)
MAGNESIUM SERPL-MCNC: 6 MG/DL (ref 1.9–2.7)
MAGNESIUM SERPL-MCNC: 6.1 MG/DL (ref 1.9–2.7)
MCH RBC QN AUTO: 29 PG (ref 26.8–34.3)
MCH RBC QN AUTO: 29.3 PG (ref 26.8–34.3)
MCH RBC QN AUTO: 29.4 PG (ref 26.8–34.3)
MCH RBC QN AUTO: 30.3 PG (ref 26.8–34.3)
MCHC RBC AUTO-ENTMCNC: 32.8 G/DL (ref 31.4–37.4)
MCHC RBC AUTO-ENTMCNC: 32.8 G/DL (ref 31.4–37.4)
MCHC RBC AUTO-ENTMCNC: 33.3 G/DL (ref 31.4–37.4)
MCHC RBC AUTO-ENTMCNC: 34.2 G/DL (ref 31.4–37.4)
MCV RBC AUTO: 88 FL (ref 82–98)
MCV RBC AUTO: 88 FL (ref 82–98)
MCV RBC AUTO: 89 FL (ref 82–98)
MCV RBC AUTO: 89 FL (ref 82–98)
MONOCYTES # BLD AUTO: 0.32 THOUSAND/ÂΜL (ref 0.17–1.22)
MONOCYTES NFR BLD AUTO: 4 % (ref 4–12)
NEUTROPHILS # BLD AUTO: 4.95 THOUSANDS/ÂΜL (ref 1.85–7.62)
NEUTS SEG NFR BLD AUTO: 69 % (ref 43–75)
NRBC BLD AUTO-RTO: 0 /100 WBCS
PLATELET # BLD AUTO: 266 THOUSANDS/UL (ref 149–390)
PLATELET # BLD AUTO: 277 THOUSANDS/UL (ref 149–390)
PLATELET # BLD AUTO: 303 THOUSANDS/UL (ref 149–390)
PLATELET # BLD AUTO: 307 THOUSANDS/UL (ref 149–390)
PMV BLD AUTO: 10 FL (ref 8.9–12.7)
PMV BLD AUTO: 9.2 FL (ref 8.9–12.7)
PMV BLD AUTO: 9.2 FL (ref 8.9–12.7)
PMV BLD AUTO: 9.7 FL (ref 8.9–12.7)
POTASSIUM SERPL-SCNC: 3.6 MMOL/L (ref 3.5–5.3)
POTASSIUM SERPL-SCNC: 3.8 MMOL/L (ref 3.5–5.3)
POTASSIUM SERPL-SCNC: 3.9 MMOL/L (ref 3.5–5.3)
POTASSIUM SERPL-SCNC: 4 MMOL/L (ref 3.5–5.3)
PROT SERPL-MCNC: 5.8 G/DL (ref 6.4–8.4)
PROT SERPL-MCNC: 6.3 G/DL (ref 6.4–8.4)
PROT SERPL-MCNC: 6.5 G/DL (ref 6.4–8.4)
PROT SERPL-MCNC: 6.6 G/DL (ref 6.4–8.4)
PROT UR-MCNC: 11 MG/DL
PROT/CREAT UR: 0.08 MG/G{CREAT} (ref 0–0.1)
RBC # BLD AUTO: 3.8 MILLION/UL (ref 3.81–5.12)
RBC # BLD AUTO: 4 MILLION/UL (ref 3.81–5.12)
RBC # BLD AUTO: 4.29 MILLION/UL (ref 3.81–5.12)
RBC # BLD AUTO: 4.34 MILLION/UL (ref 3.81–5.12)
SODIUM SERPL-SCNC: 137 MMOL/L (ref 135–147)
SODIUM SERPL-SCNC: 137 MMOL/L (ref 135–147)
SODIUM SERPL-SCNC: 138 MMOL/L (ref 135–147)
SODIUM SERPL-SCNC: 140 MMOL/L (ref 135–147)
WBC # BLD AUTO: 6.16 THOUSAND/UL (ref 4.31–10.16)
WBC # BLD AUTO: 6.82 THOUSAND/UL (ref 4.31–10.16)
WBC # BLD AUTO: 6.91 THOUSAND/UL (ref 4.31–10.16)
WBC # BLD AUTO: 7.28 THOUSAND/UL (ref 4.31–10.16)

## 2024-01-21 PROCEDURE — NC001 PR NO CHARGE: Performed by: OBSTETRICS & GYNECOLOGY

## 2024-01-21 PROCEDURE — 84156 ASSAY OF PROTEIN URINE: CPT

## 2024-01-21 PROCEDURE — G0463 HOSPITAL OUTPT CLINIC VISIT: HCPCS

## 2024-01-21 PROCEDURE — 83735 ASSAY OF MAGNESIUM: CPT

## 2024-01-21 PROCEDURE — 36415 COLL VENOUS BLD VENIPUNCTURE: CPT

## 2024-01-21 PROCEDURE — 85025 COMPLETE CBC W/AUTO DIFF WBC: CPT

## 2024-01-21 PROCEDURE — 99213 OFFICE O/P EST LOW 20 MIN: CPT

## 2024-01-21 PROCEDURE — 80053 COMPREHEN METABOLIC PANEL: CPT

## 2024-01-21 PROCEDURE — 85027 COMPLETE CBC AUTOMATED: CPT

## 2024-01-21 PROCEDURE — 82570 ASSAY OF URINE CREATININE: CPT

## 2024-01-21 RX ORDER — CALCIUM GLUCONATE 94 MG/ML
1 INJECTION, SOLUTION INTRAVENOUS ONCE AS NEEDED
Status: DISCONTINUED | OUTPATIENT
Start: 2024-01-21 | End: 2024-01-22

## 2024-01-21 RX ORDER — OXYCODONE HYDROCHLORIDE 5 MG/1
5 TABLET ORAL EVERY 4 HOURS PRN
Status: DISCONTINUED | OUTPATIENT
Start: 2024-01-21 | End: 2024-01-23 | Stop reason: HOSPADM

## 2024-01-21 RX ORDER — ENOXAPARIN SODIUM 100 MG/ML
40 INJECTION SUBCUTANEOUS
Status: DISCONTINUED | OUTPATIENT
Start: 2024-01-21 | End: 2024-01-21

## 2024-01-21 RX ORDER — LABETALOL HYDROCHLORIDE 5 MG/ML
20 INJECTION, SOLUTION INTRAVENOUS ONCE
Status: DISCONTINUED | OUTPATIENT
Start: 2024-01-21 | End: 2024-01-23 | Stop reason: HOSPADM

## 2024-01-21 RX ORDER — MAGNESIUM SULFATE HEPTAHYDRATE 40 MG/ML
4 INJECTION, SOLUTION INTRAVENOUS ONCE
Qty: 100 ML | Refills: 0 | Status: COMPLETED | OUTPATIENT
Start: 2024-01-21 | End: 2024-01-21

## 2024-01-21 RX ORDER — SODIUM CHLORIDE, SODIUM LACTATE, POTASSIUM CHLORIDE, CALCIUM CHLORIDE 600; 310; 30; 20 MG/100ML; MG/100ML; MG/100ML; MG/100ML
50 INJECTION, SOLUTION INTRAVENOUS CONTINUOUS
Status: DISCONTINUED | OUTPATIENT
Start: 2024-01-21 | End: 2024-01-22

## 2024-01-21 RX ORDER — ACETAMINOPHEN 325 MG/1
650 TABLET ORAL EVERY 6 HOURS PRN
Status: DISCONTINUED | OUTPATIENT
Start: 2024-01-21 | End: 2024-01-23 | Stop reason: HOSPADM

## 2024-01-21 RX ORDER — DOCUSATE SODIUM 100 MG/1
100 CAPSULE, LIQUID FILLED ORAL 2 TIMES DAILY PRN
Status: DISCONTINUED | OUTPATIENT
Start: 2024-01-21 | End: 2024-01-23 | Stop reason: HOSPADM

## 2024-01-21 RX ORDER — METOCLOPRAMIDE HYDROCHLORIDE 5 MG/ML
10 INJECTION INTRAMUSCULAR; INTRAVENOUS EVERY 6 HOURS PRN
Status: DISCONTINUED | OUTPATIENT
Start: 2024-01-21 | End: 2024-01-23 | Stop reason: HOSPADM

## 2024-01-21 RX ORDER — NIFEDIPINE 30 MG/1
30 TABLET, EXTENDED RELEASE ORAL DAILY
Status: DISCONTINUED | OUTPATIENT
Start: 2024-01-21 | End: 2024-01-23 | Stop reason: HOSPADM

## 2024-01-21 RX ORDER — CAFFEINE 200 MG
200 TABLET ORAL ONCE
Status: DISCONTINUED | OUTPATIENT
Start: 2024-01-21 | End: 2024-01-21

## 2024-01-21 RX ORDER — MAGNESIUM SULFATE HEPTAHYDRATE 40 MG/ML
2 INJECTION, SOLUTION INTRAVENOUS ONCE
Qty: 50 ML | Refills: 0 | Status: COMPLETED | OUTPATIENT
Start: 2024-01-21 | End: 2024-01-21

## 2024-01-21 RX ORDER — MAGNESIUM SULFATE HEPTAHYDRATE 40 MG/ML
2 INJECTION, SOLUTION INTRAVENOUS CONTINUOUS
Status: DISCONTINUED | OUTPATIENT
Start: 2024-01-21 | End: 2024-01-22

## 2024-01-21 RX ORDER — CAFFEINE 200 MG
200 TABLET ORAL ONCE AS NEEDED
Status: COMPLETED | OUTPATIENT
Start: 2024-01-21 | End: 2024-01-21

## 2024-01-21 RX ORDER — CAFFEINE 200 MG
200 TABLET ORAL EVERY 6 HOURS PRN
Status: COMPLETED | OUTPATIENT
Start: 2024-01-21 | End: 2024-01-21

## 2024-01-21 RX ORDER — ENOXAPARIN SODIUM 100 MG/ML
40 INJECTION SUBCUTANEOUS
Status: DISCONTINUED | OUTPATIENT
Start: 2024-01-21 | End: 2024-01-23 | Stop reason: HOSPADM

## 2024-01-21 RX ADMIN — MAGNESIUM SULFATE HEPTAHYDRATE 2 G/HR: 40 INJECTION, SOLUTION INTRAVENOUS at 02:09

## 2024-01-21 RX ADMIN — OXYCODONE HYDROCHLORIDE 5 MG: 5 TABLET ORAL at 12:19

## 2024-01-21 RX ADMIN — MAGNESIUM SULFATE HEPTAHYDRATE 2 G/HR: 40 INJECTION, SOLUTION INTRAVENOUS at 12:20

## 2024-01-21 RX ADMIN — OXYCODONE HYDROCHLORIDE 5 MG: 5 TABLET ORAL at 20:18

## 2024-01-21 RX ADMIN — OXYCODONE HYDROCHLORIDE 5 MG: 5 TABLET ORAL at 16:30

## 2024-01-21 RX ADMIN — MAGNESIUM SULFATE HEPTAHYDRATE 2 G: 40 INJECTION, SOLUTION INTRAVENOUS at 02:00

## 2024-01-21 RX ADMIN — NIFEDIPINE 30 MG: 30 TABLET, FILM COATED, EXTENDED RELEASE ORAL at 08:07

## 2024-01-21 RX ADMIN — ACETAMINOPHEN 325MG 650 MG: 325 TABLET ORAL at 13:45

## 2024-01-21 RX ADMIN — PRENATAL VIT W/ FE FUMARATE-FA TAB 27-0.8 MG 1 TABLET: 27-0.8 TAB at 08:06

## 2024-01-21 RX ADMIN — ACETAMINOPHEN 325MG 650 MG: 325 TABLET ORAL at 08:07

## 2024-01-21 RX ADMIN — MAGNESIUM SULFATE HEPTAHYDRATE 4 G: 4 INJECTION, SOLUTION INTRAVENOUS at 01:42

## 2024-01-21 RX ADMIN — CAFFEINE 200 MG: 200 TABLET ORAL at 10:12

## 2024-01-21 RX ADMIN — METOCLOPRAMIDE 10 MG: 5 INJECTION, SOLUTION INTRAMUSCULAR; INTRAVENOUS at 10:12

## 2024-01-21 RX ADMIN — METOCLOPRAMIDE 10 MG: 5 INJECTION, SOLUTION INTRAMUSCULAR; INTRAVENOUS at 21:59

## 2024-01-21 RX ADMIN — METOCLOPRAMIDE 10 MG: 5 INJECTION, SOLUTION INTRAMUSCULAR; INTRAVENOUS at 15:35

## 2024-01-21 RX ADMIN — CAFFEINE 200 MG: 200 TABLET ORAL at 15:35

## 2024-01-21 RX ADMIN — ENOXAPARIN SODIUM 40 MG: 40 INJECTION SUBCUTANEOUS at 08:07

## 2024-01-21 RX ADMIN — MAGNESIUM SULFATE HEPTAHYDRATE 2 G/HR: 40 INJECTION, SOLUTION INTRAVENOUS at 22:08

## 2024-01-21 RX ADMIN — ACETAMINOPHEN 325MG 650 MG: 325 TABLET ORAL at 19:30

## 2024-01-21 RX ADMIN — ACETAMINOPHEN 325MG 650 MG: 325 TABLET ORAL at 02:07

## 2024-01-21 RX ADMIN — SODIUM CHLORIDE, SODIUM LACTATE, POTASSIUM CHLORIDE, AND CALCIUM CHLORIDE 50 ML/HR: .6; .31; .03; .02 INJECTION, SOLUTION INTRAVENOUS at 01:42

## 2024-01-21 NOTE — H&P
H & P- Obstetrics   Maxwell Crabtree 28 y.o. female MRN: [id number]  Unit/Bed#: L&D 327-01 Encounter: [id number]    Assessment: 28 y.o.  at 39w3d admitted for preeclampsia with severe features, on postop day 4 from a primary low-transverse  section.      Plan:   Status post primary low transverse  section  Assessment & Plan  Continue routine postoperative/postpartum care    Heterozygous for prothrombin [id number] mutation (HCC)  Assessment & Plan  Continue home Lovenox 40mg daily     * Preeclampsia, severe  Assessment & Plan  Patient met criteria for gestational hypertension intrapartum, preeclampsia labs were normal at that time and blood pressures did not require treatment  Patient now meets criteria for severe features secondary to sustained severe range blood pressures in the emergency room as well as a creatinine of 1.3  Systolic (24hrs), Av , Min:134 , Max:175      Diastolic (24hrs), Av, Min:75, Max:99    PreE Labs: CBC wnl, CMP: Cr of 1.3  P:C ratio: 0.08                Discussed case and plan w/ Dr. Santiago      Chief Complaint: High blood pressures and swelling at home    HPI: Maxwell Crabtree is a 28 y.o.  who is currently postop day 4 following a primary low-transverse  section in the setting of a category 2 fetal heart tracing.  Patient met criteria for gestational hypertension during her labor course.  She was discharged on postop day 2 without complication.   Patient was taking her blood pressure at home when she noticed that it was high she also noted acute onset edema of her bilateral lower extremities.  She therefore called the nurse health line and was instructed to present to the emergency room.   Upon presentation to the emergency room patient was noted to have sustained severe range blood pressures.  20 mg of labetalol IV was ordered but not given as patient's blood pressure was noted to be 150s over 80s prior to administration patient was subsequently  admitted to labor and delivery.   Patient is currently denying any signs or symptoms of preeclampsia including headache, vision changes, difficulty breathing, pain in her chest.   We discussed that we will begin magnesium for 24 hours and discharged home following discontinuation of magnesium will be dependent on her blood pressures and serial lab work.   All questions answered to patient's satisfaction    Patient Active Problem List   Diagnosis    Attention deficit hyperactivity disorder (ADHD)    Prothrombin gene mutation (HCC)    39 weeks gestation of pregnancy    Heterozygous for prothrombin [id number] mutation ([facility])    Thrombophilia complicating pregnancy in third trimester, antepartum (HCC)    Lactating mother    Third trimester pregnancy    Preeclampsia, severe    Hypertension affecting pregnancy    Encounter for induction of labor    Status post primary low transverse  section       Review of Systems   Constitutional:  Negative for chills and fever.   Respiratory:  Negative for cough, shortness of breath and wheezing.    Cardiovascular:  Negative for chest pain and leg swelling.   Gastrointestinal:  Negative for abdominal pain, diarrhea, nausea and vomiting.   Genitourinary:  Negative for pelvic pain, vaginal bleeding and vaginal discharge.   Musculoskeletal:  Negative for back pain.   Neurological:  Negative for weakness, light-headedness and headaches.       OB Hx:  OB History    Para Term  AB Living   2 1 1 0 1 1   SAB IAB Ectopic Multiple Live Births   1 0 0 0 1      # Outcome Date GA Lbr [place]/2nd Weight Sex Delivery Anes PTL Lv   2 Term 24 39w3d  3870 g (8 lb 8.5 oz) M CS-LTranv EPI N ADRIANO      Complications: Fetal Intolerance   1 SAB               Obstetric Comments   Menarche age 11   bcp-8yrs       Past Medical Hx:  Past Medical History:   Diagnosis Date    ADHD     Asthma     Fibroadenoma of breast, left, right     Prothrombin gene mutation (HCC)        Past Surgical hx:  Past  Surgical History:   Procedure Laterality Date    SC  DELIVERY ONLY N/A 2024    Procedure:  SECTION ();  Surgeon: Jacqui Banegas MD;  [facility]: [facility];  Service: Obstetrics    WISDOM TOOTH EXTRACTION      WISDOM TOOTH EXTRACTION         Social Hx:  Alcohol use: denies  Tobacco use: denies  Other substance use: denies    Allergies   Allergen Reactions    Shellfish-Derived Products - Food Allergy Swelling       Medications Prior to Admission   Medication    acetaminophen (TYLENOL) 650 mg CR tablet    APNO compound (mupirocin ointment 2%-betamethasone ointment 0.1%-miconazole powder 2%)    benzocaine-menthol-lanolin-aloe (DERMOPLAST) 20-0.5 % topical spray    enoxaparin (LOVENOX) 40 mg/0.4 mL    ibuprofen (MOTRIN) 600 mg tablet    oxyCODONE (Roxicodone) 5 immediate release tablet    Prenatal Vit w/Yu-Fzusebmnj-DG (PNV PO)    witch hazel-glycerin (TUCKS) topical pad       Objective:  Temp:  [98.1 °F (36.7 °C)] 98.1 °F (36.7 °C)  HR:  [56-73] 60  Resp:  [18] 18  BP: (134-175)/(75-99) 134/75  Body mass index is 33.88 kg/m².     Physical Exam:  Physical Exam  Constitutional:       Appearance: Normal appearance.   Cardiovascular:      Rate and Rhythm: Normal rate and regular rhythm.   Pulmonary:      Effort: Pulmonary effort is normal. No respiratory distress.   Abdominal:      Palpations: Abdomen is soft.      Tenderness: There is no abdominal tenderness.   Musculoskeletal:         General: No swelling or tenderness.   Neurological:      General: No focal deficit present.      Mental Status: She is alert and oriented to person, place, and time.   Skin:     General: Skin is warm and dry.   Vitals reviewed.            Lab Results   Component Value Date    WBC 7.28 2024    HGB 11.7 2024    HCT 35.7 2024     2024     Lab Results   Component Value Date    K 4.0 2024     2024    CO2 27 2024    BUN 17 2024    CREATININE 1.31 (H) 2024     AST 31 01/21/2024    ALT 33 01/21/2024     >2 Midnights  INPATIENT     Signature/Title: Germaine Stringer MD  Date: 1/21/2024  Time: 2:05 AM

## 2024-01-21 NOTE — TELEPHONE ENCOUNTER
Postpartum:  C-Sec 1/17/24. Discharged 1/19  c/o elevated BP of 158/84, and 154/81, with +4 BLE edema ascending to bilateral thighs, and bilateral hands. States worsening since discharge.   No additional symptoms.   Diagnosed with Gestational HTN. No medications.     On call provider contacted and informed of patient's concerns and status.    Provider recommends coming in for evaluation  in ED [facility]    Patient informed of provider's recommendation. Verbalized understanding. Agreeable with disposition. No further questions.

## 2024-01-21 NOTE — ASSESSMENT & PLAN NOTE
Patient met criteria for gestational hypertension intrapartum, preeclampsia labs were normal at that time and blood pressures did not require treatment  She was readmitted after meeting criteria for severe features secondary to sustained severe range blood pressures in the emergency room as well as a creatinine of 1.3  Systolic (24hrs), Av , Min:125 , Max:151      Diastolic (24hrs), Av, Min:75, Max:96    PreE Labs: CBC wnl, CMP: Cr of 1.3  P:C ratio: 0.08    : S/p Magnesium for seizure ppx, Procardia 30mg daily  : unsustained SRBp   : BP well controlled on procardia 30mg daily

## 2024-01-21 NOTE — TELEPHONE ENCOUNTER
"Regarding: Eleveated Blood pressure 158/84  ----- Message from Kelli Schmidt sent at 1/20/2024  9:58 PM EST -----  \" I had a c section 1/17 and was discharged on 1/19, My bp is a little elevated 158/84\"    "

## 2024-01-21 NOTE — TELEPHONE ENCOUNTER
"Reason for Disposition  • [1] Postpartum < 6 weeks AND [2] Systolic BP  >= 140 OR Diastolic >= 90    Answer Assessment - Initial Assessment Questions  1. BLOOD PRESSURE: \"What is the blood pressure?\" \"Did you take at least two measurements 5 minutes apart?\"      158/84, and 154/81  2. ONSET: \"When did you take your blood pressure?\"  2150  3. HOW: \"How did you obtain the blood pressure?\" (e.g., visiting nurse, automatic home BP monitor)      Automatic   4. HISTORY: \"Do you have a history of high blood pressure?\"      Gestational HTN   5. MEDICATIONS: \"Are you taking any medications for blood pressure?\" \"Have you missed any doses recently?\"      Denies   6. OTHER SYMPTOMS: \"Do you have any symptoms?\" (e.g., headache, chest pain, blurred vision, difficulty breathing, weakness)      +4 pitting edema BLE ascending up to Bilateral thighs   Bilateral hands edema   7. PREGNANCY: \"Is there any chance you are pregnant?\" \"When was your last menstrual period?\"      Postpartum    Protocols used: Blood Pressure - High-ADULT-AH    "

## 2024-01-21 NOTE — ED PROVIDER NOTES
"History  Chief Complaint   Patient presents with    Hypertension      , gestational HTN, induced 39 weeks. Reports BP at home 158/86. Reports b/l lower ext. Swelling. Patient reports h/o blood clotting disorder, d/c'd home with lovenox.      28-year-old  female with PMH of prothrombin gene mutation, 3 days postpartum, presenting for evaluation of hypertension and lower extremity edema.  Patient explains that she did not have hypertension throughout the majority of her pregnancy, however developed hypertension ~140/90 mmHg at 39 weeks so she was induced.  Reportedly had 3 different epidurals as they were not working adequately, as well as \"a lot of fluids\".  Explains that she was not dilating beyond 5 cm, and fetal heart rate was beginning to slow, so was taken in for .  She noticed that she had some bilateral lower extremity edema upon discharge, however today it greatly worsened a few hours prior to arrival.  Feels tight, however denies substantial pain, ambulatory without difficulty, sensation and motor grossly intact.  After noticing her swelling worsening, she took her blood pressure and noted that it was elevated in the 150s over 80s.    Patient does have some lower abdominal bloating, but denies significant abdominal tenderness.  Admits to clean, dry, and intact Pfannenstiel incision site without noticeable skin changes.  Denies chest pain, SOB, palpitations, syncope.  Denies neurological features such as headache, vision/auditory disturbances, tinnitus, gait abnormalities, weakness, paresthesias.        Prior to Admission Medications   Prescriptions Last Dose Informant Patient Reported? Taking?   APNO compound (mupirocin ointment 2%-betamethasone ointment 0.1%-miconazole powder 2%)   No No   Sig: Apply a small amount to nipple after each feeding. Clean nipple w/soap and water before next feeding.   Prenatal Vit w/Sr-Lyqocflwy-YY (PNV PO)  Self Yes No   Sig: Take 1 tablet by mouth in " the morning   acetaminophen (TYLENOL) 650 mg CR tablet   No No   Sig: Take 1 tablet (650 mg total) by mouth every 8 (eight) hours as needed for mild pain   benzocaine-menthol-lanolin-aloe (DERMOPLAST) 20-0.5 % topical spray   No No   Sig: Apply 1 Application topically 4 (four) times a day as needed for mild pain or irritation   enoxaparin (LOVENOX) 40 mg/0.4 mL   No No   Sig: Inject 0.4 mL (40 mg total) under the skin daily at bedtime   ibuprofen (MOTRIN) 600 mg tablet   No No   Sig: Take 1 tablet (600 mg total) by mouth every 6 (six) hours   oxyCODONE (Roxicodone) 5 immediate release tablet   No No   Sig: Take 1 tablet (5 mg total) by mouth every 4 (four) hours as needed for moderate pain for up to 10 doses Max Daily Amount: 30 mg   witch hazel-glycerin (TUCKS) topical pad   No No   Sig: Apply 1 Pad topically every 4 (four) hours as needed for irritation      Facility-Administered Medications: None       Past Medical History:   Diagnosis Date    ADHD     Asthma     Fibroadenoma of breast, left, right     Prothrombin gene mutation (HCC)        Past Surgical History:   Procedure Laterality Date    [place]  DELIVERY ONLY N/A 2024    Procedure:  SECTION ();  Surgeon: Jacqui Banegas MD;  Location: [facility];  Service: Obstetrics    WISDOM TOOTH EXTRACTION      WISDOM TOOTH EXTRACTION         Family History   Problem Relation Age of Onset    Deep vein thrombosis Mother     Other Mother         Prothrombin gene mutation    Thyroid disease Mother     No Known Problems Father     No Known Problems Maternal Grandmother     Diabetes Maternal Grandfather     Heart disease Maternal Grandfather     Heart attack Maternal Grandfather     No Known Problems Paternal Grandmother     Heart disease Paternal Grandfather     Heart attack Paternal Grandfather     Breast cancer Other 60    Lung cancer Other     Colon cancer Neg Hx     Ovarian cancer Neg Hx      I have reviewed and agree with the history as  documented.    E-Cigarette/Vaping    E-Cigarette Use Never User      E-Cigarette/Vaping Substances    Nicotine No     THC No     CBD No     Flavoring No     Other No     Unknown No      Social History     Tobacco Use    Smoking status: Never    Smokeless tobacco: Never   Vaping Use    Vaping status: Never Used   Substance Use Topics    Alcohol use: Not Currently     Comment: occasional    Drug use: Never       Review of Systems   Constitutional:  Negative for chills and fever.   HENT:  Negative for ear pain and sore throat.    Eyes:  Negative for pain and visual disturbance.   Respiratory:  Negative for cough and shortness of breath.    Cardiovascular:  Positive for leg swelling. Negative for chest pain and palpitations.        Elevated BP.   Gastrointestinal:  Negative for abdominal pain and vomiting.   Genitourinary:  Negative for dysuria and hematuria.   Musculoskeletal:  Negative for arthralgias and back pain.   Skin:  Negative for color change and rash.   Neurological:  Negative for seizures and syncope.   All other systems reviewed and are negative.      Physical Exam  Physical Exam  Vitals and nursing note reviewed.   Constitutional:       General: She is not in acute distress.     Appearance: Normal appearance. She is well-developed. She is not ill-appearing.   HENT:      Head: Normocephalic and atraumatic.   Eyes:      Conjunctiva/sclera: Conjunctivae normal.   Cardiovascular:      Rate and Rhythm: Normal rate and regular rhythm.      Heart sounds: No murmur heard.  Pulmonary:      Effort: Pulmonary effort is normal. No respiratory distress.      Breath sounds: Normal breath sounds.   Abdominal:      General: Abdomen is protuberant. A surgical scar is present. There is distension (mild).      Palpations: Abdomen is soft.      Tenderness: There is no abdominal tenderness. There is no guarding.   Musculoskeletal:         General: No swelling.      Cervical back: Neck supple.      Right lower leg: Edema  present.      Left lower leg: Edema present.   Skin:     General: Skin is warm and dry.      Capillary Refill: Capillary refill takes less than 2 seconds.   Neurological:      Mental Status: She is alert.   Psychiatric:         Mood and Affect: Mood normal.         Vital Signs  ED Triage Vitals   Temperature Pulse Respirations Blood Pressure SpO2   01/20/24 2344 01/20/24 2344 01/20/24 2344 01/20/24 2344 01/20/24 2344   98.1 °F (36.7 °C) 73 18 157/97 99 %      Temp Source Heart Rate Source Patient Position - Orthostatic VS BP Location FiO2 (%)   01/20/24 2344 01/20/24 2344 01/20/24 2344 01/20/24 2344 --   Oral Monitor Sitting Right arm       Pain Score       01/21/24 0207       3           Vitals:    01/21/24 0113 01/21/24 0152 01/21/24 0209 01/21/24 0239   BP: 142/83 134/75 126/77 139/91   Pulse: 56 60 67 65   Patient Position - Orthostatic VS: Lying            Visual Acuity      ED Medications  Medications   labetalol (NORMODYNE) injection 20 mg (0 mg Intravenous Hold 1/21/24 0100)   lactated ringers infusion (50 mL/hr Intravenous New Bag 1/21/24 0142)   magnesium sulfate 20 g/500 mL infusion (premix) (2 g/hr Intravenous New Bag 1/21/24 0209)   calcium gluconate 10 % injection 1 g (has no administration in time range)   prenatal multivitamin tablet 1 tablet (has no administration in time range)   acetaminophen (TYLENOL) tablet 650 mg (650 mg Oral Given 1/21/24 0207)   benzocaine-menthol-lanolin-aloe (DERMOPLAST) 20-0.5 % topical spray 1 Application (has no administration in time range)   oxyCODONE (ROXICODONE) IR tablet 5 mg (has no administration in time range)   witch hazel-glycerin (TUCKS) topical pad 1 Pad (has no administration in time range)   docusate sodium (COLACE) capsule 100 mg (has no administration in time range)   enoxaparin (LOVENOX) subcutaneous injection 40 mg (has no administration in time range)   NIFEdipine (PROCARDIA XL) 24 hr tablet 30 mg (has no administration in time range)   magnesium  sulfate 4 g/100 mL IVPB (premix) 4 g (0 g Intravenous Stopped 1/21/24 0202)     Followed by   magnesium sulfate 2 g/50 mL IVPB (premix) 2 g (0 g Intravenous Stopped 1/21/24 0210)       Diagnostic Studies  Results Reviewed       Procedure Component Value Units Date/Time    Comprehensive metabolic panel [[id number]]  (Abnormal) Collected: 01/21/24 0027    Lab Status: Final result Specimen: Blood from Arm, Right Updated: 01/21/24 0049     Sodium 140 mmol/L      Potassium 4.0 mmol/L      Chloride 106 mmol/L      CO2 27 mmol/L      ANION GAP 7 mmol/L      BUN 17 mg/dL      Creatinine 1.31 mg/dL      Glucose 77 mg/dL      Calcium 8.7 mg/dL      Corrected Calcium 9.2 mg/dL      AST 31 U/L      ALT 33 U/L      Alkaline Phosphatase 120 U/L      Total Protein 6.3 g/dL      Albumin 3.4 g/dL      Total Bilirubin 0.28 mg/dL      eGFR 55 ml/min/1.73sq m     Narrative:      National Kid[employer] Disease Foundation guidelines for Chronic Kidney Disease (CKD):     Stage 1 with normal or high GFR (GFR > 90 mL/min/1.73 square meters)    Stage 2 Mild CKD (GFR = 60-89 mL/min/1.73 square meters)    Stage 3A Moderate CKD (GFR = 45-59 mL/min/1.73 square meters)    Stage 3B Moderate CKD (GFR = 30-44 mL/min/1.73 square meters)    Stage 4 Severe CKD (GFR = 15-29 mL/min/1.73 square meters)    Stage 5 End Stage CKD (GFR <15 mL/min/1.73 square meters)  Note: GFR calculation is accurate only with a steady state creatinine    Magnesium [[id number]]  (Normal) Collected: 01/21/24 0027    Lab Status: Final result Specimen: Blood from Arm, Right Updated: 01/21/24 0049     Magnesium 2.1 mg/dL     Protein / creatinine ratio, urine [[id number]] Collected: 01/21/24 0027    Lab Status: Final result Specimen: Urine, Clean Catch Updated: 01/21/24 0047     Creatinine, Ur 130.3 mg/dL      Protein Urine Random 11 mg/dL      Prot/Creat Ratio, Ur 0.08    CBC and differential [[id number]] Collected: 01/21/24 0027    Lab Status: Final result Specimen: Blood from Arm, Right  Updated: 24 0041     WBC 7.28 Thousand/uL      RBC 4.00 Million/uL      Hemoglobin 11.7 g/dL      Hematocrit 35.7 %      MCV 89 fL      MCH 29.3 pg      MCHC 32.8 g/dL      RDW 13.4 %      MPV 10.0 fL      Platelets 266 Thousands/uL      nRBC 0 /100 WBCs      Neutrophils Relative 69 %      Immat GRANS % 0 %      Lymphocytes Relative 26 %      Monocytes Relative 4 %      Eosinophils Relative 1 %      Basophils Relative 0 %      Neutrophils Absolute 4.95 Thousands/µL      Immature Grans Absolute 0.03 Thousand/uL      Lymphocytes Absolute 1.91 Thousands/µL      Monocytes Absolute 0.32 Thousand/µL      Eosinophils Absolute 0.06 Thousand/µL      Basophils Absolute 0.01 Thousands/µL                    No orders to display              Procedures  Procedures         ED Course                               SBIRT 22yo+      Flowsheet Row Most Recent Value   Initial Alcohol Screen: US AUDIT-C     1. How often do you have a drink containing alcohol? 0 Filed at: 2024 2345   2. How many drinks containing alcohol do you have on a typical day you are drinking?  0 Filed at: 2024   3b. FEMALE Any Age, or MALE 65+: How often do you have 4 or more drinks on one occassion? 0 Filed at: 2024   Audit-C Score 0 Filed at: 2024   ALICIA: How many times in the past year have you...    Used an illegal drug or used a prescription medication for non-medical reasons? Never Filed at: 2024 234                      Medical Decision Making  28-year-old female, G1, P1, 3 days postpartum following  presents for evaluation of elevated BP and bilateral lower extremity edema.  Exam: Patient in NAD, AOx3, hypertensive on arrival at 157/97, rest of vitals WNL; bilateral lower legs are symmetrically edematous, nonpitting; sensation, motor grossly intact, +2 DP pulses bilaterally; no noticeable warmth, pallor, erythema, ecchymosis, tenderness.  Abdomen mildly distended, primarily in the lower abdomen,  however incision site appears clean, dry, intact without any evidence of erythema, induration, purulence.    Considering patient is 3 days postpartum and presenting with signs and symptoms concerning for preeclampsia, OB/GYN resident was consulted.  Discussed case with Germaine Stringer, OB/Gyn.  She recommended that we obtain preeclampsia labs including CBC, CMP, and protein creatinine urine ratio.  Recommended monitor blood pressure and alert them if they continue to elevate.    Patient's blood pressure was remeasured and noted to be elevated at 169/99.  Subsequently noted to be 175/21.  Advised OB/GYN resident.  At this point they determined that patient was demonstrating increasing risk for preeclampsia, ordered labetalol, magnesium, additional lab work, and requested that she be brought up to their service following labetalol push.  However, on reassessment blood pressure did come down to 150/84, so labetalol was not initiated while in the ER.  Patient was moved up upstairs, patient care was transferred to OB/GYN service.    Amount and/or Complexity of Data Reviewed  Labs: ordered.             Disposition  Final diagnoses:   Postpartum hypertension   Bilateral lower extremity edema     Time reflects when diagnosis was documented in both MDM as applicable and the Disposition within this note       Time User Action Codes Description Comment    1/21/2024 12:37 AM Santy Pride Add [O16.5] Postpartum hypertension     1/21/2024 12:37 AM Santy Pride Add [R60.0] Bilateral lower extremity edema           ED Disposition       ED Disposition   Send to L&D After Provider Eval    Condition   --    Date/Time   Sun Jan 21, 2024 0036    Comment   --             Follow-up Information    None         Current Discharge Medication List        CONTINUE these medications which have NOT CHANGED    Details   acetaminophen (TYLENOL) 650 mg CR tablet Take 1 tablet (650 mg total) by mouth every 8 (eight) hours as needed for mild pain  Qty:  30 tablet, Refills: 0    Associated Diagnoses: Status post primary low transverse  section      APNO compound (mupirocin ointment 2%-betamethasone ointment 0.1%-miconazole powder 2%) Apply a small amount to nipple after each feeding. Clean nipple w/soap and water before next feeding.  Qty: 30 g, Refills: 5    Associated Diagnoses: Lactating mother      benzocaine-menthol-lanolin-aloe (DERMOPLAST) 20-0.5 % topical spray Apply 1 Application topically 4 (four) times a day as needed for mild pain or irritation    Associated Diagnoses: Status post primary low transverse  section      enoxaparin (LOVENOX) 40 mg/0.4 mL Inject 0.4 mL (40 mg total) under the skin daily at bedtime  Qty: 16 mL, Refills: 0    Associated Diagnoses: Status post primary low transverse  section      ibuprofen (MOTRIN) 600 mg tablet Take 1 tablet (600 mg total) by mouth every 6 (six) hours  Qty: 50 tablet, Refills: 0    Associated Diagnoses: Status post primary low transverse  section      oxyCODONE (Roxicodone) 5 immediate release tablet Take 1 tablet (5 mg total) by mouth every 4 (four) hours as needed for moderate pain for up to 10 doses Max Daily Amount: 30 mg  Qty: 10 tablet, Refills: 0    Associated Diagnoses: Status post primary low transverse  section      Prenatal Vit w/Mu-Hqeugmcgj-TY (PNV PO) Take 1 tablet by mouth in the morning      witch hazel-glycerin (TUCKS) topical pad Apply 1 Pad topically every 4 (four) hours as needed for irritation    Associated Diagnoses: Status post primary low transverse  section             No discharge procedures on file.    PDMP Review         Value Time User    PDMP Reviewed  Yes 2023 10:21 AM Ye Allan MD            ED Provider  Electronically Signed by             Santy Pride PA-C  [phone]

## 2024-01-21 NOTE — LACTATION NOTE
Lactation Consultation   Reason for Consult 20 m   Maternal Information   Has mother  before? No   Breasts/Nipples   Left Breast Firm   Right Breast Firm   Left Nipple Everted;Tender   Right Nipple Everted   Breastfeeding Status Yes   Breastfeeding Progress Breastfeeding well   Breast Pump   Pump 3  (Has a [employer] pump)   Patient Follow-Up   Lactation Consult Status 2   Follow-Up Type Inpatient;Call as needed   Other OB Lactation Documentation    Additional Problem Noted Readmit for preeclampsia. Reports milk came in yesterday. Jamesion is breast feeding well by reports of Maxwell. pumped one time for engorgement     Encouraged parents to call for assistance, questions, and concerns about breastfeeding.  Extension provided.

## 2024-01-22 PROBLEM — N17.9 ACUTE KIDNEY INJURY (HCC): Status: ACTIVE | Noted: 2024-01-22

## 2024-01-22 LAB
ALBUMIN SERPL BCP-MCNC: 3.3 G/DL (ref 3.5–5)
ALBUMIN SERPL BCP-MCNC: 3.6 G/DL (ref 3.5–5)
ALBUMIN SERPL BCP-MCNC: 3.6 G/DL (ref 3.5–5)
ALBUMIN SERPL BCP-MCNC: 3.7 G/DL (ref 3.5–5)
ALP SERPL-CCNC: 138 U/L (ref 34–104)
ALP SERPL-CCNC: 139 U/L (ref 34–104)
ALP SERPL-CCNC: 142 U/L (ref 34–104)
ALP SERPL-CCNC: 145 U/L (ref 34–104)
ALT SERPL W P-5'-P-CCNC: 25 U/L (ref 7–52)
ALT SERPL W P-5'-P-CCNC: 26 U/L (ref 7–52)
ANION GAP SERPL CALCULATED.3IONS-SCNC: 6 MMOL/L
ANION GAP SERPL CALCULATED.3IONS-SCNC: 7 MMOL/L
ANION GAP SERPL CALCULATED.3IONS-SCNC: 8 MMOL/L
ANION GAP SERPL CALCULATED.3IONS-SCNC: 9 MMOL/L
AST SERPL W P-5'-P-CCNC: 19 U/L (ref 13–39)
AST SERPL W P-5'-P-CCNC: 19 U/L (ref 13–39)
AST SERPL W P-5'-P-CCNC: 20 U/L (ref 13–39)
AST SERPL W P-5'-P-CCNC: 20 U/L (ref 13–39)
BILIRUB SERPL-MCNC: 0.21 MG/DL (ref 0.2–1)
BILIRUB SERPL-MCNC: 0.27 MG/DL (ref 0.2–1)
BILIRUB SERPL-MCNC: 0.28 MG/DL (ref 0.2–1)
BILIRUB SERPL-MCNC: 0.3 MG/DL (ref 0.2–1)
BUN SERPL-MCNC: 16 MG/DL (ref 5–25)
BUN SERPL-MCNC: 18 MG/DL (ref 5–25)
BUN SERPL-MCNC: 18 MG/DL (ref 5–25)
BUN SERPL-MCNC: 20 MG/DL (ref 5–25)
CALCIUM ALBUM COR SERPL-MCNC: 7.7 MG/DL (ref 8.3–10.1)
CALCIUM SERPL-MCNC: 7.1 MG/DL (ref 8.4–10.2)
CALCIUM SERPL-MCNC: 7.3 MG/DL (ref 8.4–10.2)
CALCIUM SERPL-MCNC: 7.8 MG/DL (ref 8.4–10.2)
CALCIUM SERPL-MCNC: 8.3 MG/DL (ref 8.4–10.2)
CHLORIDE SERPL-SCNC: 104 MMOL/L (ref 96–108)
CHLORIDE SERPL-SCNC: 105 MMOL/L (ref 96–108)
CHLORIDE SERPL-SCNC: 105 MMOL/L (ref 96–108)
CHLORIDE SERPL-SCNC: 106 MMOL/L (ref 96–108)
CO2 SERPL-SCNC: 25 MMOL/L (ref 21–32)
CO2 SERPL-SCNC: 26 MMOL/L (ref 21–32)
CO2 SERPL-SCNC: 26 MMOL/L (ref 21–32)
CO2 SERPL-SCNC: 27 MMOL/L (ref 21–32)
CREAT SERPL-MCNC: 0.92 MG/DL (ref 0.6–1.3)
CREAT SERPL-MCNC: 0.97 MG/DL (ref 0.6–1.3)
CREAT SERPL-MCNC: 1.1 MG/DL (ref 0.6–1.3)
CREAT SERPL-MCNC: 1.19 MG/DL (ref 0.6–1.3)
ERYTHROCYTE [DISTWIDTH] IN BLOOD BY AUTOMATED COUNT: 13.4 % (ref 11.6–15.1)
GFR SERPL CREATININE-BSD FRML MDRD: 62 ML/MIN/1.73SQ M
GFR SERPL CREATININE-BSD FRML MDRD: 68 ML/MIN/1.73SQ M
GFR SERPL CREATININE-BSD FRML MDRD: 80 ML/MIN/1.73SQ M
GFR SERPL CREATININE-BSD FRML MDRD: 85 ML/MIN/1.73SQ M
GLUCOSE SERPL-MCNC: 115 MG/DL (ref 65–140)
GLUCOSE SERPL-MCNC: 83 MG/DL (ref 65–140)
GLUCOSE SERPL-MCNC: 93 MG/DL (ref 65–140)
GLUCOSE SERPL-MCNC: 98 MG/DL (ref 65–140)
HCT VFR BLD AUTO: 36.2 % (ref 34.8–46.1)
HGB BLD-MCNC: 12.1 G/DL (ref 11.5–15.4)
MAGNESIUM SERPL-MCNC: 6.2 MG/DL (ref 1.9–2.7)
MCH RBC QN AUTO: 29.8 PG (ref 26.8–34.3)
MCHC RBC AUTO-ENTMCNC: 33.4 G/DL (ref 31.4–37.4)
MCV RBC AUTO: 89 FL (ref 82–98)
PLATELET # BLD AUTO: 306 THOUSANDS/UL (ref 149–390)
PMV BLD AUTO: 9 FL (ref 8.9–12.7)
POTASSIUM SERPL-SCNC: 3.6 MMOL/L (ref 3.5–5.3)
POTASSIUM SERPL-SCNC: 3.8 MMOL/L (ref 3.5–5.3)
POTASSIUM SERPL-SCNC: 3.9 MMOL/L (ref 3.5–5.3)
POTASSIUM SERPL-SCNC: 3.9 MMOL/L (ref 3.5–5.3)
PROT SERPL-MCNC: 6 G/DL (ref 6.4–8.4)
PROT SERPL-MCNC: 6.5 G/DL (ref 6.4–8.4)
PROT SERPL-MCNC: 6.6 G/DL (ref 6.4–8.4)
PROT SERPL-MCNC: 6.7 G/DL (ref 6.4–8.4)
RBC # BLD AUTO: 4.06 MILLION/UL (ref 3.81–5.12)
SODIUM SERPL-SCNC: 138 MMOL/L (ref 135–147)
SODIUM SERPL-SCNC: 138 MMOL/L (ref 135–147)
SODIUM SERPL-SCNC: 139 MMOL/L (ref 135–147)
SODIUM SERPL-SCNC: 139 MMOL/L (ref 135–147)
WBC # BLD AUTO: 7.36 THOUSAND/UL (ref 4.31–10.16)

## 2024-01-22 PROCEDURE — 83735 ASSAY OF MAGNESIUM: CPT

## 2024-01-22 PROCEDURE — 99232 SBSQ HOSP IP/OBS MODERATE 35: CPT | Performed by: OBSTETRICS & GYNECOLOGY

## 2024-01-22 PROCEDURE — 80053 COMPREHEN METABOLIC PANEL: CPT

## 2024-01-22 PROCEDURE — 85027 COMPLETE CBC AUTOMATED: CPT

## 2024-01-22 RX ADMIN — ACETAMINOPHEN 325MG 650 MG: 325 TABLET ORAL at 02:31

## 2024-01-22 RX ADMIN — ACETAMINOPHEN 325MG 650 MG: 325 TABLET ORAL at 19:11

## 2024-01-22 RX ADMIN — ENOXAPARIN SODIUM 40 MG: 40 INJECTION SUBCUTANEOUS at 08:42

## 2024-01-22 RX ADMIN — ACETAMINOPHEN 325MG 650 MG: 325 TABLET ORAL at 13:19

## 2024-01-22 RX ADMIN — NIFEDIPINE 30 MG: 30 TABLET, FILM COATED, EXTENDED RELEASE ORAL at 08:42

## 2024-01-22 RX ADMIN — PRENATAL VIT W/ FE FUMARATE-FA TAB 27-0.8 MG 1 TABLET: 27-0.8 TAB at 08:42

## 2024-01-22 RX ADMIN — ACETAMINOPHEN 325MG 650 MG: 325 TABLET ORAL at 08:42

## 2024-01-22 NOTE — UTILIZATION REVIEW
Initial Clinical Review    Admission: Date/Time/Statement:   Admission Orders (From admission, onward)       Ordered        24 0157  Inpatient Admission  Once                          Orders Placed This Encounter   Procedures    Inpatient Admission     Standing Status:   Standing     Number of Occurrences:   1     Order Specific Question:   Level of Care     Answer:   Med Surg [16]     Order Specific Question:   Estimated length of stay     Answer:   More than 2 Midnights     Order Specific Question:   Certification     Answer:   I certify that inpatient services are medically necessary for this patient for a duration of greater than two midnights. See H&P and MD Progress Notes for additional information about the patient's course of treatment.     ED Arrival Information       Expected   2024 23:00    Arrival   2024 23:40    Acuity   Urgent              Means of arrival   Walk-In    Escorted by   Family Member    Service   OB/GYN    Admission type   Emergency              Arrival complaint   Hypertension- Postpartum             Chief Complaint   Patient presents with    Hypertension      , gestational HTN, induced 39 weeks. Reports BP at home 158/86. Reports b/l lower ext. Swelling. Patient reports h/o blood clotting disorder, d/c'd home with lovenox.        Initial Presentation: 28 y.o. female POD4 from a primary low-transverse  presents to ED as a walk-in d/t elevated Bps at home as well acute onset of b/l LE edema.   In ED BP in severe range, given 20 mg IV labetalol, + Headache.   Systolic (24hrs), Av , Min:134 , Max:175    Diastolic (24hrs), Av, Min:75, Max:99   PreE Labs: CBC wnl, CMP: Cr of 1.3  P:C ratio: 0.08    Admitted Inpatient to L&D unit with Severe Preeclampsia -- start Procardia 30 mg daily. Mag gtt.  Monitor Bps. Routine PP care. Reg diet. Lovenox sq. SCDs.     Date:    Day 2: POD#5 s/p RLTCS. She reports headache has resolved, denies blurry vision,  chest pain and RUQ.   BP's improved.  S/p mag. Continue Procardia 30 mg daily.       ED Triage Vitals   Temperature Pulse Respirations Blood Pressure SpO2   01/20/24 2344 01/20/24 2344 01/20/24 2344 01/20/24 2344 01/20/24 2344   98.1 °F (36.7 °C) 73 18 157/97 99 %      Temp Source Heart Rate Source Patient Position - Orthostatic VS BP Location FiO2 (%)   01/20/24 2344 01/20/24 2344 01/20/24 2344 01/20/24 2344 --   Oral Monitor Sitting Right arm       Pain Score       01/21/24 0207       3          Wt Readings from Last 1 Encounters:   01/20/24 95.2 kg (209 lb 14.1 oz)     Additional Vital Signs:   Date/Time Temp Pulse Resp BP MAP (mmHg) SpO2 O2 Device Cardiac (WDL) Patient Position - Orthostatic VS   01/22/24 0700 -- 71 16 132/76 -- -- -- -- Lying   01/22/24 0314 -- 68 16 130/77 -- -- -- -- Lying   01/21/24 2215 -- -- -- 140/86 -- -- -- -- --   01/21/24 2208 -- -- -- 140/86 -- -- -- -- --   01/21/24 2115 -- -- -- 151/95 -- -- -- -- --   01/21/24 2030 -- -- -- 141/96 -- -- -- -- --   01/21/24 1807 -- 68 -- 136/75 -- 100 % -- -- --   01/21/24 1806 -- 68 -- -- -- -- -- -- --   01/21/24 1538 -- 72 -- 133/83 -- -- -- -- --   01/21/24 1537 -- 75 -- -- -- 99 % -- -- --   01/21/24 1400 -- -- 17 138/89 -- 100 % None (Room air) -- --   01/21/24 1347 -- 68 -- -- -- -- -- -- --   01/21/24 1155 -- 73 -- -- -- 100 % None (Room air) -- --   01/21/24 1154 -- 73 16 138/87 -- -- None (Room air) WDL Lying   01/21/24 0958 -- 65 -- 133/82 -- 100 % -- -- --   01/21/24 0801 -- 70 -- -- -- -- -- -- --   01/21/24 0800 -- 68 -- 125/88 -- 99 % -- -- --   01/21/24 0548 -- 67 -- 138/77 -- 100 % -- -- --   01/21/24 0355 -- 63 -- 138/79 -- 100 % None (Room air) -- --   01/21/24 0345 -- -- -- 129/75 -- -- -- -- --   01/21/24 0326 -- 66 -- 130/75 -- -- -- -- --   01/21/24 0309 -- 59 -- 125/77 -- -- -- -- --   01/21/24 0300 -- -- -- 122/75 -- -- -- -- --   01/21/24 0239 -- 65 -- 139/91 -- -- -- -- --   01/21/24 0209 -- 67 -- 126/77 -- -- -- -- --    01/21/24 0200 -- -- -- -- -- 99 % None (Room air) WDL --   01/21/24 0152 -- 60 -- 134/75 -- -- -- -- --   01/21/24 0113 98.1 °F (36.7 °C) 56 18 142/83 -- -- -- -- Lying   01/21/24 0031 -- -- -- 150/84 -- -- -- -- Sitting   01/21/24 0015 -- -- -- 175/91 Abnormal  126 -- -- -- Sitting   01/21/24 0000 -- -- -- 169/99 129 -- -- -- Lying   01/20/24 2344 98.1 °F (36.7 °C) 73 18 157/97 -- 99 % None (Room air) -- Sitting     Pertinent Labs/Diagnostic Test Results:   Results from last 7 days   Lab Units 01/22/24  0035 01/21/24 1712 01/21/24  1158 01/21/24  0550 01/21/24  0027 01/17/24  1325   WBC Thousand/uL 7.36 6.16 6.82 6.91 7.28 12.04*   HEMOGLOBIN g/dL 12.1 12.6 12.6 11.5 11.7 12.4   HEMATOCRIT % 36.2 38.4 37.8 33.6* 35.7 36.5   PLATELETS Thousands/uL 306 307 303 277 266 203   NEUTROS ABS Thousands/µL  --   --   --   --  4.95 9.61*     Results from last 7 days   Lab Units 01/22/24  0533 01/22/24  0035 01/21/24 1712 01/21/24  1158 01/21/24  0550 01/21/24  0027   SODIUM mmol/L 139 138 137 137 138 140   POTASSIUM mmol/L 3.8 3.6 3.6 3.8 3.9 4.0   CHLORIDE mmol/L 105 104 102 103 107 106   CO2 mmol/L 26 25 26 27 25 27   ANION GAP mmol/L 8 9 9 7 6 7   BUN mg/dL 18 16 16 13 16 17   CREATININE mg/dL 1.10 1.19 1.07 1.04 1.01 1.31*   EGFR ml/min/1.73sq m 68 62 71 73 76 55   CALCIUM mg/dL 7.3* 7.1* 7.6* 8.1* 8.2* 8.7   MAGNESIUM mg/dL  --  6.2* 6.1* 6.0* 5.2* 2.1     Results from last 7 days   Lab Units 01/22/24  0533 01/22/24  0035 01/21/24  1712 01/21/24  1158 01/21/24  0550   AST U/L 20 19 24 25 24   ALT U/L 26 25 30 31 29   ALK PHOS U/L 145* 138* 148* 146* 110*   TOTAL PROTEIN g/dL 6.6 6.0* 6.6 6.5 5.8*   ALBUMIN g/dL 3.6 3.3* 3.5 3.5 3.2*   TOTAL BILIRUBIN mg/dL 0.27 0.21 0.25 0.29 0.23     Results from last 7 days   Lab Units 01/22/24  0533 01/22/24  0035 01/21/24  1712 01/21/24  1158 01/21/24  0550 01/21/24  0027 01/16/24  1220   GLUCOSE RANDOM mg/dL 93 115 108 94 106 77 81       Results from last 7 days   Lab Units  01/21/24  0027 01/16/24  1220   CREATININE UR mg/dL 130.3 125.2   PROTEIN UR mg/dL 11 14   PROT/CREAT RATIO UR  0.08 0.11*     ED Treatment:   Medication Administration from 01/20/2024 2209 to 01/21/2024 0049       None       Past Medical History:   Diagnosis Date    ADHD     Asthma     Fibroadenoma of breast, left, right     Prothrombin gene mutation (HCC)      Present on Admission:   Preeclampsia, severe   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 female  Admission Orders:  Scheduled Medications:  enoxaparin, 40 mg, Subcutaneous, Q24H BENNETT  labetalol, 20 mg, Intravenous, Once  NIFEdipine, 30 mg, Oral, Daily  prenatal multivitamin, 1 tablet, Oral, Daily    Continuous IV Infusions:  magnesium sulfate 20 g/500 mL infusion (premix)  Rate: 50 mL/hr Dose: 2 g/hr  Freq: Continuous Route: IV  Last Dose: Stopped (01/22/24 0204)  Start: 01/21/24 0030 End: 01/22/24 0133      PRN Meds:  acetaminophen, 650 mg, Oral, Q6H PRN 1/21 x4, 1/22 x2  benzocaine-menthol-lanolin-aloe, 1 Application, Topical, 4x Daily PRN  docusate sodium, 100 mg, Oral, BID PRN  metoclopramide, 10 mg, Intravenous, Q6H PRN 1/21 x3  oxyCODONE, 5 mg, Oral, Q4H PRN 1/21 x3  witch hazel-glycerin, 1 Pad, Topical, Q4H PRN          Network Utilization Review Department  ATTENTION: Please call with any questions or concerns to [phone] and carefully listen to the prompts so that you are directed to the right person. All voicemails are confidential.   For Discharge needs, contact Care Management DC Support Team at [phone] opt. 2  Send all requests for admission clinical reviews, approved or denied determinations and any other requests to dedicated fax number below belonging to the campus where the patient is receiving treatment. List of dedicated fax numbers for the Facilities:  FACILITY NAME UR FAX NUMBER    ADMISSION DENIALS (Administrative/Medical Necessity) [phone]   DISCHARGE SUPPORT TEAM (NETWORK) [phone]   PARENT CHILD HEALTH (Maternity/NICU/Pediatrics) [phone]   [facility] [phone]   [facility] [phone]   [facility] [phone]   [facility] [phone]   [facility] [phone]   [facility] [phone]   [facility] [phone]   [facility] [phone]   [facility] [phone]   [facility] [phone]   [facility] [phone]

## 2024-01-22 NOTE — ASSESSMENT & PLAN NOTE
Creatinine   Date Value Ref Range Status   01/22/2024 0.92 0.60 - 1.30 mg/dL Final     Comment:     Standardized to IDMS reference method   01/22/2024 0.97 0.60 - 1.30 mg/dL Final     Comment:     Standardized to IDMS reference method   01/22/2024 1.10 0.60 - 1.30 mg/dL Final     Comment:     Standardized to IDMS reference method     F/U AM CMP

## 2024-01-22 NOTE — PROGRESS NOTES
Progress Note - OB/GYN  Maxwell Crabtree 28 y.o. female MRN: [id number]  Unit/Bed#: L&D 327-01 Encounter: [id number]    Assessment and Plan     Maxwell Crabtree is a patient of: [facility]. She is PPD# 5 s/p RLTCS and was readmitted due to preeclampsia with severe features . She is stable       Status post primary low transverse  section  Assessment & Plan  Continue routine postoperative/postpartum care    * Preeclampsia, severe  Assessment & Plan  Patient met criteria for gestational hypertension intrapartum, preeclampsia labs were normal at that time and blood pressures did not require treatment  Patient now meets criteria for severe features secondary to sustained severe range blood pressures in the emergency room as well as a creatinine of 1.3  Systolic (24hrs), Av , Min:125 , Max:151      Diastolic (24hrs), Av, Min:75, Max:96    PreE Labs: CBC wnl, CMP: Cr of 1.3  P:C ratio: 0.08    : S/p Magnesium for seizure ppx, Procardia 30mg daily      Acute kidney injury (HCC)  Assessment & Plan  Creatinine   Date Value Ref Range Status   2024 1.10 0.60 - 1.30 mg/dL Final     Comment:     Standardized to IDMS reference method   2024 1.19 0.60 - 1.30 mg/dL Final     Comment:     Standardized to IDMS reference method   2024 1.07 0.60 - 1.30 mg/dL Final     Comment:     Standardized to IDMS reference method         Heterozygous for prothrombin [id number] mutation (HCC)  Assessment & Plan  Continue home Lovenox 40mg daily         Disposition    - Anticipate discharge home on POD# 6      Subjective/Objective     Chief Complaint: Postpartum State     Subjective:    Maxwell Crabtree is PPD/POD#5 s/p RLTCS. She reports headache has resolved, she denies blurry vision, chest pain and RUQ.   Pain is well controlled.  Patient is currently voiding.  She is ambulating.  Patient is currently passing flatus and has had bowel movement. She is tolerating PO, and denies nausea or vomitting.  Patient denies fever, chills, chest pain, shortness of breath, or calf tenderness. Lochia is normal.  She is recovering well and is stable.       Vitals:   /76 (BP Location: Left arm)   Pulse 71   Temp 98.1 °F (36.7 °C) (Axillary)   Resp 16   Wt 95.2 kg (209 lb 14.1 oz)   LMP 04/16/2023 (Exact Date)   SpO2 100%   Breastfeeding Yes   BMI 33.88 kg/m²       Intake/Output Summary (Last 24 hours) at 1/22/2024 0723  Last data filed at 1/22/2024 0501  Gross per 24 hour   Intake 1875 ml   Output 8850 ml   Net -6975 ml       Invasive Devices       Peripheral Intravenous Line  Duration             Peripheral IV 01/21/24 Dorsal (posterior);Right Hand 1 day                    Physical Exam:   GEN: Maxwell Crabtree appears well, alert and oriented x 3, pleasant and cooperative   CARDIO: RRR, no murmurs or rubs  RESP:  CTAB, no wheezes or rales  ABDOMEN: soft, no tenderness, no distention, fundus @ 2 cm below u , Incision C/D/I  EXTREMITIES: SCDs on, non tender, no erythema      Labs:     Hemoglobin   Date Value Ref Range Status   01/22/2024 12.1 11.5 - 15.4 g/dL Final   01/21/2024 12.6 11.5 - 15.4 g/dL Final     WBC   Date Value Ref Range Status   01/22/2024 7.36 4.31 - 10.16 Thousand/uL Final   01/21/2024 6.16 4.31 - 10.16 Thousand/uL Final     Platelets   Date Value Ref Range Status   01/22/2024 306 149 - 390 Thousands/uL Final   01/21/2024 307 149 - 390 Thousands/uL Final     Creatinine   Date Value Ref Range Status   01/22/2024 1.10 0.60 - 1.30 mg/dL Final     Comment:     Standardized to IDMS reference method   01/22/2024 1.19 0.60 - 1.30 mg/dL Final     Comment:     Standardized to IDMS reference method     AST   Date Value Ref Range Status   01/22/2024 20 13 - 39 U/L Final   01/22/2024 19 13 - 39 U/L Final     ALT   Date Value Ref Range Status   01/22/2024 26 7 - 52 U/L Final     Comment:     Specimen collection should occur prior to Sulfasalazine administration due to the potential for falsely depressed  results.    01/22/2024 25 7 - 52 U/L Final     Comment:     Specimen collection should occur prior to Sulfasalazine administration due to the potential for falsely depressed results.           Gilma Allen MD  1/22/2024  7:23 AM

## 2024-01-23 VITALS
RESPIRATION RATE: 18 BRPM | HEART RATE: 61 BPM | SYSTOLIC BLOOD PRESSURE: 146 MMHG | BODY MASS INDEX: 33.88 KG/M2 | DIASTOLIC BLOOD PRESSURE: 86 MMHG | TEMPERATURE: 97.6 F | OXYGEN SATURATION: 100 % | WEIGHT: 209.88 LBS

## 2024-01-23 LAB
ALBUMIN SERPL BCP-MCNC: 3.5 G/DL (ref 3.5–5)
ALP SERPL-CCNC: 131 U/L (ref 34–104)
ALT SERPL W P-5'-P-CCNC: 21 U/L (ref 7–52)
ANION GAP SERPL CALCULATED.3IONS-SCNC: 5 MMOL/L
AST SERPL W P-5'-P-CCNC: 19 U/L (ref 13–39)
BILIRUB SERPL-MCNC: 0.36 MG/DL (ref 0.2–1)
BUN SERPL-MCNC: 23 MG/DL (ref 5–25)
CALCIUM SERPL-MCNC: 8.6 MG/DL (ref 8.4–10.2)
CHLORIDE SERPL-SCNC: 108 MMOL/L (ref 96–108)
CO2 SERPL-SCNC: 24 MMOL/L (ref 21–32)
CREAT SERPL-MCNC: 0.91 MG/DL (ref 0.6–1.3)
GFR SERPL CREATININE-BSD FRML MDRD: 86 ML/MIN/1.73SQ M
GLUCOSE SERPL-MCNC: 76 MG/DL (ref 65–140)
POTASSIUM SERPL-SCNC: 4.7 MMOL/L (ref 3.5–5.3)
PROT SERPL-MCNC: 6.5 G/DL (ref 6.4–8.4)
SODIUM SERPL-SCNC: 137 MMOL/L (ref 135–147)

## 2024-01-23 PROCEDURE — 99231 SBSQ HOSP IP/OBS SF/LOW 25: CPT | Performed by: OBSTETRICS & GYNECOLOGY

## 2024-01-23 PROCEDURE — G0463 HOSPITAL OUTPT CLINIC VISIT: HCPCS

## 2024-01-23 PROCEDURE — 80053 COMPREHEN METABOLIC PANEL: CPT

## 2024-01-23 PROCEDURE — 99215 OFFICE O/P EST HI 40 MIN: CPT

## 2024-01-23 PROCEDURE — NC001 PR NO CHARGE: Performed by: OBSTETRICS & GYNECOLOGY

## 2024-01-23 RX ORDER — NIFEDIPINE 30 MG/1
30 TABLET, EXTENDED RELEASE ORAL DAILY
Qty: 30 TABLET | Refills: 3 | Status: SHIPPED | OUTPATIENT
Start: 2024-01-24

## 2024-01-23 RX ADMIN — ACETAMINOPHEN 325MG 650 MG: 325 TABLET ORAL at 11:55

## 2024-01-23 RX ADMIN — ENOXAPARIN SODIUM 40 MG: 40 INJECTION SUBCUTANEOUS at 08:18

## 2024-01-23 RX ADMIN — PRENATAL VIT W/ FE FUMARATE-FA TAB 27-0.8 MG 1 TABLET: 27-0.8 TAB at 08:18

## 2024-01-23 RX ADMIN — NIFEDIPINE 30 MG: 30 TABLET, FILM COATED, EXTENDED RELEASE ORAL at 08:19

## 2024-01-23 RX ADMIN — ACETAMINOPHEN 325MG 650 MG: 325 TABLET ORAL at 01:05

## 2024-01-23 NOTE — DISCHARGE SUMMARY
Obstetrics Discharge Summary   Maxwell Crabtree 28 y.o. female MRN: [id number]  Unit/Bed#: L&D 327-01 Encounter: [id number]    Admission Date: 2024     Discharge Date: 2024    Admitting Diagnoses:   Pre-Eclampsia With Severe Features   Status post Low transverse  section   Heterozygous for Prothrombin [id number] mutation  Acute Kidney Injury     Discharge Diagnoses:   Same, delivered      Procedures:   None     Admitting Attending: Dr. Santiago     Discharge Attending: Dr. Javier     [facility] Course:   Maxwell Crabtere is now a 28 y.o.  was readmitted at postop day 4 from a primary low transverse  section due to severe range blood pressures meeting criteria for preeclampsia with severe features. She initially met criteria for preeclampsia without severe features during her admission for delivery but did not have severe features at that time. During this admission she received Magnesium infusion for seizure prophylaxis and was started on Procardia 30mg Xl daily for blood pressure control. She was also noted to have an elevated creatinine of 1.07 and was diagnosed with Acute Kidney Injury. During infusion of Magnesium she reported headache and received headache cocktail of Tylenol, Reglan and Caffeine. She received daily Lovenox 40mg for heterozygous for prothrombin G5824L mutation status.     During the remainder of her admission she did not have sustained severe range blood pressures that required acute IV treatment and her headache had resolved.     On day of discharge, she was ambulating and able to reasonably perform all ADLs. She was voiding and had appropriate bowel function. Pain was well controlled. She was discharged home on post-operative day #6 without complications. Patient was instructed to follow up with her OB as an outpatient and was given appropriate warnings to call provider if she develops more signs of severe preeclampsia, severe range blood pressures, infection or  uncontrolled pain.     Complications:   None    Condition at discharge:   good     Provisions for Follow-Up Care:  See after visit summary for information related to follow-up care and any pertinent home health orders.      Disposition:   See After Visit Summary for discharge disposition information.    Planned Readmission:   No    Discharge Medications:   Please see AVS for a complete list of discharge medications.    Discharge instructions :   Please see AVS for complete discharge instructions.    Gilma Allen MD  OBGYN PGY-2

## 2024-01-23 NOTE — PROGRESS NOTES
Progress Note - OB/GYN  Maxwell Crabtree 28 y.o. female MRN: [id number]  Unit/Bed#: L&D 327-01 Encounter: [id number]    Assessment and Plan     Maxwell Crabtree is a patient of: [facility]. She is PPD# 6 s/p RLTCS and was readmitted due to preeclampsia with severe features . She is stable       Status post primary low transverse  section  Assessment & Plan  Continue routine postoperative/postpartum care    * Preeclampsia, severe  Assessment & Plan  Patient met criteria for gestational hypertension intrapartum, preeclampsia labs were normal at that time and blood pressures did not require treatment  She was readmitted after meeting criteria for severe features secondary to sustained severe range blood pressures in the emergency room as well as a creatinine of 1.3  Systolic (24hrs), Av , Min:125 , Max:151      Diastolic (24hrs), Av, Min:75, Max:96    PreE Labs: CBC wnl, CMP: Cr of 1.3  P:C ratio: 0.08    : S/p Magnesium for seizure ppx, Procardia 30mg daily  : unsustained SRBp   : BP well controlled on procardia 30mg daily      Acute kidney injury (HCC)  Assessment & Plan  Creatinine   Date Value Ref Range Status   2024 0.92 0.60 - 1.30 mg/dL Final     Comment:     Standardized to IDMS reference method   2024 0.97 0.60 - 1.30 mg/dL Final     Comment:     Standardized to IDMS reference method   2024 1.10 0.60 - 1.30 mg/dL Final     Comment:     Standardized to IDMS reference method     F/U AM CMP    Heterozygous for prothrombin [id number] mutation ([facility])  Assessment & Plan  Continue home Lovenox 40mg daily         Disposition    - Anticipate discharge home on POD# 6      Subjective/Objective     Chief Complaint: Postpartum State     Subjective:    Maxwell Crabtree is POD#6 s/p RLTCS. She reports headache has resolved, she denies blurry vision, chest pain and RUQ.   Pain is well controlled.  Patient is currently voiding.  She is ambulating.  Patient is currently  passing flatus and has had bowel movement. She is tolerating PO, and denies nausea or vomitting. Patient denies fever, chills, chest pain, shortness of breath, or calf tenderness. Lochia is normal.  She is recovering well and is stable.       Vitals:   /81   Pulse 69   Temp 98.3 °F (36.8 °C) (Oral)   Resp 18   Wt 95.2 kg (209 lb 14.1 oz)   LMP 04/16/2023 (Exact Date)   SpO2 100%   Breastfeeding Yes   BMI 33.88 kg/m²       Intake/Output Summary (Last 24 hours) at 1/23/2024 0749  Last data filed at 1/22/2024 1001  Gross per 24 hour   Intake --   Output 300 ml   Net -300 ml       Invasive Devices       Peripheral Intravenous Line  Duration             Peripheral IV 01/21/24 Dorsal (posterior);Right Hand 2 days                    Physical Exam:   GEN: Maxwell Crabtree appears well, alert and oriented x 3, pleasant and cooperative   CARDIO: RRR, no murmurs or rubs  RESP:  CTAB, no wheezes or rales  ABDOMEN: soft, no tenderness, no distention, fundus @ 2 cm below u , Incision C/D/I  EXTREMITIES: SCDs on, non tender, no erythema      Labs:     Hemoglobin   Date Value Ref Range Status   01/22/2024 12.1 11.5 - 15.4 g/dL Final   01/21/2024 12.6 11.5 - 15.4 g/dL Final     WBC   Date Value Ref Range Status   01/22/2024 7.36 4.31 - 10.16 Thousand/uL Final   01/21/2024 6.16 4.31 - 10.16 Thousand/uL Final     Platelets   Date Value Ref Range Status   01/22/2024 306 149 - 390 Thousands/uL Final   01/21/2024 307 149 - 390 Thousands/uL Final     Creatinine   Date Value Ref Range Status   01/22/2024 0.92 0.60 - 1.30 mg/dL Final     Comment:     Standardized to IDMS reference method   01/22/2024 0.97 0.60 - 1.30 mg/dL Final     Comment:     Standardized to IDMS reference method     AST   Date Value Ref Range Status   01/22/2024 20 13 - 39 U/L Final   01/22/2024 19 13 - 39 U/L Final     ALT   Date Value Ref Range Status   01/22/2024 25 7 - 52 U/L Final     Comment:     Specimen collection should occur prior to Sulfasalazine  administration due to the potential for falsely depressed results.    01/22/2024 25 7 - 52 U/L Final     Comment:     Specimen collection should occur prior to Sulfasalazine administration due to the potential for falsely depressed results.           Gilma Allen MD  1/23/2024  7:49 AM

## 2024-01-25 ENCOUNTER — ROUTINE PRENATAL (OUTPATIENT)
Dept: OBGYN CLINIC | Facility: MEDICAL CENTER | Age: 29
End: 2024-01-25

## 2024-01-25 VITALS
DIASTOLIC BLOOD PRESSURE: 75 MMHG | WEIGHT: 189 LBS | SYSTOLIC BLOOD PRESSURE: 122 MMHG | HEIGHT: 66 IN | BODY MASS INDEX: 30.37 KG/M2

## 2024-01-25 DIAGNOSIS — Z98.891 STATUS POST PRIMARY LOW TRANSVERSE CESAREAN SECTION: ICD-10-CM

## 2024-01-25 LAB — PLACENTA IN STORAGE: NORMAL

## 2024-01-25 PROCEDURE — 99024 POSTOP FOLLOW-UP VISIT: CPT | Performed by: NURSE PRACTITIONER

## 2024-01-25 RX ORDER — BETAMETHASONE ACETATE, MICRO 100 %
POWDER (GRAM) MISCELLANEOUS
COMMUNITY
Start: 2024-01-19

## 2024-01-25 NOTE — PROGRESS NOTES
Assessment/Plan:      Diagnoses and all orders for this visit:    Preeclampsia in postpartum period    Status post primary low transverse  section    Other orders  -     Betamethasone Acetate POWD      -Encouraged patient to take Procardia daily.  Written information provided   -Encouraged to continue blood pressures twice daily.  Patient will submit blood pressures by [employer] or [employer] text weekly.  -If blood pressures are normal will plan to stop 1 week prior to 6-week postpartum exam  -Can take ibuprofen/Tylenol as needed for pain  -Can increase ADLs as tolerated  -Aware of support and educational opportunities available at [facility]  -Signs and symptoms to report reviewed    RTO 6 week pp exam or sooner as needed       Subjective:     Patient ID: Maxwell Crabtree is a 28 y.o. female.    HPI  here postop day #8 for incision and blood pressure check.  Primary  section on 24 @ 39.3 gestational weeks.  Male infant weighing 8 pounds 8.5 ounces with Apgars of 8/9.  Pregnancy was complicated by gestational hypertension, prothrombin gene mutation.   section for persistent category 2 tracing.  Patient was readmitted 24 with postpartum preeclampsia with severe features.  After noticing significant swelling and elevated blood pressure.  Was given magnesium and discharged with Procardia on 24.  Denies side effects.  Tolerating Procardia well.  Since that time denies headaches, visual changes, swelling, shortness of breath, chest pain.  Blood pressures have been 120-130/80s.  Minimal vaginal bleeding.  Breast-feeding well.  Denies signs or symptoms of depression.  Denies complications with bowel or bladder.    Last Pap smear 11/10/21 NILM    Review of Systems   Constitutional:  Negative for chills, fatigue and fever.   Respiratory: Negative.     Cardiovascular: Negative.    Genitourinary: Negative.    Neurological:  Negative for light-headedness and headaches.      "    Objective:  /75   Ht 5' 6\" (1.676 m)   Wt 85.7 kg (189 lb)   LMP 04/16/2023 (Exact Date)   Breastfeeding Yes   BMI 30.51 kg/m²      Physical Exam  Vitals reviewed.   Constitutional:       Appearance: Normal appearance.   Abdominal:      Comments: Incision clean, dry and intact.  Well-approximated.  Steri-Strips removed   Neurological:      Mental Status: She is alert and oriented to person, place, and time.   Psychiatric:         Mood and Affect: Mood normal.         Behavior: Behavior normal.           "

## 2024-02-20 ENCOUNTER — APPOINTMENT (OUTPATIENT)
Dept: LAB | Facility: CLINIC | Age: 29
End: 2024-02-20

## 2024-02-20 ENCOUNTER — OCCMED (OUTPATIENT)
Dept: URGENT CARE | Facility: CLINIC | Age: 29
End: 2024-02-20

## 2024-02-20 ENCOUNTER — TELEPHONE (OUTPATIENT)
Age: 29
End: 2024-02-20

## 2024-02-20 DIAGNOSIS — Z02.1 PRE-EMPLOYMENT EXAMINATION: ICD-10-CM

## 2024-02-20 DIAGNOSIS — Z02.1 PHYSICAL EXAM, PRE-EMPLOYMENT: Primary | ICD-10-CM

## 2024-02-20 LAB
MEV IGG SER QL IA: ABNORMAL
MUV IGG SER QL IA: NORMAL
RUBV IGG SERPL IA-ACNC: 28.8 IU/ML
VZV IGG SER QL IA: NORMAL

## 2024-02-20 PROCEDURE — 86735 MUMPS ANTIBODY: CPT

## 2024-02-20 PROCEDURE — 86787 VARICELLA-ZOSTER ANTIBODY: CPT

## 2024-02-20 PROCEDURE — 86480 TB TEST CELL IMMUN MEASURE: CPT

## 2024-02-20 PROCEDURE — 36415 COLL VENOUS BLD VENIPUNCTURE: CPT

## 2024-02-20 PROCEDURE — 86765 RUBEOLA ANTIBODY: CPT

## 2024-02-20 PROCEDURE — 86762 RUBELLA ANTIBODY: CPT

## 2024-02-20 NOTE — TELEPHONE ENCOUNTER
Patient called, she had a  on . 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 This would be 7 weeks PP. She has an apt on Monday however she needs a return to work note stating she can return to work on 3/4 and if she has any lifting restrictions. She states she is feeling well. If you can please put a note in her MyChart. Please verify if she needs restrictions or if she can return to work without restrictions. She also will be dropping by the office today with other paperwork that needs to be filled out. She is aware to look in her MyChart later today, you do not need to call her back

## 2024-02-21 LAB
GAMMA INTERFERON BACKGROUND BLD IA-ACNC: 0.06 IU/ML
M TB IFN-G BLD-IMP: NEGATIVE
M TB IFN-G CD4+ BCKGRND COR BLD-ACNC: 0 IU/ML
M TB IFN-G CD4+ BCKGRND COR BLD-ACNC: 0.01 IU/ML
MITOGEN IGNF BCKGRD COR BLD-ACNC: 9.94 IU/ML

## 2024-02-25 NOTE — PROGRESS NOTES
Subjective     Maxwell Crabtree is a 28 y.o. y.o. female  who presents for a postpartum visit. She is 6 weeks postpartum following a low cervical transverse  section on 24.  Pregnancy complicated by gHTN, prothrombin gene mutation.  for persistent category 2  The delivery was at 39.3 gestational weeks.  Labor and delivery was complicated by persistent category 2 FHT.  Male infant  weighing 8lbs 8.5 oz with Apgars of 8/9. Postpartum course has been complicated by readmission for preeclampsia. Was given magnesium and discharged on procardia 30mg. Has been off procardia for about 3 weeks. BPs at home 120s/ 70-80s.  On Lovenox for 6 weeks postpartum due to prothrombin gene mutation.  Baby's course has been uncomplicated. Baby is feeding by breast. Bleeding no bleeding. Bowel function is normal. Bladder function is normal. Patient is sexually active. Contraception method is condoms. Postpartum depression screening: negative. Has returned to work.     The following portions of the patient's history were reviewed and updated as appropriate: allergies, current medications, past medical history, past social history, past surgical history, and problem list.    Review of Systems  Pertinent items are noted in HPI..    Objective     /72  Wt 187lb    General:   appears stated age, cooperative, alert normal mood and affect   Neck: Neck: normal, supple,trachea midline, no masses   Heart: regular rate and rhythm, S1, S2 normal, no murmur, click, rub or gallop   Lungs: clear to auscultation bilaterally   Incision:  Clean, dry and intact well-approximated     Assessment/Plan     6 week postpartum exam.   Pap smear 11/10/21 NILM    1. Contraception: condoms.  Written information provided about ParaGard IUD  2.Continue Lovenox for 6 weeks postpartum  3.Patient verbalizes understanding of support and educational opportunities available at [facility].  Written information provided. Reviewed  option for PP pelvic floor PT.   4. Signs and symptoms to report reviewed    Follow up in:  5-6  months for annual exam  or as needed.

## 2024-02-26 ENCOUNTER — OFFICE VISIT (OUTPATIENT)
Dept: OBGYN CLINIC | Facility: MEDICAL CENTER | Age: 29
End: 2024-02-26

## 2024-02-26 VITALS
DIASTOLIC BLOOD PRESSURE: 72 MMHG | BODY MASS INDEX: 30.13 KG/M2 | WEIGHT: 187.5 LBS | SYSTOLIC BLOOD PRESSURE: 128 MMHG | HEIGHT: 66 IN

## 2024-02-26 DIAGNOSIS — D68.52 PROTHROMBIN GENE MUTATION (HCC): ICD-10-CM

## 2024-02-26 PROBLEM — N17.9 ACUTE KIDNEY INJURY (HCC): Status: RESOLVED | Noted: 2024-01-22 | Resolved: 2024-02-26

## 2024-02-26 PROBLEM — O99.113 THROMBOPHILIA COMPLICATING PREGNANCY IN THIRD TRIMESTER, ANTEPARTUM (HCC): Status: RESOLVED | Noted: 2023-09-07 | Resolved: 2024-02-26

## 2024-02-26 PROBLEM — Z34.93 THIRD TRIMESTER PREGNANCY: Status: RESOLVED | Noted: 2023-11-16 | Resolved: 2024-02-26

## 2024-02-26 PROBLEM — O14.10 PREECLAMPSIA, SEVERE: Status: RESOLVED | Noted: 2024-01-05 | Resolved: 2024-02-26

## 2024-02-26 PROBLEM — Z34.90 ENCOUNTER FOR INDUCTION OF LABOR: Status: RESOLVED | Noted: 2024-01-16 | Resolved: 2024-02-26

## 2024-02-26 PROBLEM — O16.9 HYPERTENSION AFFECTING PREGNANCY: Status: RESOLVED | Noted: 2024-01-10 | Resolved: 2024-02-26

## 2024-02-26 PROBLEM — D68.59 THROMBOPHILIA COMPLICATING PREGNANCY IN THIRD TRIMESTER, ANTEPARTUM (HCC): Status: RESOLVED | Noted: 2023-09-07 | Resolved: 2024-02-26

## 2024-02-26 PROBLEM — Z3A.39 39 WEEKS GESTATION OF PREGNANCY: Status: RESOLVED | Noted: 2023-07-17 | Resolved: 2024-02-26

## 2024-02-26 PROCEDURE — 99024 POSTOP FOLLOW-UP VISIT: CPT | Performed by: NURSE PRACTITIONER

## 2024-02-26 RX ORDER — BETAMETHASONE DIPROPIONATE 0.5 MG/G
CREAM TOPICAL
COMMUNITY
Start: 2024-01-19 | End: 2024-02-26

## 2024-07-10 ENCOUNTER — OFFICE VISIT (OUTPATIENT)
Dept: FAMILY MEDICINE CLINIC | Facility: CLINIC | Age: 29
End: 2024-07-10
Payer: COMMERCIAL

## 2024-07-10 VITALS
HEART RATE: 84 BPM | OXYGEN SATURATION: 97 % | BODY MASS INDEX: 30.31 KG/M2 | DIASTOLIC BLOOD PRESSURE: 70 MMHG | TEMPERATURE: 97.2 F | SYSTOLIC BLOOD PRESSURE: 122 MMHG | WEIGHT: 188.6 LBS | HEIGHT: 66 IN

## 2024-07-10 DIAGNOSIS — L23.7 POISON IVY: Primary | ICD-10-CM

## 2024-07-10 DIAGNOSIS — B07.0 PLANTAR WARTS: ICD-10-CM

## 2024-07-10 PROCEDURE — 99213 OFFICE O/P EST LOW 20 MIN: CPT | Performed by: FAMILY MEDICINE

## 2024-07-10 RX ORDER — PREDNISONE 20 MG/1
40 TABLET ORAL DAILY
Qty: 10 TABLET | Refills: 0 | Status: SHIPPED | OUTPATIENT
Start: 2024-07-10 | End: 2024-07-15

## 2024-07-10 NOTE — PROGRESS NOTES
Ambulatory Visit  Name: Maxwell Crabtree      : 1995      MRN: [id number]  Encounter Provider: Eulalia Perez MD  Encounter Date: 7/10/2024   Encounter department: [facility]    Assessment & Plan   1. Poison ivy  Comments:  Start treatment with prednisone, may continue to use calamine lotion as well  Orders:  -     predniSONE 20 mg tablet; Take 2 tablets (40 mg total) by mouth daily for 5 days  2. Plantar warts  Comments:  Referral to podiatry placed  Orders:  -     Ambulatory Referral to Podiatry; Future       History of Present Illness   Maxwell Crabtree is a very pleasant 28 year old female who presents today with a chief complaint of poison ivy.  Rash  This is a new problem. The current episode started in the past 7 days. The problem has been gradually worsening since onset. The rash is diffuse. The rash is characterized by itchiness. She was exposed to poison ivy/oak. The rash first occurred at home. Pertinent negatives include no anorexia, congestion, cough, fatigue, fever, rhinorrhea or shortness of breath. Past treatments include anti-itch cream. The treatment provided no relief.     In addition to this she is also requesting a referral to podiatry for recurrent plantar warts      Review of Systems   Constitutional:  Negative for fatigue and fever.   HENT:  Negative for congestion and rhinorrhea.    Respiratory:  Negative for cough and shortness of breath.    Gastrointestinal:  Negative for anorexia.   Skin:  Positive for rash.     Current Outpatient Medications on File Prior to Visit   Medication Sig Dispense Refill    Prenatal Vit w/Ke-Qvjsqhfjd-LT (PNV PO) Take 1 tablet by mouth in the morning      enoxaparin (LOVENOX) 40 mg/0.4 mL Inject 0.4 mL (40 mg total) under the skin daily at bedtime 16 mL 0     No current facility-administered medications on file prior to visit.      Social History     Tobacco Use    Smoking status: Never    Smokeless tobacco: Never   Vaping Use    Vaping  "status: Never Used   Substance and Sexual Activity    Alcohol use: Not Currently     Comment: occasional    Drug use: Never    Sexual activity: Yes     Partners: Male     Birth control/protection: None     Objective     /70 (BP Location: Left arm, Patient Position: Sitting, Cuff Size: Adult)   Pulse 84   Temp (!) 97.2 °F (36.2 °C) (Temporal)   Ht 5' 6\" (1.676 m)   Wt 85.5 kg (188 lb 9.6 oz)   SpO2 97%   BMI 30.44 kg/m²     Physical Exam  Constitutional:       General: She is not in acute distress.     Appearance: She is not ill-appearing.   HENT:      Head: Normocephalic and atraumatic.   Cardiovascular:      Rate and Rhythm: Normal rate.   Pulmonary:      Effort: Pulmonary effort is normal. No respiratory distress.      Breath sounds: No wheezing.   Skin:     Findings: Rash present.      Comments: Diffuse erythematous rash noted on the arms and legs   Neurological:      Mental Status: She is alert.       Administrative Statements           "

## 2024-08-06 ENCOUNTER — OFFICE VISIT (OUTPATIENT)
Dept: PODIATRY | Facility: CLINIC | Age: 29
End: 2024-08-06
Payer: COMMERCIAL

## 2024-08-06 VITALS
BODY MASS INDEX: 30.22 KG/M2 | SYSTOLIC BLOOD PRESSURE: 110 MMHG | WEIGHT: 188 LBS | HEART RATE: 74 BPM | DIASTOLIC BLOOD PRESSURE: 69 MMHG | HEIGHT: 66 IN

## 2024-08-06 DIAGNOSIS — B07.0 PLANTAR WARTS: ICD-10-CM

## 2024-08-06 PROCEDURE — 99203 OFFICE O/P NEW LOW 30 MIN: CPT | Performed by: PODIATRIST

## 2024-08-06 NOTE — PROGRESS NOTES
"Name: Maxwell Crabtree      : 1995      MRN: [id number]  Encounter Provider: Octavoi Benz DPM  Encounter Date: 2024   Encounter department: [facility] [place]    Assessment & Plan     1. Plantar warts  Comments:  Referral to podiatry placed  Orders:  -     Ambulatory Referral to Podiatry      Discussed with patient treatment for lesions on the plantar surface of the right foot.    I did discuss with her that due to current breast-feeding I would hold off on applying this until she has completed this.    Will plan on bring her back for reevaluation in 3 to 4 months.    If no improvement with Cantharone use could consider biopsy.        Return in about 4 months (around 2024).    Subjective     Location of pain: Right heel >1 year.    Nature of pain: Not painful to walk on.   Duration: a few years  Onset: gradual increasing  Aggravating factors: nothing  Treatment so far: frozen with liquid nitrogen, acid patches, went to primary and had scraped.          Constitutional:  Negative for chills and fever.   Respiratory:  Negative for chest tightness and shortness of breath.    Gastrointestinal:  Negative for nausea and vomiting.     Current Outpatient Medications on File Prior to Visit   Medication Sig   • Prenatal Vit w/Ls-Wnkpfjhef-HU (PNV PO) Take 1 tablet by mouth in the morning   • enoxaparin (LOVENOX) 40 mg/0.4 mL Inject 0.4 mL (40 mg total) under the skin daily at bedtime       Objective     /69   Pulse 74   Ht 5' 6\" (1.676 m)   Wt 85.3 kg (188 lb)   BMI 30.34 kg/m²       Vascular: Intact pedal pulses bilateral DP and PT.  Neurological: Gross protective sensation intact bilateral  Musculoskeletal: Muscle strength bilateral intact with dorsiflexion, inversion, eversion and plantarflexion.  Dermatological: Plantar verruca with pinpoint bleeding noted on trimming today at right foot plantar heel and midfoot two lesions.        "

## 2024-08-13 NOTE — PROGRESS NOTES
Ultrasound Probe Disinfection    A transvaginal ultrasound was performed. Prior to use, disinfection was performed with High Level Disinfection Process ([employer]). Probe serial number F1: [id number]  was used.     Chaperone: ROYA Joya RDMS English

## 2024-12-10 ENCOUNTER — PATIENT MESSAGE (OUTPATIENT)
Dept: FAMILY MEDICINE CLINIC | Facility: CLINIC | Age: 29
End: 2024-12-10

## 2024-12-10 ENCOUNTER — OFFICE VISIT (OUTPATIENT)
Dept: PODIATRY | Facility: CLINIC | Age: 29
End: 2024-12-10
Payer: COMMERCIAL

## 2024-12-10 VITALS
HEIGHT: 66 IN | WEIGHT: 188.6 LBS | BODY MASS INDEX: 30.31 KG/M2 | HEART RATE: 75 BPM | SYSTOLIC BLOOD PRESSURE: 114 MMHG | DIASTOLIC BLOOD PRESSURE: 72 MMHG

## 2024-12-10 DIAGNOSIS — F90.9 ATTENTION DEFICIT HYPERACTIVITY DISORDER (ADHD), UNSPECIFIED ADHD TYPE: ICD-10-CM

## 2024-12-10 DIAGNOSIS — B07.0 PLANTAR WARTS: Primary | ICD-10-CM

## 2024-12-10 PROCEDURE — 99213 OFFICE O/P EST LOW 20 MIN: CPT | Performed by: PODIATRIST

## 2024-12-10 NOTE — PROGRESS NOTES
"  Name: Maxwell Crabtree      : 1995      MRN: [id number]  Encounter Provider: Octavio Benz DPM  Encounter Date: 12/10/2024   Encounter department: [facility] [place]    Assessment & Plan     1. Plantar warts        Return for canthrone treatment.  Pt still breast feeding.  Once stops we can use canthrone.  Applied blenderm tape and trimmed wart right foot today.      Return in about 4 weeks (around 2025).    Subjective     Patient returns for follow-up on right foot warts.  She is still breast-feeding currently.  We had held off on treatment until she was completed with this.  She states that they do appear to be improved even from the first time that we treated them with trimming.        Constitutional:  Negative for chills and fever.   Respiratory:  Negative for chest tightness and shortness of breath.    Gastrointestinal:  Negative for nausea and vomiting.     Current Outpatient Medications on File Prior to Visit   Medication Sig   • enoxaparin (LOVENOX) 40 mg/0.4 mL Inject 0.4 mL (40 mg total) under the skin daily at bedtime   • Prenatal Vit w/Lf-Rgpmunuon-WO (PNV PO) Take 1 tablet by mouth in the morning (Patient not taking: Reported on 12/10/2024)       Objective     /72   Pulse 75   Ht 5' 6\" (1.676 m)   Wt 85.5 kg (188 lb 9.6 oz)   BMI 30.44 kg/m²     Right foot examination shows intact pedal pulses.  Neurologic sensation is grossly tact distally.  No significant discomfort with palpation of the right foot.  2 areas of hyperkeratotic tissue formation noted on the plantar foot.  There is some pinpoint bleeding noted on trimming to this area consistent with verruca.      "

## 2024-12-11 RX ORDER — DEXTROAMPHETAMINE SACCHARATE, AMPHETAMINE ASPARTATE MONOHYDRATE, DEXTROAMPHETAMINE SULFATE AND AMPHETAMINE SULFATE 5; 5; 5; 5 MG/1; MG/1; MG/1; MG/1
20 CAPSULE, EXTENDED RELEASE ORAL EVERY MORNING
Qty: 30 CAPSULE | Refills: 0 | Status: SHIPPED | OUTPATIENT
Start: 2024-12-11

## 2025-01-19 DIAGNOSIS — F90.9 ATTENTION DEFICIT HYPERACTIVITY DISORDER (ADHD), UNSPECIFIED ADHD TYPE: ICD-10-CM

## 2025-01-20 RX ORDER — DEXTROAMPHETAMINE SACCHARATE, AMPHETAMINE ASPARTATE MONOHYDRATE, DEXTROAMPHETAMINE SULFATE AND AMPHETAMINE SULFATE 5; 5; 5; 5 MG/1; MG/1; MG/1; MG/1
20 CAPSULE, EXTENDED RELEASE ORAL EVERY MORNING
Qty: 30 CAPSULE | Refills: 0 | Status: SHIPPED | OUTPATIENT
Start: 2025-01-20

## 2025-01-23 ENCOUNTER — HOSPITAL ENCOUNTER (EMERGENCY)
Facility: HOSPITAL | Age: 30
Discharge: HOME | End: 2025-01-23
Attending: EMERGENCY MEDICINE
Payer: COMMERCIAL

## 2025-01-23 ENCOUNTER — APPOINTMENT (EMERGENCY)
Dept: CT IMAGING | Facility: HOSPITAL | Age: 30
End: 2025-01-23
Payer: COMMERCIAL

## 2025-01-23 VITALS
OXYGEN SATURATION: 100 % | RESPIRATION RATE: 18 BRPM | SYSTOLIC BLOOD PRESSURE: 119 MMHG | DIASTOLIC BLOOD PRESSURE: 62 MMHG | HEART RATE: 88 BPM | TEMPERATURE: 98.7 F

## 2025-01-23 DIAGNOSIS — R42 LIGHTHEADEDNESS: ICD-10-CM

## 2025-01-23 DIAGNOSIS — R00.2 PALPITATIONS: Primary | ICD-10-CM

## 2025-01-23 LAB
ANION GAP SERPL CALCULATED.3IONS-SCNC: 8 MMOL/L (ref 4–13)
ATRIAL RATE: 124 BPM
BACTERIA UR QL AUTO: ABNORMAL /HPF
BASOPHILS # BLD AUTO: 0.03 THOUSANDS/ΜL (ref 0–0.1)
BASOPHILS NFR BLD AUTO: 0 % (ref 0–1)
BILIRUB UR QL STRIP: NEGATIVE
BUN SERPL-MCNC: 22 MG/DL (ref 5–25)
CALCIUM SERPL-MCNC: 9.1 MG/DL (ref 8.4–10.2)
CHLORIDE SERPL-SCNC: 104 MMOL/L (ref 96–108)
CLARITY UR: CLEAR
CO2 SERPL-SCNC: 24 MMOL/L (ref 21–32)
COLOR UR: YELLOW
CREAT SERPL-MCNC: 0.87 MG/DL (ref 0.6–1.3)
D DIMER PPP FEU-MCNC: 0.37 UG/ML FEU
EOSINOPHIL # BLD AUTO: 0 THOUSAND/ΜL (ref 0–0.61)
EOSINOPHIL NFR BLD AUTO: 0 % (ref 0–6)
ERYTHROCYTE [DISTWIDTH] IN BLOOD BY AUTOMATED COUNT: 13.7 % (ref 11.6–15.1)
EXT PREGNANCY TEST URINE: NEGATIVE
EXT. CONTROL: NORMAL
GFR SERPL CREATININE-BSD FRML MDRD: 90 ML/MIN/1.73SQ M
GLUCOSE SERPL-MCNC: 113 MG/DL (ref 65–140)
GLUCOSE UR STRIP-MCNC: NEGATIVE MG/DL
HCT VFR BLD AUTO: 42.4 % (ref 34.8–46.1)
HGB BLD-MCNC: 13.9 G/DL (ref 11.5–15.4)
HGB UR QL STRIP.AUTO: ABNORMAL
IMM GRANULOCYTES # BLD AUTO: 0.04 THOUSAND/UL (ref 0–0.2)
IMM GRANULOCYTES NFR BLD AUTO: 0 % (ref 0–2)
KETONES UR STRIP-MCNC: NEGATIVE MG/DL
LEUKOCYTE ESTERASE UR QL STRIP: ABNORMAL
LYMPHOCYTES # BLD AUTO: 0.73 THOUSANDS/ΜL (ref 0.6–4.47)
LYMPHOCYTES NFR BLD AUTO: 6 % (ref 14–44)
MCH RBC QN AUTO: 27.9 PG (ref 26.8–34.3)
MCHC RBC AUTO-ENTMCNC: 32.8 G/DL (ref 31.4–37.4)
MCV RBC AUTO: 85 FL (ref 82–98)
MONOCYTES # BLD AUTO: 0.54 THOUSAND/ΜL (ref 0.17–1.22)
MONOCYTES NFR BLD AUTO: 5 % (ref 4–12)
MUCOUS THREADS UR QL AUTO: ABNORMAL
NEUTROPHILS # BLD AUTO: 10.31 THOUSANDS/ΜL (ref 1.85–7.62)
NEUTS SEG NFR BLD AUTO: 89 % (ref 43–75)
NITRITE UR QL STRIP: NEGATIVE
NON-SQ EPI CELLS URNS QL MICRO: ABNORMAL /HPF
NRBC BLD AUTO-RTO: 0 /100 WBCS
P AXIS: 73 DEGREES
PH UR STRIP.AUTO: 6 [PH] (ref 4.5–8)
PLATELET # BLD AUTO: 253 THOUSANDS/UL (ref 149–390)
PMV BLD AUTO: 9.8 FL (ref 8.9–12.7)
POTASSIUM SERPL-SCNC: 3.8 MMOL/L (ref 3.5–5.3)
PR INTERVAL: 138 MS
PROT UR STRIP-MCNC: NEGATIVE MG/DL
QRS AXIS: 77 DEGREES
QRSD INTERVAL: 76 MS
QT INTERVAL: 288 MS
QTC INTERVAL: 413 MS
RBC # BLD AUTO: 4.99 MILLION/UL (ref 3.81–5.12)
RBC #/AREA URNS AUTO: ABNORMAL /HPF
SODIUM SERPL-SCNC: 136 MMOL/L (ref 135–147)
SP GR UR STRIP.AUTO: 1.02 (ref 1–1.03)
T WAVE AXIS: -21 DEGREES
TSH SERPL DL<=0.05 MIU/L-ACNC: 3.04 UIU/ML (ref 0.45–4.5)
UROBILINOGEN UR QL STRIP.AUTO: 0.2 E.U./DL
VENTRICULAR RATE: 124 BPM
WBC # BLD AUTO: 11.65 THOUSAND/UL (ref 4.31–10.16)
WBC #/AREA URNS AUTO: ABNORMAL /HPF

## 2025-01-23 PROCEDURE — 84443 ASSAY THYROID STIM HORMONE: CPT | Performed by: EMERGENCY MEDICINE

## 2025-01-23 PROCEDURE — 85025 COMPLETE CBC W/AUTO DIFF WBC: CPT | Performed by: EMERGENCY MEDICINE

## 2025-01-23 PROCEDURE — 99285 EMERGENCY DEPT VISIT HI MDM: CPT

## 2025-01-23 PROCEDURE — 71275 CT ANGIOGRAPHY CHEST: CPT

## 2025-01-23 PROCEDURE — 93005 ELECTROCARDIOGRAM TRACING: CPT

## 2025-01-23 PROCEDURE — 80048 BASIC METABOLIC PNL TOTAL CA: CPT | Performed by: EMERGENCY MEDICINE

## 2025-01-23 PROCEDURE — 93010 ELECTROCARDIOGRAM REPORT: CPT | Performed by: STUDENT IN AN ORGANIZED HEALTH CARE EDUCATION/TRAINING PROGRAM

## 2025-01-23 PROCEDURE — 81001 URINALYSIS AUTO W/SCOPE: CPT

## 2025-01-23 PROCEDURE — 36415 COLL VENOUS BLD VENIPUNCTURE: CPT | Performed by: EMERGENCY MEDICINE

## 2025-01-23 PROCEDURE — 81025 URINE PREGNANCY TEST: CPT | Performed by: EMERGENCY MEDICINE

## 2025-01-23 PROCEDURE — 85379 FIBRIN DEGRADATION QUANT: CPT | Performed by: EMERGENCY MEDICINE

## 2025-01-23 PROCEDURE — 99285 EMERGENCY DEPT VISIT HI MDM: CPT | Performed by: EMERGENCY MEDICINE

## 2025-01-23 PROCEDURE — 96360 HYDRATION IV INFUSION INIT: CPT

## 2025-01-23 RX ORDER — ACETAMINOPHEN 325 MG/1
650 TABLET ORAL ONCE
Status: COMPLETED | OUTPATIENT
Start: 2025-01-23 | End: 2025-01-23

## 2025-01-23 RX ADMIN — SODIUM CHLORIDE 1000 ML: 0.9 INJECTION, SOLUTION INTRAVENOUS at 11:51

## 2025-01-23 RX ADMIN — IOHEXOL 80 ML: 350 INJECTION, SOLUTION INTRAVENOUS at 13:49

## 2025-01-23 RX ADMIN — ACETAMINOPHEN 650 MG: 325 TABLET, FILM COATED ORAL at 14:01

## 2025-01-23 NOTE — ED NOTES
Neurology Progress Note    NAME:  Eddie Pelayo   :   1946   MRN:   [id number]     Date/Time:  2018  Subjective:      Eddie Pelayo is a 71 y.o. male here today for follow for breakthrogh seizure s/p  ER visit  Patient was accompanied by his care giver to the visit. He was worked in today because of seizure activity and subsequent ER visit, a  Week after patient's last visit, he was said to have had seizure activity and was taken to ER. Apparently the adjustment of patient's medication resulted in sudden drop in the levels, patient was restarted on Keppra. No seizure since the ER visit. Seizure was on 18, this is the first seizure patient had in a long time. Will continue Keppra and Vimpat. Review of Systems - General ROS: positive for  - sleep disturbance  Psychological ROS: positive for - anxiety, memory difficulties and sleep disturbances  Ophthalmic ROS: negative  ENT ROS: negative  Allergy and Immunology ROS: negative  Hematological and Lymphatic ROS: negative  Endocrine ROS: negative  Respiratory ROS: no cough, shortness of breath, or wheezing  Cardiovascular ROS: no chest pain or dyspnea on exertion  Gastrointestinal ROS: no abdominal pain, change in bowel habits, or black or bloody stools  Genito-Urinary ROS: no dysuria, trouble voiding, or hematuria  Musculoskeletal ROS: positive for - joint stiffness and muscle pain  Neurological ROS: positive for - seizures  Dermatological ROS: negative      Medications reviewed:  Current Outpatient Prescriptions   Medication Sig Dispense Refill    DOC-Q-LACE 100 mg capsule two (2) times daily as needed.  levETIRAcetam 1,000 mg tablet Take  by mouth two (2) times a day.  ergocalciferol (VITAMIN D2) 50,000 unit capsule Take 50,000 Units by mouth every thirty (30) days.  ondansetron (ZOFRAN ODT) 8 mg disintegrating tablet Take 1 Tab by mouth every eight (8) hours as needed for Nausea.  20 Tab 2    oxyCODONE-acetaminophen (PERCOCET) 5-325 Pt reports she has prothrombin gene mutation which her mother also has. Reports her mother had a fall which resulted in her having thrombectomy- pt reports she fell on L arm a few days ago.      Zully Callahan RN  01/23/25 7429     mg per tablet 1-2 tabs every 4hrs as needed for pain. Maximum daily dose 12 tablets. 80 Tab 0    ascorbic acid, vitamin C, (VITAMIN C) 500 mg tablet Take 1 Tab by mouth daily for 42 days. 42 Tab 0    lacosamide (VIMPAT) 100 mg tab tablet Take 1 Tab by mouth two (2) times a day. Max Daily Amount: 200 mg. 60 Tab 1    metoprolol (LOPRESSOR) 25 mg tablet Take 0.5 Tabs by mouth two (2) times a day. 60 Tab 3    bumetanide (BUMEX) 2 mg tablet Take 2 mg by mouth daily.  acetaminophen (TYLENOL) 325 mg tablet Take 650 mg by mouth every eight (8) hours as needed for Pain.  potassium chloride (K-DUR, KLOR-CON) 20 mEq tablet Take 20 mEq by mouth two (2) times a day.  ammonium lactate (LAC-HYDRIN) 12 % lotion Apply  to affected area daily. rub in to affected area well      naproxen (NAPROSYN) 500 mg tablet Take 1 Tab by mouth two (2) times daily (with meals).  20 Tab 0        Objective:   Vitals:  Vitals:    01/23/18 1029   BP: 105/64   Pulse: 77   Resp: 18   Temp: 97.2 °F (36.2 °C)   TempSrc: Temporal   SpO2: 98%   Weight: 266 lb 9.6 oz (120.9 kg)   Height: 6' 3\" (1.905 m)   PainSc:   0 - No pain               Lab Data Reviewed:  Lab Results   Component Value Date/Time    WBC 5.6 10/23/2015 04:43 AM    HCT 41.2 10/23/2015 04:43 AM    HGB 13.7 10/23/2015 04:43 AM    PLATELET 722 16/51/2984 04:43 AM       Lab Results   Component Value Date/Time    Sodium 136 11/14/2015 10:58 AM    Potassium 3.3 11/14/2015 10:58 AM    Chloride 102 11/14/2015 10:58 AM    CO2 28 11/14/2015 10:58 AM    Glucose 107 11/14/2015 10:58 AM    BUN 16 11/14/2015 10:58 AM    Creatinine 0.86 11/14/2015 10:58 AM    Calcium 8.0 11/14/2015 10:58 AM       No components found for: TROPQUANT    No results found for: JOSE      No results found for: HBA1C, HGBE8, NSI4JFZT, SQA8HGMC     No results found for: B12LT, FOL, RBCF    No results found for: JOSE, ANARX, ANAIGG, XBANA    No results found for: CHOL, CHOLPOCT, CHOLX, CHLST, CHOLV, HDL, HDLPOC, LDL, LDLCPOC, LDLC, DLDLP, VLDLC, VLDL, TGLX, TRIGL, TRIGP, TGLPOCT, CHHD, CHHDX      CT Results (recent):    Results from [place] encounter on 10/17/15   CT MAXILLOFACIAL WO CONT   Narrative **Final Report**      ICD Codes / Adm. Diagnosis: 97  276.1 / Seizure  Seizure  Examination:  CT MAXILLOFACIAL WO CON  - [id number] - Oct 17 2015 10:33AM  Accession No:  [id number]  Reason:  head trauma after seizure      REPORT:  INDICATION:  head trauma after seizure    EXAM: CT MAXILLOFACIAL STRUCTURES WITHOUT CONTRAST. Comparison: CT head 10/22/2008. PROCEDURE: Sequential axial images of the maxillofacial bones were   performed, using bone algorithm. Soft tissue and bone windows were examined. Post-processing for coronal reformatting was performed. Contrast was not   administered. FINDINGS: No fracture is obvious. The orbital rims and floors of orbits are   intact. The nasal bone is intact, with a flattened configuration which is   stable since older head CTs. . The bony structures of the mandible and   maxilla show no acute abnormality. There are no air-fluid levels in the   paranasal sinuses, and no soft tissue swelling is noted focally. Minimal   mucoperiosteal thickening. No obvious mass or abscess. Normal sized lymph   nodes scattered throughout the cervical chains. IMPRESSION: No acute bony abnormality of the maxillofacial structures. Signing/Reading Doctor: Renee Carlin ([id number])    Approved: Renee Carlin ([id number])  Oct 17 2015 10:53AM                                MRI Results (recent):  No results found for this or any previous visit. IR Results (recent):  No results found for this or any previous visit. VAS/US Results (recent):  No results found for this or any previous visit. PHYSICAL EXAM:  General:    Alert, cooperative, no distress, appears stated age. Head:   Normocephalic, without obvious abnormality, atraumatic. Eyes:   Conjunctivae/corneas clear. PERRLA  Nose:  Nares normal. No drainage or sinus tenderness. Throat:    Lips, mucosa, and tongue normal.  No Thrush  Neck:  Supple, symmetrical,  no adenopathy, thyroid: non tender    no carotid bruit and no JVD. Back:    Symmetric,  No CVA tenderness. Lungs:   Clear to auscultation bilaterally. No Wheezing or Rhonchi. No rales. Chest wall:  No tenderness or deformity. No Accessory muscle use. Heart:   Regular rate and rhythm,  no murmur, rub or gallop. Abdomen:   Soft, non-tender. Not distended. Bowel sounds normal. No masses  Extremities: Extremities normal, atraumatic, No cyanosis. No edema. No clubbing  Skin:     Texture, turgor normal. No rashes or lesions. Not Jaundiced  Lymph nodes: Cervical, supraclavicular normal.  Psych:  Good insight. Not depressed. Not anxious or agitated. NEUROLOGICAL EXAM:  Appearance: The patient is well developed, well nourished, provides a coherent history and is in no acute distress. Mental Status: Oriented to time, place and person. Mood and affect appropriate. Cranial Nerves:   Intact visual fields. Fundi are benign. ANGELINA, EOM's full, no nystagmus, no ptosis. Facial sensation is normal. Corneal reflexes are intact. Facial movement is symmetric. Hearing is normal bilaterally. Palate is midline with normal sternocleidomastoid and trapezius muscles are normal. Tongue is midline. Motor:  5/5 strength in upper and lower proximal and distal muscles. Normal bulk and tone. No fasciculations. Reflexes:   Deep tendon reflexes 2+/4 and symmetrical.   Sensory:   Normal to touch, pinprick and vibration. Gait:  Normal gait. Tremor:   No tremor noted. Cerebellar:  No cerebellar signs present. Neurovascular:  Normal heart sounds and regular rhythm, peripheral pulses intact, and no carotid bruits. Assesment  1. Seizure disorder (HCC)    - lacosamide (VIMPAT) 100 mg tab tablet; Take 1 Tab by mouth two (2) times a day. Max Daily Amount: 200 mg.   Dispense: 60 Tab; Refill: 1  - levETIRAcetam 1,000 mg tablet; Take  by mouth two (2) times a day. 2. Breakthrough seizure ([address]  Medication adjustment    3. MR (mental retardation), moderate  Stable    ___________________________________________________  PLAN:Medication reviewed with with the care giver      ICD-10-CM ICD-9-CM    1. Seizure disorder ([facility] [address] G40.909 345.90 lacosamide (VIMPAT) 100 mg tab tablet      levETIRAcetam 1,000 mg tablet   2. Breakthrough seizure ([address] G40.919 345.91    3. MR (mental retardation), moderate F71 318.0      Follow-up Disposition:  Return in about 3 months (around 4/23/2018).            ___________________________________________________    Total time spent with patient:  []15   []25   []35   [] __ minutes    Care Plan discussed with:    []Patient   []Family    [x]Care Manager   []Consultant/Specialist :    ___________________________________________________    Attending Physician: Billy Ortiz MD

## 2025-01-23 NOTE — ED PROVIDER NOTES
Time reflects when diagnosis was documented in both MDM as applicable and the Disposition within this note       Time User Action Codes Description Comment    1/23/2025  2:43 PM Coby Khan Add [R00.2] Palpitations     1/23/2025  2:43 PM Coby Khan Add [R42] Lightheadedness           ED Disposition       ED Disposition   Discharge    Condition   Stable    Date/Time   Thu Jan 23, 2025  2:43 PM    Comment   Maxwell Crabtree discharge to home/self care.                   Assessment & Plan       Medical Decision Making  A 29-year-old female presents with lightheadedness, chest tightness and palpitations that began this morning.  She is well-appearing however heart rate remains 110-120's, consistent with sinus tachycardia.  Will check labs to evaluate for electrolyte abnormality, MICHAEL, anemia and thyroid function.  Will also check dimer to evaluate for possible PE, image accordingly.  Will start IV fluid and reassess.    Amount and/or Complexity of Data Reviewed  Labs: ordered. Decision-making details documented in ED Course.  Radiology: ordered. Decision-making details documented in ED Course.    Risk  OTC drugs.  Prescription drug management.        ED Course as of 01/23/25 1502   Thu Jan 23, 2025   1214 D-Dimer, Quant: 0.37  Negative    1250 POCT pregnancy, urine  Negative    1250 Remainder of labs WNL   1316 HR improved to the low 100's at rest, however with exertion increased to the 120's.  Pt also complaining of MON.  Pt's dimer is negative, although pt is more high risk given her history.  Discussed proceeding with CT PE study, which pt is in agreement with.   1417 CTA chest pe study  1.) No central pulmonary embolism.  2.) Clear lungs.  3.) Left breast mass, previously shown on ultrasound to represent a fibroadenoma.   1442 Patient feeling better.  Heart rate down to the 80s.  Updated on CT results.  Symptoms likely secondary to dehydration, encouraged increased PO hydration.  Patient in agreement.        Medications   sodium chloride 0.9 % bolus 1,000 mL (1,000 mL Intravenous New Bag 25 1151)   iohexol (OMNIPAQUE) 350 MG/ML injection (SINGLE-DOSE) 100 mL (80 mL Intravenous Given 25 1349)   acetaminophen (TYLENOL) tablet 650 mg (650 mg Oral Given 25 1401)       ED Risk Strat Scores                          SBIRT 20yo+      Flowsheet Row Most Recent Value   Initial Alcohol Screen: US AUDIT-C     1. How often do you have a drink containing alcohol? 0 Filed at: 2025 1113   2. How many drinks containing alcohol do you have on a typical day you are drinking?  0 Filed at: 2025 1113   3b. FEMALE Any Age, or MALE 65+: How often do you have 4 or more drinks on one occassion? 0 Filed at: 2025 1113   Audit-C Score 0 Filed at: 2025 1113   ALICIA: How many times in the past year have you...    Used an illegal drug or used a prescription medication for non-medical reasons? Never Filed at: 2025 1113                            History of Present Illness       Chief Complaint   Patient presents with    Rapid Heart Rate     Pt reports she has been feeling like her heart is racing this morning- had an episode like this about one week ago. Heart rate 134 in triage- denies cp. States she just feels off.        Past Medical History:   Diagnosis Date    ADHD     Asthma     Fibroadenoma of breast, left, right     Prothrombin gene mutation (HCC)       Past Surgical History:   Procedure Laterality Date    TN  DELIVERY ONLY N/A 2024    Procedure:  SECTION ();  Surgeon: Jacqui Banegas MD;  [facility];  Service: Obstetrics    WISDOM TOOTH EXTRACTION      WISDOM TOOTH EXTRACTION        Family History   Problem Relation Age of Onset    Deep vein thrombosis Mother     Other Mother         Prothrombin gene mutation    Thyroid disease Mother     No Known Problems Father     No Known Problems Maternal Grandmother     Diabetes Maternal Grandfather     Heart disease  Maternal Grandfather     Heart attack Maternal Grandfather     No Known Problems Paternal Grandmother     Heart disease Paternal Grandfather     Heart attack Paternal Grandfather     Breast cancer Other 60    Lung cancer Other     Colon cancer Neg Hx     Ovarian cancer Neg Hx       Social History     Tobacco Use    Smoking status: Never    Smokeless tobacco: Never   Vaping Use    Vaping status: Never Used   Substance Use Topics    Alcohol use: Not Currently     Comment: occasional    Drug use: Never      E-Cigarette/Vaping    E-Cigarette Use Never User       E-Cigarette/Vaping Substances    Nicotine No     THC No     CBD No     Flavoring No     Other No     Unknown No       I have reviewed and agree with the history as documented.     A 28 yo female with pmhx of prothrombin gene mutation (no prior VTE); presents with lightheadedness, chest tightness and palpitations that began upon waking this morning.  Patient states symptoms did somewhat improve after eating breakfast then recurred while at work.  She otherwise denies fever, chills, shortness of breath, abdominal pain, nausea, vomiting, diarrhea, peripheral edema and rashes.  She does report to recent dieting with food modifications, she denies supplements and weight loss medications.      History provided by:  Patient and medical records  Rapid Heart Rate      Review of Systems   Respiratory:  Positive for chest tightness.    Cardiovascular:  Positive for palpitations.   Neurological:  Positive for light-headedness.   All other systems reviewed and are negative.      Objective       ED Triage Vitals   Temperature Pulse Blood Pressure Respirations SpO2 Patient Position - Orthostatic VS   01/23/25 1110 01/23/25 1110 01/23/25 1110 01/23/25 1110 01/23/25 1110 01/23/25 1110   98.7 °F (37.1 °C) (!) 134 138/77 21 100 % Sitting      Temp Source Heart Rate Source BP Location FiO2 (%) Pain Score    01/23/25 1110 01/23/25 1110 01/23/25 1110 -- 01/23/25 1401    Oral Monitor  Right arm  6      Vitals      Date and Time Temp Pulse SpO2 Resp BP Pain Score FACES Pain Rating User   01/23/25 1430 -- 88 100 % 18 119/62 -- --    01/23/25 1401 -- -- -- -- -- 6 -- DR   01/23/25 1215 -- 110 100 % 18 128/75 -- -- SL   01/23/25 1110 98.7 °F (37.1 °C) 134 100 % 21 138/77 -- -- LB            Physical Exam  General Appearance: alert and oriented, nad, non toxic appearing  Skin:  Warm, dry, intact.  No cyanosis  HEENT: Atraumatic, normocephalic.  No eye drainage.  Normal hearing.  Moist mucous membranes.    Neck: Supple, trachea midline  Cardiac: Regular rhythm, tachycardic; no murmurs, rub, gallops.  No pedal edema, 2+ pulses  Pulmonary: lungs CTAB; no wheezes, rales, rhonchi  Gastrointestinal: abdomen soft, nontender, nondistended; no guarding or rebound tenderness; good bowel sounds, no mass or bruits  Extremities:  No deformities.  No calf tenderness, no clubbing  Neuro:  no focal motor or sensory deficits, CN 2-12 grossly intact  Psych:  Normal mood and affect, normal judgement and insight       Results Reviewed       Procedure Component Value Units Date/Time    Urine Microscopic [[id number]]  (Abnormal) Collected: 01/23/25 1242    Lab Status: Final result Specimen: Urine, Clean Catch Updated: 01/23/25 1330     RBC, UA 2-4 /hpf      WBC, UA 2-4 /hpf      Epithelial Cells Occasional /hpf      Bacteria, UA None Seen /hpf      MUCUS THREADS Occasional    POCT pregnancy, urine [[id number]]  (Normal) Collected: 01/23/25 1248    Lab Status: Final result Updated: 01/23/25 1248     EXT Preg Test, Ur Negative     Control Valid    TSH [[id number]]  (Normal) Collected: 01/23/25 1152    Lab Status: Final result Specimen: Blood from Arm, Right Updated: 01/23/25 1248     TSH 3RD GENERATON 3.043 uIU/mL     Urine Macroscopic, POC [[id number]]  (Abnormal) Collected: 01/23/25 1242    Lab Status: Final result Specimen: Urine Updated: 01/23/25 1243     Color, UA Yellow     Clarity, UA Clear     pH, UA 6.0      Leukocytes, UA Trace     Nitrite, UA Negative     Protein, UA Negative mg/dl      Glucose, UA Negative mg/dl      Ketones, UA Negative mg/dl      Urobilinogen, UA 0.2 E.U./dl      Bilirubin, UA Negative     Occult Blood, UA Trace     Specific Gravity, UA 1.020    Narrative:      CLINITEK RESULT    CBC and differential [[id number]]  (Abnormal) Collected: 01/23/25 1152    Lab Status: Final result Specimen: Blood from Arm, Right Updated: 01/23/25 1223     WBC 11.65 Thousand/uL      RBC 4.99 Million/uL      Hemoglobin 13.9 g/dL      Hematocrit 42.4 %      MCV 85 fL      MCH 27.9 pg      MCHC 32.8 g/dL      RDW 13.7 %      MPV 9.8 fL      Platelets 253 Thousands/uL      nRBC 0 /100 WBCs      Segmented % 89 %      Immature Grans % 0 %      Lymphocytes % 6 %      Monocytes % 5 %      Eosinophils Relative 0 %      Basophils Relative 0 %      Absolute Neutrophils 10.31 Thousands/µL      Absolute Immature Grans 0.04 Thousand/uL      Absolute Lymphocytes 0.73 Thousands/µL      Absolute Monocytes 0.54 Thousand/µL      Eosinophils Absolute 0.00 Thousand/µL      Basophils Absolute 0.03 Thousands/µL     Basic metabolic panel [[id number]] Collected: 01/23/25 1152    Lab Status: Final result Specimen: Blood from Arm, Right Updated: 01/23/25 1217     Sodium 136 mmol/L      Potassium 3.8 mmol/L      Chloride 104 mmol/L      CO2 24 mmol/L      ANION GAP 8 mmol/L      BUN 22 mg/dL      Creatinine 0.87 mg/dL      Glucose 113 mg/dL      Calcium 9.1 mg/dL      eGFR 90 ml/min/1.73sq m     Narrative:      National Kidney Disease Foundation guidelines for Chronic Kidney Disease (CKD):     Stage 1 with normal or high GFR (GFR > 90 mL/min/1.73 square meters)    Stage 2 Mild CKD (GFR = 60-89 mL/min/1.73 square meters)    Stage 3A Moderate CKD (GFR = 45-59 mL/min/1.73 square meters)    Stage 3B Moderate CKD (GFR = 30-44 mL/min/1.73 square meters)    Stage 4 Severe CKD (GFR = 15-29 mL/min/1.73 square meters)    Stage 5 End Stage CKD (GFR <15  mL/min/1.73 square meters)  Note: GFR calculation is accurate only with a steady state creatinine    D-Dimer [[id number]]  (Normal) Collected: 01/23/25 1152    Lab Status: Final result Specimen: Blood from Arm, Right Updated: 01/23/25 1212     D-Dimer, Quant 0.37 ug/ml FEU             CTA chest pe study   Final Interpretation by Mi Frederick MD (01/23 1414)         1. No central pulmonary embolism.   2. Clear lungs.   3. Left breast mass, previously shown on ultrasound to represent a fibroadenoma.                  Workstation performed: [id number]             Procedures  ECG 12 Lead Documentation  Date/Time: today/date: 1/23/2025  Performed by: Coby Khan    ECG reviewed by me, the ED Provider: yes    Patient location:  ED   Previous ECG: No old for comparison  Rate: 124    ECG rate assessment: normal    Rhythm: sinus tachycardia    Ectopy:  none    QRS axis:  Normal  Intervals: normal   Q waves: None   ST segments:  Diffuse ST depressions  T waves: Diffuse T wave inversions     Impression: Sinus tachycardia with diffuse ST depressions and T wave inversions, suspect due to rate.  No old for comparison.      ED Medication and Procedure Management   Prior to Admission Medications   Prescriptions Last Dose Informant Patient Reported? Taking?   Prenatal Vit w/Un-Oqpnrhvkw-KI (PNV PO)  Self Yes No   Sig: Take 1 tablet by mouth in the morning   Patient not taking: Reported on 12/10/2024   amphetamine-dextroamphetamine (ADDERALL XR) 20 MG 24 hr capsule   No No   Sig: Take 1 capsule (20 mg total) by mouth every morning Max Daily Amount: 20 mg   enoxaparin (LOVENOX) 40 mg/0.4 mL   No No   Sig: Inject 0.4 mL (40 mg total) under the skin daily at bedtime      Facility-Administered Medications: None     Current Discharge Medication List        CONTINUE these medications which have NOT CHANGED    Details   amphetamine-dextroamphetamine (ADDERALL XR) 20 MG 24 hr capsule Take 1 capsule (20 mg total) by mouth every  morning Max Daily Amount: 20 mg  Qty: 30 capsule, Refills: 0    Associated Diagnoses: Attention deficit hyperactivity disorder (ADHD), unspecified ADHD type      enoxaparin (LOVENOX) 40 mg/0.4 mL Inject 0.4 mL (40 mg total) under the skin daily at bedtime  Qty: 16 mL, Refills: 0    Associated Diagnoses: Status post primary low transverse  section      Prenatal Vit w/Kl-Cihdfxxzk-EH (PNV PO) Take 1 tablet by mouth in the morning           No discharge procedures on file.  ED SEPSIS DOCUMENTATION   Time reflects when diagnosis was documented in both MDM as applicable and the Disposition within this note       Time User Action Codes Description Comment    2025  2:43 PM Coby Khan Add [R00.2] Palpitations     2025  2:43 PM Coby Khan Add [R42] Lightheadedness                  Coby Khan DO  [phone]

## 2025-01-23 NOTE — Clinical Note
Maxwell Crabtree was seen and treated in our emergency department on 1/23/2025.                Diagnosis:     Maxwell  may return to work on return date.    She may return on this date: 01/24/2025         If you have any questions or concerns, please don't hesitate to call.      Coby Khan, DO    ______________________________           _______________          _______________  Hospital Representative                              Date                                Time
Yes

## 2025-01-24 ENCOUNTER — VBI (OUTPATIENT)
Dept: FAMILY MEDICINE CLINIC | Facility: CLINIC | Age: 30
End: 2025-01-24

## 2025-01-24 NOTE — TELEPHONE ENCOUNTER
01/24/25 12:41 PM    Patient contacted post ED visit, [facility] department spoke with patient/caregiver and outreach was successful.    Thank you.  Marilou James  [facility]

## 2025-02-28 DIAGNOSIS — F90.9 ATTENTION DEFICIT HYPERACTIVITY DISORDER (ADHD), UNSPECIFIED ADHD TYPE: ICD-10-CM

## 2025-03-03 RX ORDER — DEXTROAMPHETAMINE SACCHARATE, AMPHETAMINE ASPARTATE MONOHYDRATE, DEXTROAMPHETAMINE SULFATE AND AMPHETAMINE SULFATE 5; 5; 5; 5 MG/1; MG/1; MG/1; MG/1
20 CAPSULE, EXTENDED RELEASE ORAL EVERY MORNING
Qty: 30 CAPSULE | Refills: 0 | Status: SHIPPED | OUTPATIENT
Start: 2025-03-03

## 2025-04-09 DIAGNOSIS — F90.9 ATTENTION DEFICIT HYPERACTIVITY DISORDER (ADHD), UNSPECIFIED ADHD TYPE: ICD-10-CM

## 2025-04-09 RX ORDER — DEXTROAMPHETAMINE SACCHARATE, AMPHETAMINE ASPARTATE MONOHYDRATE, DEXTROAMPHETAMINE SULFATE AND AMPHETAMINE SULFATE 5; 5; 5; 5 MG/1; MG/1; MG/1; MG/1
20 CAPSULE, EXTENDED RELEASE ORAL EVERY MORNING
Qty: 30 CAPSULE | Refills: 0 | Status: SHIPPED | OUTPATIENT
Start: 2025-04-09

## 2025-05-19 DIAGNOSIS — F90.9 ATTENTION DEFICIT HYPERACTIVITY DISORDER (ADHD), UNSPECIFIED ADHD TYPE: ICD-10-CM

## 2025-05-19 RX ORDER — DEXTROAMPHETAMINE SACCHARATE, AMPHETAMINE ASPARTATE MONOHYDRATE, DEXTROAMPHETAMINE SULFATE AND AMPHETAMINE SULFATE 5; 5; 5; 5 MG/1; MG/1; MG/1; MG/1
20 CAPSULE, EXTENDED RELEASE ORAL EVERY MORNING
Qty: 30 CAPSULE | Refills: 0 | Status: SHIPPED | OUTPATIENT
Start: 2025-05-19

## 2025-05-19 NOTE — TELEPHONE ENCOUNTER
Reason for call:   [x] Refill   [] Prior Auth  [] Other:     Office: [facility]   [x] PCP/Provider - Ye Allan   [] Specialty/Provider -     Medication: adderall     Dose/Frequency: 20 mg XR/ daily     Quantity: 30 day supply     Pharmacy: [facility] in [place]     Local Pharmacy   Does the patient have enough for 3 days?   [] Yes   [x] No - Send as HP to POD- out completely.     Mail Away Pharmacy   Does the patient have enough for 10 days?   [] Yes   [] No - Send as HP to POD

## 2025-06-21 DIAGNOSIS — F90.9 ATTENTION DEFICIT HYPERACTIVITY DISORDER (ADHD), UNSPECIFIED ADHD TYPE: ICD-10-CM

## 2025-06-23 ENCOUNTER — OFFICE VISIT (OUTPATIENT)
Dept: PODIATRY | Facility: CLINIC | Age: 30
End: 2025-06-23
Payer: COMMERCIAL

## 2025-06-23 VITALS — WEIGHT: 170.8 LBS | BODY MASS INDEX: 27.45 KG/M2 | HEIGHT: 66 IN

## 2025-06-23 DIAGNOSIS — B07.0 PLANTAR WARTS: Primary | ICD-10-CM

## 2025-06-23 PROCEDURE — 17110 DESTRUCTION B9 LES UP TO 14: CPT | Performed by: PODIATRIST

## 2025-06-23 RX ORDER — DEXTROAMPHETAMINE SACCHARATE, AMPHETAMINE ASPARTATE MONOHYDRATE, DEXTROAMPHETAMINE SULFATE AND AMPHETAMINE SULFATE 5; 5; 5; 5 MG/1; MG/1; MG/1; MG/1
20 CAPSULE, EXTENDED RELEASE ORAL EVERY MORNING
Qty: 30 CAPSULE | Refills: 0 | Status: SHIPPED | OUTPATIENT
Start: 2025-06-23

## 2025-06-23 NOTE — PROGRESS NOTES
"Name: Maxwell Crabtree      : 1995      MRN: [id number]  Encounter Provider: Octavio Benz DPM  Encounter Date: 2025   Encounter department: [facility]  :  Assessment & Plan  Plantar warts  Cantharone application completed today.    Return for reevaluation in 3 weeks.  Orders:  •  Lesion Destruction    Lesion Destruction    Date/Time: 2025 8:15 AM    Performed by: Octavio Benz DPM  Authorized by: Octavio Benz DPM    Universal Protocol:  procedure performed by consultantConsent: Verbal consent obtained  Risks and benefits: risks, benefits and alternatives were discussed  Consent given by: patient  Time out: Immediately prior to procedure a \"time out\" was called to verify the correct patient, procedure, equipment, support staff and site/side marked as required.  Patient understanding: patient states understanding of the procedure being performed  Patient identity confirmed: verbally with patient    Procedure Details - Lesion Destruction:     Number of Lesions:  3  Lesion 1:     Body area:  Lower extremity    Malignancy: benign lesion      Destruction method: chemical removal       Hyperkeratotic tissue trimmed down to pinpoint bleeding with #15 blade.  Cantharone Plus was applied to the lesion(s) as above with band-aid.  Discussed with patient \"off label\" use of cantharone for treatment of verrucae.  Discussed risks/benefits of the medication.  Answered questions to patients satisfaction.   Patient was instructed to keep this occlusive dressing intact for 8 hours and then changing the dressing keeping the area covered  Return to clinic in about 2-3 weeks for reevaluation of lesion and possible retreatment.  Notify office if extreme pain or notices any signs of infection such as increased swelling, redness, drainage etc.          History of Present Illness   HPI  Maxwell Crabtree is a 29 y.o. female who presents follow-up for plantar wart " "treatment.  She notices 3 separate areas on her right foot she does have some pain to the central portion.  She is now finished with breast-feeding and wants to pursue treatment.      Review of Systems       Objective   Ht 5' 6\" (1.676 m)   Wt 77.5 kg (170 lb 12.8 oz)   Breastfeeding No   BMI 27.57 kg/m²      Physical Exam    Vascular: Intact pedal pulses bilateral DP and PT.  Neurological: Gross protective sensation intact bilateral  Musculoskeletal: Muscle strength bilateral intact with dorsiflexion, inversion, eversion and plantarflexion.  Dermatological: Plantar verruca with pinpoint bleeding noted on trimming today at right foot 3 separate lesions noted.        "

## 2025-06-23 NOTE — TELEPHONE ENCOUNTER
I called and left a message for patient to make sure to keep her appt for July if not Dr Allan would sent anymore refills.

## 2025-07-07 ENCOUNTER — OFFICE VISIT (OUTPATIENT)
Dept: FAMILY MEDICINE CLINIC | Facility: CLINIC | Age: 30
End: 2025-07-07
Payer: COMMERCIAL

## 2025-07-07 VITALS
BODY MASS INDEX: 27.16 KG/M2 | WEIGHT: 169 LBS | TEMPERATURE: 97.4 F | OXYGEN SATURATION: 98 % | HEIGHT: 66 IN | HEART RATE: 76 BPM | DIASTOLIC BLOOD PRESSURE: 78 MMHG | SYSTOLIC BLOOD PRESSURE: 110 MMHG

## 2025-07-07 DIAGNOSIS — Z00.00 HEALTH MAINTENANCE EXAMINATION: Primary | ICD-10-CM

## 2025-07-07 DIAGNOSIS — F90.0 ATTENTION DEFICIT HYPERACTIVITY DISORDER (ADHD), PREDOMINANTLY INATTENTIVE TYPE: ICD-10-CM

## 2025-07-07 DIAGNOSIS — D68.52 PROTHROMBIN GENE MUTATION (HCC): ICD-10-CM

## 2025-07-07 PROBLEM — Z98.891 STATUS POST PRIMARY LOW TRANSVERSE CESAREAN SECTION: Status: RESOLVED | Noted: 2024-01-17 | Resolved: 2025-07-07

## 2025-07-07 PROCEDURE — 99395 PREV VISIT EST AGE 18-39: CPT | Performed by: FAMILY MEDICINE

## 2025-07-07 RX ORDER — DEXTROAMPHETAMINE SACCHARATE, AMPHETAMINE ASPARTATE MONOHYDRATE, DEXTROAMPHETAMINE SULFATE AND AMPHETAMINE SULFATE 6.25; 6.25; 6.25; 6.25 MG/1; MG/1; MG/1; MG/1
25 CAPSULE, EXTENDED RELEASE ORAL EVERY MORNING
Qty: 30 CAPSULE | Refills: 0 | Status: SHIPPED | OUTPATIENT
Start: 2025-07-22

## 2025-07-07 NOTE — PATIENT INSTRUCTIONS
"Health maintenance is performed.  Excellent health.  Increase Adderall to 25 mg with the next fill.  Call as needed for refills.  Recheck in 6 months.  Patient Education     Routine physical for adults   The Basics   Written by the doctors and editors at [facility]   What is a physical? -- A physical is a routine visit, or \"check-up,\" with your doctor. You might also hear it called a \"wellness visit\" or \"preventive visit.\"  During each visit, the doctor will:   Ask about your physical and mental health   Ask about your habits, behaviors, and lifestyle   Do an exam   Give you vaccines if needed   Talk to you about any medicines you take   Give advice about your health   Answer your questions  Getting regular check-ups is an important part of taking care of your health. It can help your doctor find and treat any problems you have. But it's also important for preventing health problems.  A routine physical is different from a \"sick visit.\" A sick visit is when you see a doctor because of a health concern or problem. Since physicals are scheduled ahead of time, you can think about what you want to ask the doctor.  How often should I get a physical? -- It depends on your age and health. In general, for people age 21 years and older:   If you are younger than 50 years, you might be able to get a physical every 3 years.   If you are 50 years or older, your doctor might recommend a physical every year.  If you have an ongoing health condition, like diabetes or high blood pressure, your doctor will probably want to see you more often.  What happens during a physical? -- In general, each visit will include:   Physical exam - The doctor or nurse will check your height, weight, heart rate, and blood pressure. They will also look at your eyes and ears. They will ask about how you are feeling and whether you have any symptoms that bother you.   Medicines - It's a good idea to bring a list of all the medicines you take to each " "doctor visit. Your doctor will talk to you about your medicines and answer any questions. Tell them if you are having any side effects that bother you. You should also tell them if you are having trouble paying for any of your medicines.   Habits and behaviors - This includes:   Your diet   Your exercise habits   Whether you smoke, drink alcohol, or use drugs   Whether you are sexually active   Whether you feel safe at home  Your doctor will talk to you about things you can do to improve your health and lower your risk of health problems. They will also offer help and support. For example, if you want to quit smoking, they can give you advice and might prescribe medicines. If you want to improve your diet or get more physical activity, they can help you with this, too.   Lab tests, if needed - The tests you get will depend on your age and situation. For example, your doctor might want to check your:   Cholesterol   Blood sugar   Iron level   Vaccines - The recommended vaccines will depend on your age, health, and what vaccines you already had. Vaccines are very important because they can prevent certain serious or deadly infections.   Discussion of screening - \"Screening\" means checking for diseases or other health problems before they cause symptoms. Your doctor can recommend screening based on your age, risk, and preferences. This might include tests to check for:   Cancer, such as breast, prostate, cervical, ovarian, colorectal, prostate, lung, or skin cancer   Sexually transmitted infections, such as chlamydia and gonorrhea   Mental health conditions like depression and anxiety  Your doctor will talk to you about the different types of screening tests. They can help you decide which screenings to have. They can also explain what the results might mean.   Answering questions - The physical is a good time to ask the doctor or nurse questions about your health. If needed, they can refer you to other doctors or " specialists, too.  Adults older than 65 years often need other care, too. As you get older, your doctor will talk to you about:   How to prevent falling at home   Hearing or vision tests   Memory testing   How to take your medicines safely   Making sure that you have the help and support you need at home  All topics are updated as new evidence becomes available and our peer review process is complete.  This topic retrieved from [employer] on: May 02, 2024.  Topic 438954 Version 1.0  Release: 32.4.3 - C32.122  © 2024 UpToDate, Inc. and/or its affiliates. All rights reserved.  Consumer Information Use and Disclaimer   Disclaimer: This generalized information is a limited summary of diagnosis, treatment, and/or medication information. It is not meant to be comprehensive and should be used as a tool to help the user understand and/or assess potential diagnostic and treatment options. It does NOT include all information about conditions, treatments, medications, side effects, or risks that may apply to a specific patient. It is not intended to be medical advice or a substitute for the medical advice, diagnosis, or treatment of a health care provider based on the health care provider's examination and assessment of a patient's specific and unique circumstances. Patients must speak with a health care provider for complete information about their health, medical questions, and treatment options, including any risks or benefits regarding use of medications. This information does not endorse any treatments or medications as safe, effective, or approved for treating a specific patient. UpToDate, Inc. and its affiliates disclaim any warranty or liability relating to this information or the use thereof.The use of this information is governed by the Terms of Use, available at https://www.wolterskluwer.com/en/know/clinical-effectiveness-terms. 2024© UpToDate, Inc. and its affiliates and/or licensors. All rights reserved.  Copyright    © 2024 [employer] and/or its affiliates. All rights reserved.

## 2025-07-07 NOTE — PROGRESS NOTES
"Name: Maxwell Crabtree      : 1995      MRN: [id number]  Encounter Provider: Ye Allan MD  Encounter Date: 2025   Encounter department: [facility]    Assessment & Plan  Health maintenance examination         Attention deficit hyperactivity disorder (ADHD), predominantly inattentive type    Orders:  •  amphetamine-dextroamphetamine (ADDERALL XR, 25MG,) 25 MG 24 hr capsule; Take 1 capsule (25 mg total) by mouth every morning Max Daily Amount: 25 mg Do not start before 2025.    Prothrombin gene mutation (HCC)            Patient Instructions   Health maintenance is performed.  Excellent health.  Increase Adderall to 25 mg with the next fill.  Call as needed for refills.  Recheck in 6 months.  Patient Education     Routine physical for adults   The Basics   Written by the doctors and editors at [facility]   What is a physical? -- A physical is a routine visit, or \"check-up,\" with your doctor. You might also hear it called a \"wellness visit\" or \"preventive visit.\"  During each visit, the doctor will:   Ask about your physical and mental health   Ask about your habits, behaviors, and lifestyle   Do an exam   Give you vaccines if needed   Talk to you about any medicines you take   Give advice about your health   Answer your questions  Getting regular check-ups is an important part of taking care of your health. It can help your doctor find and treat any problems you have. But it's also important for preventing health problems.  A routine physical is different from a \"sick visit.\" A sick visit is when you see a doctor because of a health concern or problem. Since physicals are scheduled ahead of time, you can think about what you want to ask the doctor.  How often should I get a physical? -- It depends on your age and health. In general, for people age 21 years and older:   If you are younger than 50 years, you might be able to get a physical every 3 years.   If you are 50 years or " "older, your doctor might recommend a physical every year.  If you have an ongoing health condition, like diabetes or high blood pressure, your doctor will probably want to see you more often.  What happens during a physical? -- In general, each visit will include:   Physical exam - The doctor or nurse will check your height, weight, heart rate, and blood pressure. They will also look at your eyes and ears. They will ask about how you are feeling and whether you have any symptoms that bother you.   Medicines - It's a good idea to bring a list of all the medicines you take to each doctor visit. Your doctor will talk to you about your medicines and answer any questions. Tell them if you are having any side effects that bother you. You should also tell them if you are having trouble paying for any of your medicines.   Habits and behaviors - This includes:   Your diet   Your exercise habits   Whether you smoke, drink alcohol, or use drugs   Whether you are sexually active   Whether you feel safe at home  Your doctor will talk to you about things you can do to improve your health and lower your risk of health problems. They will also offer help and support. For example, if you want to quit smoking, they can give you advice and might prescribe medicines. If you want to improve your diet or get more physical activity, they can help you with this, too.   Lab tests, if needed - The tests you get will depend on your age and situation. For example, your doctor might want to check your:   Cholesterol   Blood sugar   Iron level   Vaccines - The recommended vaccines will depend on your age, health, and what vaccines you already had. Vaccines are very important because they can prevent certain serious or deadly infections.   Discussion of screening - \"Screening\" means checking for diseases or other health problems before they cause symptoms. Your doctor can recommend screening based on your age, risk, and preferences. This might " include tests to check for:   Cancer, such as breast, prostate, cervical, ovarian, colorectal, prostate, lung, or skin cancer   Sexually transmitted infections, such as chlamydia and gonorrhea   Mental health conditions like depression and anxiety  Your doctor will talk to you about the different types of screening tests. They can help you decide which screenings to have. They can also explain what the results might mean.   Answering questions - The physical is a good time to ask the doctor or nurse questions about your health. If needed, they can refer you to other doctors or specialists, too.  Adults older than 65 years often need other care, too. As you get older, your doctor will talk to you about:   How to prevent falling at home   Hearing or vision tests   Memory testing   How to take your medicines safely   Making sure that you have the help and support you need at home  All topics are updated as new evidence becomes available and our peer review process is complete.  This topic retrieved from [employer] on: May 02, 2024.  Topic 554337 Version 1.0  Release: 32.4.3 - C32.122  © 2024 UpToDate, Inc. and/or its affiliates. All rights reserved.  Consumer Information Use and Disclaimer   Disclaimer: This generalized information is a limited summary of diagnosis, treatment, and/or medication information. It is not meant to be comprehensive and should be used as a tool to help the user understand and/or assess potential diagnostic and treatment options. It does NOT include all information about conditions, treatments, medications, side effects, or risks that may apply to a specific patient. It is not intended to be medical advice or a substitute for the medical advice, diagnosis, or treatment of a health care provider based on the health care provider's examination and assessment of a patient's specific and unique circumstances. Patients must speak with a health care provider for complete information about their  health, medical questions, and treatment options, including any risks or benefits regarding use of medications. This information does not endorse any treatments or medications as safe, effective, or approved for treating a specific patient. UpToDate, Inc. and its affiliates disclaim any warranty or liability relating to this information or the use thereof.The use of this information is governed by the Terms of Use, available at https://www.woltersBaseTraceuwer.com/en/know/clinical-effectiveness-terms. 2024© UpToDate, Inc. and its affiliates and/or licensors. All rights reserved.  Copyright   © 2024 UpToDate, Inc. and/or its affiliates. All rights reserved.         History of Present Illness     HPI  Here for annual physical exam and follow-up of ADHD.  Restarted Adderall 20 mg daily but feels that she did better on 25.  Working 1 day a week and enjoying motherhood.      Review of Systems    Past Medical History[1]  Past Surgical History[2]  Social History     Social History Daren    Works as a nurse at [employer].      Went to [facility].  After pre-requisites at [employer].     to Dangelo since 8/20.  Back yard wedding.      Came from [place] when she was 7.     Dangelo works for the [employer] [place].      Enjoys volleyball.     Medications[3]  Allergies   Allergen Reactions   • Shellfish-Derived Products - Food Allergy Swelling     Immunization History   Administered Date(s) Administered   • BCG 1995   • COVID-19 MODERNA VACC 0.5 ML IM 12/24/2020, 01/19/2021   • DTaP 02/19/1997, 05/07/1997, 07/20/1997   • H1N1, All Formulations 01/28/2010   • Hep B, Adolescent or Pediatric 12/29/2003, 01/29/2004, 08/04/2004   • INFLUENZA 01/28/2010, 09/11/2017, 08/25/2018, 10/14/2020, 10/05/2021, 10/04/2022   • Influenza, injectable, quadrivalent, preservative free 0.5 mL 08/25/2018, 09/19/2019   • Influenza, seasonal, injectable 09/11/2017, 08/25/2018   • Influenza, seasonal, injectable, preservative free  "2024   • MMR 2004   • Measles 07/10/1997, 2003   • OPV 1996, 1997   • Td (adult), adsorbed 2003   • Tdap 2017, 2019, 2023   • Varicella 2003     Objective   /78 (BP Location: Left arm, Patient Position: Sitting, Cuff Size: Standard)   Pulse 76   Temp (!) 97.4 °F (36.3 °C) (Temporal)   Ht 5' 6\" (1.676 m)   Wt 76.7 kg (169 lb)   SpO2 98%   BMI 27.28 kg/m²     Physical Exam  Healthy appearing individual in no acute distress.  Extraocular motions are intact.  Both ear drums are white.  Hearing is grossly intact.  Throat reveals no erythema.  Teeth are in good repair.  No neck nodes or thyromegaly.  Lungs are clear.  Heart regular with no murmurs or gallops.  Abdomen is soft and nontender.  No leg edema.  Skin reveals no apparent rash.  Neurologic grossly within normal limits.  Normal mood and affect.  Musculoskeletal exam grossly within normal limits.             [1]  Past Medical History:  Diagnosis Date   • ADHD    • Asthma    • Fibroadenoma of breast, left, right    • Prothrombin gene mutation (HCC)    [2]  Past Surgical History:  Procedure Laterality Date   • ND  DELIVERY ONLY N/A 2024    Procedure:  SECTION ();  Surgeon: Jacqui Banegas MD;  Location: [facility];  Service: Obstetrics   • WISDOM TOOTH EXTRACTION     • WISDOM TOOTH EXTRACTION     [3]  Current Outpatient Medications on File Prior to Visit   Medication Sig   • [DISCONTINUED] amphetamine-dextroamphetamine (ADDERALL XR) 20 MG 24 hr capsule Take 1 capsule (20 mg total) by mouth every morning Max Daily Amount: 20 mg   • [DISCONTINUED] enoxaparin (LOVENOX) 40 mg/0.4 mL Inject 0.4 mL (40 mg total) under the skin daily at bedtime   • [DISCONTINUED] Prenatal Vit w/Pf-Yocwdhwdq-OC (PNV PO) Take 1 tablet by mouth in the morning (Patient not taking: Reported on 12/10/2024)   "

## 2025-07-07 NOTE — ASSESSMENT & PLAN NOTE
Orders:  •  amphetamine-dextroamphetamine (ADDERALL XR, 25MG,) 25 MG 24 hr capsule; Take 1 capsule (25 mg total) by mouth every morning Max Daily Amount: 25 mg Do not start before July 22, 2025.

## 2025-07-14 ENCOUNTER — OFFICE VISIT (OUTPATIENT)
Dept: PODIATRY | Facility: CLINIC | Age: 30
End: 2025-07-14
Payer: COMMERCIAL

## 2025-07-14 VITALS — BODY MASS INDEX: 27.16 KG/M2 | WEIGHT: 169 LBS | HEIGHT: 66 IN

## 2025-07-14 DIAGNOSIS — B07.0 PLANTAR WARTS: Primary | ICD-10-CM

## 2025-07-14 PROCEDURE — 17110 DESTRUCTION B9 LES UP TO 14: CPT | Performed by: PODIATRIST

## 2025-07-14 NOTE — PROGRESS NOTES
"Name: Maxwell Crabtree      : 1995      MRN: [id number]  Encounter Provider: Octavio Benz DPM  Encounter Date: 2025   Encounter department: [facility]  :  Assessment & Plan  Plantar warts  Plan for treatment of plantar warts again today.    She did have improvement from the last visit.      Orders:  •  Lesion Destruction    Lesion Destruction    Date/Time: 2025 8:45 AM    Performed by: Octavio Benz DPM  Authorized by: Octavio Benz DPM    Universal Protocol:  procedure performed by consultantConsent: Verbal consent obtained  Risks and benefits: risks, benefits and alternatives were discussed  Consent given by: patient  Time out: Immediately prior to procedure a \"time out\" was called to verify the correct patient, procedure, equipment, support staff and site/side marked as required.  Patient understanding: patient states understanding of the procedure being performed  Patient identity confirmed: verbally with patient    Procedure Details - Lesion Destruction:     Number of Lesions:  3  Lesion 1:     Body area:  Lower extremity    Lower extremity location:  R foot    Malignancy: benign lesion      Destruction method: chemical removal       Hyperkeratotic tissue trimmed down to pinpoint bleeding with #15 blade.  Cantharone was applied to the lesion(s) as above with band-aid.  Discussed with patient \"off label\" use of cantharone for treatment of verrucae.  Discussed risks/benefits of the medication.  Answered questions to patients satisfaction.   Patient was instructed to keep this occlusive dressing intact for 24 hours and then changing the dressing keeping the area covered  Return to clinic in about 2-3 weeks for reevaluation of lesion and possible retreatment.  Notify office if extreme pain or notices any signs of infection such as increased swelling, redness, drainage etc.          History of Present Illness   HPI  Maxwell Crabtree is a 29 " "y.o. female who presents for return evaluation of right foot wart treatment.  Patient has been doing well she did have some pain after the treatment last visit.  The areas do appear to be improved.      Review of Systems       Objective   Ht 5' 6\" (1.676 m)   Wt 76.7 kg (169 lb)   BMI 27.28 kg/m²      Physical Exam    Right foot plantar wart lesions noted that appear to be stable.  No signs of infection noted otherwise.  There is some pinpoint bleeding noted on examination of the warts today.  There is no significant signs of infection noted otherwise.  Intact pedal pulses.          "

## 2025-08-06 ENCOUNTER — OFFICE VISIT (OUTPATIENT)
Dept: PODIATRY | Facility: CLINIC | Age: 30
End: 2025-08-06
Payer: COMMERCIAL

## 2025-08-06 VITALS — HEIGHT: 66 IN | BODY MASS INDEX: 27.32 KG/M2 | WEIGHT: 170 LBS

## 2025-08-06 DIAGNOSIS — B07.0 PLANTAR WARTS: Primary | ICD-10-CM

## 2025-08-06 PROCEDURE — 17110 DESTRUCTION B9 LES UP TO 14: CPT | Performed by: PODIATRIST

## (undated) DEVICE — GLOVE SRG BIOGEL ECLIPSE 7

## (undated) DEVICE — PACK C-SECTION PBDS

## (undated) DEVICE — CHLORAPREP HI-LITE 26ML ORANGE

## (undated) DEVICE — ABG MICROSTICKS SAFETY

## (undated) DEVICE — SUT VICRYL 0 CTX 36 IN J978H

## (undated) DEVICE — SUT MONOCRYL 4-0 PS-2 27 IN Y426H

## (undated) DEVICE — SUT CHROMIC 0 CT-1 27 IN 812H

## (undated) DEVICE — GLOVE INDICATOR PI UNDERGLOVE SZ 7.5 BLUE

## (undated) DEVICE — SWABSTCK, BENZOIN TINCTURE, 1/PK, STRL: Brand: APLICARE

## (undated) DEVICE — 3M™ STERI-STRIP™ REINFORCED ADHESIVE SKIN CLOSURES, R1547, 1/2 IN X 4 IN (12 MM X 100 MM), 6 STRIPS/ENVELOPE: Brand: 3M™ STERI-STRIP™

## (undated) DEVICE — SUT VICRYL 0 CT-1 36 IN J946H